# Patient Record
Sex: FEMALE | Race: OTHER | NOT HISPANIC OR LATINO
[De-identification: names, ages, dates, MRNs, and addresses within clinical notes are randomized per-mention and may not be internally consistent; named-entity substitution may affect disease eponyms.]

---

## 2017-04-17 PROBLEM — Z00.00 ENCOUNTER FOR PREVENTIVE HEALTH EXAMINATION: Status: ACTIVE | Noted: 2017-04-17

## 2017-04-20 ENCOUNTER — APPOINTMENT (OUTPATIENT)
Dept: ORTHOPEDIC SURGERY | Facility: CLINIC | Age: 77
End: 2017-04-20

## 2017-04-20 VITALS
HEART RATE: 83 BPM | HEIGHT: 61 IN | BODY MASS INDEX: 24.55 KG/M2 | SYSTOLIC BLOOD PRESSURE: 119 MMHG | DIASTOLIC BLOOD PRESSURE: 74 MMHG | WEIGHT: 130 LBS

## 2017-04-20 DIAGNOSIS — M25.562 PAIN IN LEFT KNEE: ICD-10-CM

## 2017-04-21 PROBLEM — M25.562 LEFT KNEE PAIN: Status: ACTIVE | Noted: 2017-04-21

## 2017-05-02 RX ORDER — CELECOXIB 100 MG/1
100 CAPSULE ORAL
Qty: 30 | Refills: 1 | Status: ACTIVE | COMMUNITY
Start: 2017-05-02 | End: 1900-01-01

## 2017-05-25 ENCOUNTER — APPOINTMENT (OUTPATIENT)
Dept: ORTHOPEDIC SURGERY | Facility: CLINIC | Age: 77
End: 2017-05-25

## 2017-09-12 ENCOUNTER — APPOINTMENT (OUTPATIENT)
Dept: ORTHOPEDIC SURGERY | Facility: CLINIC | Age: 77
End: 2017-09-12
Payer: MEDICAID

## 2017-09-12 PROCEDURE — 99214 OFFICE O/P EST MOD 30 MIN: CPT

## 2017-09-12 RX ORDER — DICLOFENAC SODIUM 10 MG/G
1 GEL TOPICAL
Qty: 2 | Refills: 0 | Status: ACTIVE | COMMUNITY
Start: 2017-09-12 | End: 1900-01-01

## 2017-09-16 ENCOUNTER — EMERGENCY (EMERGENCY)
Facility: HOSPITAL | Age: 77
LOS: 1 days | Discharge: HOME | End: 2017-09-16
Attending: EMERGENCY MEDICINE | Admitting: EMERGENCY MEDICINE
Payer: MEDICAID

## 2017-09-16 VITALS
SYSTOLIC BLOOD PRESSURE: 140 MMHG | OXYGEN SATURATION: 100 % | HEART RATE: 81 BPM | RESPIRATION RATE: 16 BRPM | DIASTOLIC BLOOD PRESSURE: 75 MMHG | TEMPERATURE: 98 F | WEIGHT: 125 LBS

## 2017-09-16 DIAGNOSIS — M25.562 PAIN IN LEFT KNEE: ICD-10-CM

## 2017-09-16 DIAGNOSIS — Z79.84 LONG TERM (CURRENT) USE OF ORAL HYPOGLYCEMIC DRUGS: ICD-10-CM

## 2017-09-16 DIAGNOSIS — Z96.652 PRESENCE OF LEFT ARTIFICIAL KNEE JOINT: ICD-10-CM

## 2017-09-16 DIAGNOSIS — E11.9 TYPE 2 DIABETES MELLITUS WITHOUT COMPLICATIONS: ICD-10-CM

## 2017-09-16 LAB
ALBUMIN SERPL ELPH-MCNC: 3.7 G/DL — SIGNIFICANT CHANGE UP (ref 3.3–5)
ALP SERPL-CCNC: 66 U/L — SIGNIFICANT CHANGE UP (ref 40–120)
ALT FLD-CCNC: 19 U/L — SIGNIFICANT CHANGE UP (ref 12–78)
ANION GAP SERPL CALC-SCNC: 11 MMOL/L — SIGNIFICANT CHANGE UP (ref 5–17)
APTT BLD: 33.6 SEC — SIGNIFICANT CHANGE UP (ref 27.5–37.4)
AST SERPL-CCNC: 26 U/L — SIGNIFICANT CHANGE UP (ref 15–37)
BASOPHILS # BLD AUTO: 0 K/UL — SIGNIFICANT CHANGE UP (ref 0–0.2)
BASOPHILS NFR BLD AUTO: 0.4 % — SIGNIFICANT CHANGE UP (ref 0–2)
BILIRUB SERPL-MCNC: 0.4 MG/DL — SIGNIFICANT CHANGE UP (ref 0.2–1.2)
BUN SERPL-MCNC: 21 MG/DL — SIGNIFICANT CHANGE UP (ref 7–23)
CALCIUM SERPL-MCNC: 8.7 MG/DL — SIGNIFICANT CHANGE UP (ref 8.5–10.1)
CHLORIDE SERPL-SCNC: 105 MMOL/L — SIGNIFICANT CHANGE UP (ref 96–108)
CO2 SERPL-SCNC: 23 MMOL/L — SIGNIFICANT CHANGE UP (ref 22–31)
CREAT SERPL-MCNC: 0.83 MG/DL — SIGNIFICANT CHANGE UP (ref 0.5–1.3)
EOSINOPHIL # BLD AUTO: 0 K/UL — SIGNIFICANT CHANGE UP (ref 0–0.5)
EOSINOPHIL NFR BLD AUTO: 0.3 % — SIGNIFICANT CHANGE UP (ref 0–6)
ERYTHROCYTE [SEDIMENTATION RATE] IN BLOOD: 21 MM/HR — HIGH (ref 0–20)
GLUCOSE SERPL-MCNC: 151 MG/DL — HIGH (ref 70–99)
HCT VFR BLD CALC: 29.5 % — LOW (ref 34.5–45)
HGB BLD-MCNC: 10 G/DL — LOW (ref 11.5–15.5)
INR BLD: 0.99 RATIO — SIGNIFICANT CHANGE UP (ref 0.88–1.16)
LACTATE SERPL-SCNC: 1.7 MMOL/L — SIGNIFICANT CHANGE UP (ref 0.7–2)
LYMPHOCYTES # BLD AUTO: 1.9 K/UL — SIGNIFICANT CHANGE UP (ref 1–3.3)
LYMPHOCYTES # BLD AUTO: 27.9 % — SIGNIFICANT CHANGE UP (ref 13–44)
MCHC RBC-ENTMCNC: 29.8 PG — SIGNIFICANT CHANGE UP (ref 27–34)
MCHC RBC-ENTMCNC: 33.8 GM/DL — SIGNIFICANT CHANGE UP (ref 32–36)
MCV RBC AUTO: 88.2 FL — SIGNIFICANT CHANGE UP (ref 80–100)
MONOCYTES # BLD AUTO: 0.5 K/UL — SIGNIFICANT CHANGE UP (ref 0–0.9)
MONOCYTES NFR BLD AUTO: 7.6 % — SIGNIFICANT CHANGE UP (ref 1–9)
NEUTROPHILS # BLD AUTO: 4.4 K/UL — SIGNIFICANT CHANGE UP (ref 1.8–7.4)
NEUTROPHILS NFR BLD AUTO: 63.8 % — SIGNIFICANT CHANGE UP (ref 43–77)
PLATELET # BLD AUTO: 210 K/UL — SIGNIFICANT CHANGE UP (ref 150–400)
POTASSIUM SERPL-MCNC: 5.1 MMOL/L — SIGNIFICANT CHANGE UP (ref 3.5–5.3)
POTASSIUM SERPL-SCNC: 5.1 MMOL/L — SIGNIFICANT CHANGE UP (ref 3.5–5.3)
PROT SERPL-MCNC: 7.2 G/DL — SIGNIFICANT CHANGE UP (ref 6–8.3)
PROTHROM AB SERPL-ACNC: 10.8 SEC — SIGNIFICANT CHANGE UP (ref 9.8–12.7)
RBC # BLD: 3.34 M/UL — LOW (ref 3.8–5.2)
RBC # FLD: 12.1 % — SIGNIFICANT CHANGE UP (ref 10.3–14.5)
SODIUM SERPL-SCNC: 139 MMOL/L — SIGNIFICANT CHANGE UP (ref 135–145)
WBC # BLD: 6.9 K/UL — SIGNIFICANT CHANGE UP (ref 3.8–10.5)
WBC # FLD AUTO: 6.9 K/UL — SIGNIFICANT CHANGE UP (ref 3.8–10.5)

## 2017-09-16 PROCEDURE — 73562 X-RAY EXAM OF KNEE 3: CPT

## 2017-09-16 PROCEDURE — 99284 EMERGENCY DEPT VISIT MOD MDM: CPT | Mod: 25

## 2017-09-16 PROCEDURE — 85730 THROMBOPLASTIN TIME PARTIAL: CPT

## 2017-09-16 PROCEDURE — 86140 C-REACTIVE PROTEIN: CPT

## 2017-09-16 PROCEDURE — 87040 BLOOD CULTURE FOR BACTERIA: CPT

## 2017-09-16 PROCEDURE — 80053 COMPREHEN METABOLIC PANEL: CPT

## 2017-09-16 PROCEDURE — 73562 X-RAY EXAM OF KNEE 3: CPT | Mod: 26,LT

## 2017-09-16 PROCEDURE — 83605 ASSAY OF LACTIC ACID: CPT

## 2017-09-16 PROCEDURE — 99284 EMERGENCY DEPT VISIT MOD MDM: CPT

## 2017-09-16 PROCEDURE — 85652 RBC SED RATE AUTOMATED: CPT

## 2017-09-16 PROCEDURE — 85027 COMPLETE CBC AUTOMATED: CPT

## 2017-09-16 PROCEDURE — 85610 PROTHROMBIN TIME: CPT

## 2017-09-16 RX ORDER — OXYCODONE AND ACETAMINOPHEN 5; 325 MG/1; MG/1
1 TABLET ORAL ONCE
Qty: 0 | Refills: 0 | Status: DISCONTINUED | OUTPATIENT
Start: 2017-09-16 | End: 2017-09-16

## 2017-09-16 RX ORDER — SODIUM CHLORIDE 9 MG/ML
1000 INJECTION INTRAMUSCULAR; INTRAVENOUS; SUBCUTANEOUS
Qty: 0 | Refills: 0 | Status: DISCONTINUED | OUTPATIENT
Start: 2017-09-16 | End: 2017-09-20

## 2017-09-16 RX ADMIN — OXYCODONE AND ACETAMINOPHEN 1 TABLET(S): 5; 325 TABLET ORAL at 16:13

## 2017-09-16 RX ADMIN — SODIUM CHLORIDE 200 MILLILITER(S): 9 INJECTION INTRAMUSCULAR; INTRAVENOUS; SUBCUTANEOUS at 20:52

## 2017-09-16 RX ADMIN — OXYCODONE AND ACETAMINOPHEN 1 TABLET(S): 5; 325 TABLET ORAL at 18:28

## 2017-09-16 NOTE — CONSULT NOTE ADULT - SUBJECTIVE AND OBJECTIVE BOX
77y Female community ambulatory presents c/o L knee pain for the last 4 days. History obtained through Pt's daughter as Pt does not speak English. Denies any recent trauma. Denies numbness/tingling. Admits to subjective fever/chills at home. Denies pain/injury elsewhere. Pt had a L TKA done at [facility] approx. 10 years ago, cannot remember the surgeon's name.     PAST MEDICAL & SURGICAL HISTORY:  DM (diabetes mellitus)      MEDICATIONS  (STANDING):    Allergies    No Known Allergies    Intolerances                Vital Signs Last 24 Hrs  T(C): 36.5 (09-16-17 @ 15:36), Max: 36.5 (09-16-17 @ 15:36)  T(F): 97.7 (09-16-17 @ 15:36), Max: 97.7 (09-16-17 @ 15:36)  HR: 81 (09-16-17 @ 15:36) (81 - 81)  BP: 140/75 (09-16-17 @ 15:36) (140/75 - 140/75)  BP(mean): --  RR: 16 (09-16-17 @ 15:36) (16 - 16)  SpO2: 100% (09-16-17 @ 15:36) (100% - 100%)    Imaging: XR demonstates R/L Knee OA    Physical Exam  Gen: NAD  LeftLE: Skin intact, Healed, well appearing midline incision over L knee, No erythema/warmth. AROM limited to: 0-75. Positive Effusion. Unable to SLR 2/2 pain, Neg log roll, Negative Heel strike, no ttp elsewhere, +EHL/FHL/TA/GS function, no calf TTP, DP/PT pulses intact, compartments soft. Calf soft, nontender. Ligamentous exam benign: Varus/Valgus/Lochman Negative.\    Secondary Survey: No TTP over bony prominences, SILT, palpable pulses, full/painless range of motion, compartments soft 77y Female community ambulatory presents c/o L knee pain for the last 4 days. History obtained through Pt's daughter as Pt does not speak English. Denies any recent trauma. Denies numbness/tingling. Admits to subjective fever/chills at home. Denies pain/injury elsewhere. Pt had a L TKA done at [facility] approx. 10 years ago, cannot remember the surgeon's name.     PAST MEDICAL & SURGICAL HISTORY:  DM (diabetes mellitus)      MEDICATIONS  (STANDING):    Allergies    No Known Allergies    Intolerances                Vital Signs Last 24 Hrs  T(C): 36.5 (09-16-17 @ 15:36), Max: 36.5 (09-16-17 @ 15:36)  T(F): 97.7 (09-16-17 @ 15:36), Max: 97.7 (09-16-17 @ 15:36)  HR: 81 (09-16-17 @ 15:36) (81 - 81)  BP: 140/75 (09-16-17 @ 15:36) (140/75 - 140/75)  BP(mean): --  RR: 16 (09-16-17 @ 15:36) (16 - 16)  SpO2: 100% (09-16-17 @ 15:36) (100% - 100%)      ESR: 21  CRP: Pending    Imaging: XR L knee demonstrates large knee effusion, no fracture, prior TKA prosthesis in situ    Physical Exam  Gen: NAD  Left LE: Skin intact, Healed, well appearing midline incision over L knee, No erythema/warmth. AROM limited to: 0-75 with pain at end flexion, Positive Effusion. Unable to SLR 2/2 pain, Neg log roll, Negative Heel strike, no ttp elsewhere, +EHL/FHL/TA/GS function, no calf TTP, DP/PT pulses intact, compartments soft. Calf soft, nontender. Ligamentous exam benign: Varus/Valgus/Lochman Negative.    Secondary Survey: No TTP over bony prominences, SILT, palpable pulses, full/painless range of motion, compartments soft 77y Female community ambulatory presents c/o L knee pain for the last 4 days. History obtained through Pt's daughter as Pt does not speak English. Denies any recent trauma. Denies numbness/tingling. Admits to subjective fever/chills at home. Denies pain/injury elsewhere. Pt had a L TKA done at [facility] approx. 10 years ago, cannot remember the surgeon's name.     PAST MEDICAL & SURGICAL HISTORY:  DM (diabetes mellitus)      MEDICATIONS  (STANDING):    Allergies    No Known Allergies    Intolerances                Vital Signs Last 24 Hrs  T(C): 36.5 (09-16-17 @ 15:36), Max: 36.5 (09-16-17 @ 15:36)  T(F): 97.7 (09-16-17 @ 15:36), Max: 97.7 (09-16-17 @ 15:36)  HR: 81 (09-16-17 @ 15:36) (81 - 81)  BP: 140/75 (09-16-17 @ 15:36) (140/75 - 140/75)  BP(mean): --  RR: 16 (09-16-17 @ 15:36) (16 - 16)  SpO2: 100% (09-16-17 @ 15:36) (100% - 100%)      ESR: 21  CRP: 0.2    Imaging: XR L knee demonstrates large knee effusion, no fracture, prior TKA prosthesis in situ    Physical Exam  Gen: NAD  Left LE: Skin intact, Healed, well appearing midline incision over L knee, No erythema/warmth. AROM limited to: 0-75 with pain at end flexion, Positive Effusion. Unable to SLR 2/2 pain, Neg log roll, Negative Heel strike, no ttp elsewhere, +EHL/FHL/TA/GS function, no calf TTP, DP/PT pulses intact, compartments soft. Calf soft, nontender. Ligamentous exam benign: Varus/Valgus/Lochman Negative.    Secondary Survey: No TTP over bony prominences, SILT, palpable pulses, full/painless range of motion, compartments soft

## 2017-09-16 NOTE — CONSULT NOTE ADULT - ASSESSMENT
A/P: 77y Female with Left Knee effusion  Analgesia  Antiinflammatories as tolerated  WBAT with assistive devices as needed  Based on clinical exam/history, unlikely to be septic joint, pain likely 2/2 joint effusion  Bedrest, ice  FU ESR/CRP  Will discuss with attending and advise on plan A/P: 77y Female with Left Knee effusion  Analgesia  Antiinflammatories as tolerated  WBAT with assistive devices as needed  Based on clinical exam/history, unlikely to be infection, pain likely 2/2 joint effusion  Bedrest, ice  ESR/CRP within normal limits  No acute orthopedic surgery intervention necessary at this time  Follow up with Dr. Hernandez as outpatient upon discharge from hospital, call office for appointment  Ortho stable for d/c

## 2017-09-16 NOTE — ED PROVIDER NOTE - OBJECTIVE STATEMENT
78 yo F p/w L knee pain with mild assoc swelling x past ~ 2 - 3 days. No fever/chills> no weakness / malaise. No numb/ting/focal weak.  no known fall / trauma. Pt sp TKR 10 yrs ago. No redness / discharge. No agg/allev factors. No other inj or co.

## 2017-09-16 NOTE — ED PROVIDER NOTE - PROGRESS NOTE DETAILS
Pt seen by Ortho, states in conj with Ortho attn, will await CRP (aware send out test) and will decide on admission/ tap vs dc. Pt doing well, no acute co or changes. cleared by Ortho resident and Dr Monteiro  D/C to the office

## 2017-09-17 VITALS
TEMPERATURE: 98 F | OXYGEN SATURATION: 99 % | SYSTOLIC BLOOD PRESSURE: 124 MMHG | RESPIRATION RATE: 16 BRPM | HEART RATE: 74 BPM | DIASTOLIC BLOOD PRESSURE: 70 MMHG

## 2017-09-17 LAB — CRP SERPL-MCNC: 0.2 MG/DL — SIGNIFICANT CHANGE UP (ref 0–0.4)

## 2017-09-17 NOTE — ED ADULT NURSE REASSESSMENT NOTE - NS ED NURSE REASSESS COMMENT FT1
pt reavaluated andvital signs stable  pt with blood result negative d/c home with daughter to follow up

## 2017-09-22 LAB
CULTURE RESULTS: SIGNIFICANT CHANGE UP
CULTURE RESULTS: SIGNIFICANT CHANGE UP
SPECIMEN SOURCE: SIGNIFICANT CHANGE UP
SPECIMEN SOURCE: SIGNIFICANT CHANGE UP

## 2017-09-28 ENCOUNTER — APPOINTMENT (OUTPATIENT)
Dept: ORTHOPEDIC SURGERY | Facility: CLINIC | Age: 77
End: 2017-09-28

## 2017-10-10 RX ORDER — CELECOXIB 100 MG/1
100 CAPSULE ORAL
Qty: 40 | Refills: 2 | Status: ACTIVE | COMMUNITY
Start: 2017-10-10 | End: 1900-01-01

## 2017-12-28 ENCOUNTER — OUTPATIENT (OUTPATIENT)
Dept: OUTPATIENT SERVICES | Facility: HOSPITAL | Age: 77
LOS: 1 days | End: 2017-12-28
Payer: MEDICAID

## 2017-12-28 VITALS
DIASTOLIC BLOOD PRESSURE: 62 MMHG | TEMPERATURE: 98 F | WEIGHT: 130.07 LBS | HEART RATE: 84 BPM | SYSTOLIC BLOOD PRESSURE: 100 MMHG | RESPIRATION RATE: 16 BRPM

## 2017-12-28 DIAGNOSIS — Z90.3 ACQUIRED ABSENCE OF STOMACH [PART OF]: Chronic | ICD-10-CM

## 2017-12-28 DIAGNOSIS — K86.1 OTHER CHRONIC PANCREATITIS: ICD-10-CM

## 2017-12-28 DIAGNOSIS — E11.9 TYPE 2 DIABETES MELLITUS WITHOUT COMPLICATIONS: ICD-10-CM

## 2017-12-28 DIAGNOSIS — Z98.890 OTHER SPECIFIED POSTPROCEDURAL STATES: Chronic | ICD-10-CM

## 2017-12-28 DIAGNOSIS — Z01.818 ENCOUNTER FOR OTHER PREPROCEDURAL EXAMINATION: ICD-10-CM

## 2017-12-28 DIAGNOSIS — Z90.49 ACQUIRED ABSENCE OF OTHER SPECIFIED PARTS OF DIGESTIVE TRACT: Chronic | ICD-10-CM

## 2017-12-28 LAB
ALBUMIN SERPL ELPH-MCNC: 3.7 G/DL — SIGNIFICANT CHANGE UP (ref 3.3–5)
ALP SERPL-CCNC: 67 U/L — SIGNIFICANT CHANGE UP (ref 40–120)
ALT FLD-CCNC: 25 U/L — SIGNIFICANT CHANGE UP (ref 12–78)
ANION GAP SERPL CALC-SCNC: 8 MMOL/L — SIGNIFICANT CHANGE UP (ref 5–17)
AST SERPL-CCNC: 32 U/L — SIGNIFICANT CHANGE UP (ref 15–37)
BILIRUB SERPL-MCNC: 0.3 MG/DL — SIGNIFICANT CHANGE UP (ref 0.2–1.2)
BUN SERPL-MCNC: 20 MG/DL — SIGNIFICANT CHANGE UP (ref 7–23)
CALCIUM SERPL-MCNC: 8.8 MG/DL — SIGNIFICANT CHANGE UP (ref 8.5–10.1)
CHLORIDE SERPL-SCNC: 105 MMOL/L — SIGNIFICANT CHANGE UP (ref 96–108)
CO2 SERPL-SCNC: 26 MMOL/L — SIGNIFICANT CHANGE UP (ref 22–31)
CREAT SERPL-MCNC: 0.82 MG/DL — SIGNIFICANT CHANGE UP (ref 0.5–1.3)
GLUCOSE SERPL-MCNC: 107 MG/DL — HIGH (ref 70–99)
HBA1C BLD-MCNC: 6.9 % — HIGH (ref 4–5.6)
HCT VFR BLD CALC: 33.6 % — LOW (ref 34.5–45)
HGB BLD-MCNC: 11.1 G/DL — LOW (ref 11.5–15.5)
MCHC RBC-ENTMCNC: 29.1 PG — SIGNIFICANT CHANGE UP (ref 27–34)
MCHC RBC-ENTMCNC: 33 GM/DL — SIGNIFICANT CHANGE UP (ref 32–36)
MCV RBC AUTO: 88 FL — SIGNIFICANT CHANGE UP (ref 80–100)
PLATELET # BLD AUTO: 210 K/UL — SIGNIFICANT CHANGE UP (ref 150–400)
POTASSIUM SERPL-MCNC: 5 MMOL/L — SIGNIFICANT CHANGE UP (ref 3.5–5.3)
POTASSIUM SERPL-SCNC: 5 MMOL/L — SIGNIFICANT CHANGE UP (ref 3.5–5.3)
PROT SERPL-MCNC: 7.2 G/DL — SIGNIFICANT CHANGE UP (ref 6–8.3)
RBC # BLD: 3.82 M/UL — SIGNIFICANT CHANGE UP (ref 3.8–5.2)
RBC # FLD: 14.1 % — SIGNIFICANT CHANGE UP (ref 10.3–14.5)
SODIUM SERPL-SCNC: 139 MMOL/L — SIGNIFICANT CHANGE UP (ref 135–145)
WBC # BLD: 5.8 K/UL — SIGNIFICANT CHANGE UP (ref 3.8–10.5)
WBC # FLD AUTO: 5.8 K/UL — SIGNIFICANT CHANGE UP (ref 3.8–10.5)

## 2017-12-28 PROCEDURE — G0463: CPT

## 2017-12-28 PROCEDURE — 93005 ELECTROCARDIOGRAM TRACING: CPT

## 2017-12-28 PROCEDURE — 80053 COMPREHEN METABOLIC PANEL: CPT

## 2017-12-28 PROCEDURE — 83036 HEMOGLOBIN GLYCOSYLATED A1C: CPT

## 2017-12-28 PROCEDURE — 85027 COMPLETE CBC AUTOMATED: CPT

## 2017-12-28 PROCEDURE — 93010 ELECTROCARDIOGRAM REPORT: CPT | Mod: NC

## 2017-12-28 RX ORDER — DEXTROSE 50 % IN WATER 50 %
25 SYRINGE (ML) INTRAVENOUS ONCE
Qty: 0 | Refills: 0 | Status: DISCONTINUED | OUTPATIENT
Start: 2018-01-09 | End: 2018-01-24

## 2017-12-28 RX ORDER — DEXTROSE 50 % IN WATER 50 %
1 SYRINGE (ML) INTRAVENOUS ONCE
Qty: 0 | Refills: 0 | Status: DISCONTINUED | OUTPATIENT
Start: 2018-01-09 | End: 2018-01-24

## 2017-12-28 RX ORDER — METFORMIN HYDROCHLORIDE 850 MG/1
0 TABLET ORAL
Qty: 0 | Refills: 0 | COMMUNITY

## 2017-12-28 RX ORDER — SODIUM CHLORIDE 9 MG/ML
1000 INJECTION, SOLUTION INTRAVENOUS
Qty: 0 | Refills: 0 | Status: DISCONTINUED | OUTPATIENT
Start: 2018-01-09 | End: 2018-01-24

## 2017-12-28 RX ORDER — DEXTROSE 50 % IN WATER 50 %
12.5 SYRINGE (ML) INTRAVENOUS ONCE
Qty: 0 | Refills: 0 | Status: DISCONTINUED | OUTPATIENT
Start: 2018-01-09 | End: 2018-01-24

## 2017-12-28 RX ORDER — GLUCAGON INJECTION, SOLUTION 0.5 MG/.1ML
1 INJECTION, SOLUTION SUBCUTANEOUS ONCE
Qty: 0 | Refills: 0 | Status: DISCONTINUED | OUTPATIENT
Start: 2018-01-09 | End: 2018-01-24

## 2017-12-28 NOTE — H&P PST ADULT - PSH
History of back surgery  (2007)  S/P arthroscopic knee surgery  (Right knee, 2007)  S/P cholecystectomy  (2002)  S/P subtotal gastrectomy  (with gastrojeujunal anastomosis, 30 years ago) History of back surgery  (2007)  S/P arthroscopic knee surgery  (Right knee, 2007)  S/P cholecystectomy  (2002)  S/P subtotal gastrectomy  (with gastrojejunal anastomosis, 30 years ago)

## 2017-12-28 NOTE — H&P PST ADULT - PROBLEM SELECTOR PLAN 3
Instructed pt of need for endocrine clearance if HgbA1c is 9 or greater. Last dose of Metformin is morning dose day before surgery. Pt verbalized understanding.

## 2017-12-28 NOTE — H&P PST ADULT - PROBLEM SELECTOR PLAN 2
Medical clearance needed and received in chart. CBC, Comprehensive panel and EKG ordered. Pre-op instructions given and pt and pt's son verbalized understanding.

## 2017-12-28 NOTE — H&P PST ADULT - PMH
Type 2 diabetes mellitus Anemia    Anxiety    OA (osteoarthritis) of knee  (Right knee)  Other chronic pancreatitis    Type 2 diabetes mellitus

## 2017-12-28 NOTE — H&P PST ADULT - RS GEN PE MLT RESP DETAILS PC
good air movement/breath sounds equal/airway patent/respirations non-labored/normal/clear to auscultation bilaterally

## 2017-12-28 NOTE — H&P PST ADULT - NEGATIVE CARDIOVASCULAR SYMPTOMS
no chest pain/no claudication/no orthopnea/no peripheral edema/no palpitations/no dyspnea on exertion/no paroxysmal nocturnal dyspnea

## 2017-12-28 NOTE — H&P PST ADULT - NSANTHOSAYNRD_GEN_A_CORE
No. ANN screening performed.  STOP BANG Legend: 0-2 = LOW Risk; 3-4 = INTERMEDIATE Risk; 5-8 = HIGH Risk

## 2017-12-28 NOTE — H&P PST ADULT - SOURCE OF INFORMATION, PROFILE
patient family/chart(s)/patient/Pt speaks [place] and refusing to use [employer] and requesting son Fernie Arthur to translate for her.

## 2018-01-08 RX ORDER — SODIUM CHLORIDE 9 MG/ML
1000 INJECTION, SOLUTION INTRAVENOUS
Qty: 0 | Refills: 0 | Status: DISCONTINUED | OUTPATIENT
Start: 2018-01-09 | End: 2018-01-09

## 2018-01-09 ENCOUNTER — TRANSCRIPTION ENCOUNTER (OUTPATIENT)
Age: 78
End: 2018-01-09

## 2018-01-09 ENCOUNTER — OUTPATIENT (OUTPATIENT)
Dept: OUTPATIENT SERVICES | Facility: HOSPITAL | Age: 78
LOS: 1 days | End: 2018-01-09
Payer: MEDICAID

## 2018-01-09 VITALS
HEART RATE: 86 BPM | TEMPERATURE: 98 F | OXYGEN SATURATION: 98 % | RESPIRATION RATE: 14 BRPM | SYSTOLIC BLOOD PRESSURE: 156 MMHG | DIASTOLIC BLOOD PRESSURE: 73 MMHG

## 2018-01-09 VITALS
SYSTOLIC BLOOD PRESSURE: 152 MMHG | HEIGHT: 61 IN | DIASTOLIC BLOOD PRESSURE: 67 MMHG | OXYGEN SATURATION: 99 % | TEMPERATURE: 98 F | RESPIRATION RATE: 18 BRPM | HEART RATE: 76 BPM | WEIGHT: 130.07 LBS

## 2018-01-09 DIAGNOSIS — Z98.890 OTHER SPECIFIED POSTPROCEDURAL STATES: Chronic | ICD-10-CM

## 2018-01-09 DIAGNOSIS — Z01.818 ENCOUNTER FOR OTHER PREPROCEDURAL EXAMINATION: ICD-10-CM

## 2018-01-09 DIAGNOSIS — K86.1 OTHER CHRONIC PANCREATITIS: ICD-10-CM

## 2018-01-09 DIAGNOSIS — Z90.3 ACQUIRED ABSENCE OF STOMACH [PART OF]: Chronic | ICD-10-CM

## 2018-01-09 DIAGNOSIS — Z90.49 ACQUIRED ABSENCE OF OTHER SPECIFIED PARTS OF DIGESTIVE TRACT: Chronic | ICD-10-CM

## 2018-01-09 PROCEDURE — C1769: CPT

## 2018-01-09 PROCEDURE — 43260 ERCP W/SPECIMEN COLLECTION: CPT | Mod: 74

## 2018-01-09 PROCEDURE — 76000 FLUOROSCOPY <1 HR PHYS/QHP: CPT

## 2018-01-09 PROCEDURE — 82962 GLUCOSE BLOOD TEST: CPT

## 2018-01-09 RX ORDER — PANTOPRAZOLE SODIUM 20 MG/1
40 TABLET, DELAYED RELEASE ORAL ONCE
Qty: 0 | Refills: 0 | Status: COMPLETED | OUTPATIENT
Start: 2018-01-09 | End: 2018-01-09

## 2018-01-09 RX ORDER — HYDROMORPHONE HYDROCHLORIDE 2 MG/ML
0.4 INJECTION INTRAMUSCULAR; INTRAVENOUS; SUBCUTANEOUS
Qty: 0 | Refills: 0 | Status: DISCONTINUED | OUTPATIENT
Start: 2018-01-09 | End: 2018-01-09

## 2018-01-09 RX ORDER — ACETAMINOPHEN 500 MG
1000 TABLET ORAL ONCE
Qty: 0 | Refills: 0 | Status: COMPLETED | OUTPATIENT
Start: 2018-01-09 | End: 2018-01-09

## 2018-01-09 RX ORDER — SODIUM CHLORIDE 9 MG/ML
1000 INJECTION, SOLUTION INTRAVENOUS
Qty: 0 | Refills: 0 | Status: DISCONTINUED | OUTPATIENT
Start: 2018-01-09 | End: 2018-01-09

## 2018-01-09 RX ORDER — ONDANSETRON 8 MG/1
4 TABLET, FILM COATED ORAL ONCE
Qty: 0 | Refills: 0 | Status: DISCONTINUED | OUTPATIENT
Start: 2018-01-09 | End: 2018-01-09

## 2018-01-09 RX ADMIN — SODIUM CHLORIDE 75 MILLILITER(S): 9 INJECTION, SOLUTION INTRAVENOUS at 07:13

## 2018-01-09 RX ADMIN — Medication 1000 MILLIGRAM(S): at 10:15

## 2018-01-09 RX ADMIN — PANTOPRAZOLE SODIUM 40 MILLIGRAM(S): 20 TABLET, DELAYED RELEASE ORAL at 10:45

## 2018-01-09 RX ADMIN — Medication 400 MILLIGRAM(S): at 10:35

## 2018-01-09 NOTE — ASU PATIENT PROFILE, ADULT - PMH
Anemia    Anxiety    OA (osteoarthritis) of knee  (Right knee)  Other chronic pancreatitis    Type 2 diabetes mellitus

## 2018-01-09 NOTE — ASU PATIENT PROFILE, ADULT - PSH
History of back surgery  (2007)  S/P arthroscopic knee surgery  (Right knee, 2007)  S/P cholecystectomy  (2002)  S/P subtotal gastrectomy  (with gastrojejunal anastomosis, 30 years ago)

## 2018-01-25 ENCOUNTER — APPOINTMENT (OUTPATIENT)
Dept: GASTROENTEROLOGY | Facility: CLINIC | Age: 78
End: 2018-01-25
Payer: MEDICAID

## 2018-01-25 VITALS
SYSTOLIC BLOOD PRESSURE: 120 MMHG | HEART RATE: 87 BPM | DIASTOLIC BLOOD PRESSURE: 64 MMHG | TEMPERATURE: 97.7 F | WEIGHT: 130 LBS | OXYGEN SATURATION: 98 % | BODY MASS INDEX: 24.55 KG/M2 | RESPIRATION RATE: 15 BRPM | HEIGHT: 61 IN

## 2018-01-25 DIAGNOSIS — M25.562 PAIN IN RIGHT KNEE: ICD-10-CM

## 2018-01-25 DIAGNOSIS — M25.561 PAIN IN RIGHT KNEE: ICD-10-CM

## 2018-01-25 DIAGNOSIS — Z78.9 OTHER SPECIFIED HEALTH STATUS: ICD-10-CM

## 2018-01-25 DIAGNOSIS — Z87.891 PERSONAL HISTORY OF NICOTINE DEPENDENCE: ICD-10-CM

## 2018-01-25 PROCEDURE — 99203 OFFICE O/P NEW LOW 30 MIN: CPT

## 2018-02-02 ENCOUNTER — RESULT REVIEW (OUTPATIENT)
Age: 78
End: 2018-02-02

## 2018-02-12 ENCOUNTER — RESULT REVIEW (OUTPATIENT)
Age: 78
End: 2018-02-12

## 2018-02-12 ENCOUNTER — OUTPATIENT (OUTPATIENT)
Dept: OUTPATIENT SERVICES | Facility: HOSPITAL | Age: 78
LOS: 1 days | Discharge: HOME | End: 2018-02-12
Payer: MEDICAID

## 2018-02-12 ENCOUNTER — APPOINTMENT (OUTPATIENT)
Dept: GASTROENTEROLOGY | Facility: HOSPITAL | Age: 78
End: 2018-02-12

## 2018-02-12 DIAGNOSIS — Z98.890 OTHER SPECIFIED POSTPROCEDURAL STATES: Chronic | ICD-10-CM

## 2018-02-12 DIAGNOSIS — K83.1 OBSTRUCTION OF BILE DUCT: ICD-10-CM

## 2018-02-12 DIAGNOSIS — Z90.49 ACQUIRED ABSENCE OF OTHER SPECIFIED PARTS OF DIGESTIVE TRACT: Chronic | ICD-10-CM

## 2018-02-12 DIAGNOSIS — Z90.3 ACQUIRED ABSENCE OF STOMACH [PART OF]: Chronic | ICD-10-CM

## 2018-02-12 PROBLEM — Z87.891 FORMER SMOKER: Status: ACTIVE | Noted: 2017-04-20

## 2018-02-12 PROBLEM — M25.561 BILATERAL KNEE PAIN: Status: RESOLVED | Noted: 2017-04-20 | Resolved: 2018-02-12

## 2018-02-12 PROCEDURE — 43261 ENDO CHOLANGIOPANCREATOGRAPH: CPT | Mod: 59,GC

## 2018-02-12 PROCEDURE — 88305 TISSUE EXAM BY PATHOLOGIST: CPT | Mod: 26

## 2018-02-12 PROCEDURE — 43274 ERCP DUCT STENT PLACEMENT: CPT | Mod: GC

## 2018-02-12 PROCEDURE — 74328 X-RAY BILE DUCT ENDOSCOPY: CPT | Mod: 26,GC

## 2018-03-07 LAB
ALBUMIN SERPL ELPH-MCNC: 4.1 G/DL
ALP BLD-CCNC: 69 U/L
ALT SERPL-CCNC: 13 U/L
ANION GAP SERPL CALC-SCNC: 14 MMOL/L
AST SERPL-CCNC: 28 U/L
BILIRUB SERPL-MCNC: 0.2 MG/DL
BUN SERPL-MCNC: 24 MG/DL
CALCIUM SERPL-MCNC: 10.1 MG/DL
CANCER AG19-9 SERPL-ACNC: 71.7 U/ML
CHLORIDE SERPL-SCNC: 103 MMOL/L
CO2 SERPL-SCNC: 26 MMOL/L
CREAT SERPL-MCNC: 0.93 MG/DL
GLUCOSE SERPL-MCNC: 101 MG/DL
POTASSIUM SERPL-SCNC: 5.4 MMOL/L
PROT SERPL-MCNC: 7.5 G/DL
SODIUM SERPL-SCNC: 143 MMOL/L

## 2018-03-08 ENCOUNTER — APPOINTMENT (OUTPATIENT)
Dept: GASTROENTEROLOGY | Facility: CLINIC | Age: 78
End: 2018-03-08
Payer: MEDICAID

## 2018-03-08 VITALS
HEART RATE: 74 BPM | HEIGHT: 61 IN | WEIGHT: 135 LBS | DIASTOLIC BLOOD PRESSURE: 80 MMHG | OXYGEN SATURATION: 98 % | BODY MASS INDEX: 25.49 KG/M2 | SYSTOLIC BLOOD PRESSURE: 140 MMHG

## 2018-03-08 PROCEDURE — 99214 OFFICE O/P EST MOD 30 MIN: CPT

## 2018-03-16 RX ORDER — ACETAMINOPHEN 325 MG/1
TABLET, FILM COATED ORAL
Refills: 0 | Status: ACTIVE | COMMUNITY

## 2018-04-09 ENCOUNTER — RESULT REVIEW (OUTPATIENT)
Age: 78
End: 2018-04-09

## 2018-04-09 ENCOUNTER — OUTPATIENT (OUTPATIENT)
Dept: OUTPATIENT SERVICES | Facility: HOSPITAL | Age: 78
LOS: 1 days | Discharge: HOME | End: 2018-04-09
Payer: MEDICAID

## 2018-04-09 ENCOUNTER — APPOINTMENT (OUTPATIENT)
Dept: GASTROENTEROLOGY | Facility: HOSPITAL | Age: 78
End: 2018-04-09

## 2018-04-09 DIAGNOSIS — Z90.49 ACQUIRED ABSENCE OF OTHER SPECIFIED PARTS OF DIGESTIVE TRACT: Chronic | ICD-10-CM

## 2018-04-09 DIAGNOSIS — Z98.890 OTHER SPECIFIED POSTPROCEDURAL STATES: Chronic | ICD-10-CM

## 2018-04-09 DIAGNOSIS — Z90.3 ACQUIRED ABSENCE OF STOMACH [PART OF]: Chronic | ICD-10-CM

## 2018-04-09 DIAGNOSIS — K83.1 OBSTRUCTION OF BILE DUCT: ICD-10-CM

## 2018-04-09 PROCEDURE — 43264 ERCP REMOVE DUCT CALCULI: CPT | Mod: 59,GC

## 2018-04-09 PROCEDURE — 88104 CYTOPATH FL NONGYN SMEARS: CPT | Mod: 26

## 2018-04-09 PROCEDURE — 43275 ERCP REMOVE FORGN BODY DUCT: CPT | Mod: GC

## 2018-04-09 PROCEDURE — 88305 TISSUE EXAM BY PATHOLOGIST: CPT | Mod: 26

## 2018-04-09 PROCEDURE — 43261 ENDO CHOLANGIOPANCREATOGRAPH: CPT | Mod: 59,GC

## 2018-04-09 PROCEDURE — 74328 X-RAY BILE DUCT ENDOSCOPY: CPT | Mod: 26,GC

## 2018-04-09 PROCEDURE — 71045 X-RAY EXAM CHEST 1 VIEW: CPT | Mod: 26

## 2018-05-10 ENCOUNTER — APPOINTMENT (OUTPATIENT)
Dept: GASTROENTEROLOGY | Facility: CLINIC | Age: 78
End: 2018-05-10

## 2018-05-19 LAB — CANCER AG19-9 SERPL-ACNC: 65.7 U/ML

## 2018-07-16 PROBLEM — E11.9 TYPE 2 DIABETES MELLITUS WITHOUT COMPLICATIONS: Chronic | Status: INACTIVE | Noted: 2017-09-16 | Resolved: 2017-12-28

## 2018-07-26 LAB
ALBUMIN SERPL ELPH-MCNC: 4.2 G/DL
ALP BLD-CCNC: 73 U/L
ALT SERPL-CCNC: 20 U/L
ANION GAP SERPL CALC-SCNC: 16 MMOL/L
AST SERPL-CCNC: 32 U/L
BILIRUB SERPL-MCNC: 0.2 MG/DL
BUN SERPL-MCNC: 24 MG/DL
CALCIUM SERPL-MCNC: 9.7 MG/DL
CHLORIDE SERPL-SCNC: 100 MMOL/L
CO2 SERPL-SCNC: 26 MMOL/L
CREAT SERPL-MCNC: 0.89 MG/DL
GLUCOSE SERPL-MCNC: 134 MG/DL
POTASSIUM SERPL-SCNC: 5.6 MMOL/L
PROT SERPL-MCNC: 6.8 G/DL
SODIUM SERPL-SCNC: 142 MMOL/L

## 2018-07-28 PROBLEM — Z78.9 ALCOHOL USE: Status: INACTIVE | Noted: 2017-04-20

## 2019-09-23 PROBLEM — F41.9 ANXIETY DISORDER, UNSPECIFIED: Chronic | Status: ACTIVE | Noted: 2017-12-28

## 2019-09-23 PROBLEM — K86.1 OTHER CHRONIC PANCREATITIS: Chronic | Status: ACTIVE | Noted: 2017-12-28

## 2019-09-23 PROBLEM — E11.9 TYPE 2 DIABETES MELLITUS WITHOUT COMPLICATIONS: Chronic | Status: ACTIVE | Noted: 2017-12-28

## 2019-09-23 PROBLEM — M17.10 UNILATERAL PRIMARY OSTEOARTHRITIS, UNSPECIFIED KNEE: Chronic | Status: ACTIVE | Noted: 2017-12-28

## 2019-09-23 PROBLEM — D64.9 ANEMIA, UNSPECIFIED: Chronic | Status: ACTIVE | Noted: 2017-12-28

## 2019-09-26 ENCOUNTER — APPOINTMENT (OUTPATIENT)
Dept: ORTHOPEDIC SURGERY | Facility: CLINIC | Age: 79
End: 2019-09-26
Payer: MEDICARE

## 2019-09-26 DIAGNOSIS — M17.12 UNILATERAL PRIMARY OSTEOARTHRITIS, LEFT KNEE: ICD-10-CM

## 2019-09-26 DIAGNOSIS — M17.11 UNILATERAL PRIMARY OSTEOARTHRITIS, RIGHT KNEE: ICD-10-CM

## 2019-09-26 PROCEDURE — 99214 OFFICE O/P EST MOD 30 MIN: CPT | Mod: 25

## 2019-09-26 PROCEDURE — 73562 X-RAY EXAM OF KNEE 3: CPT | Mod: 50

## 2019-09-26 PROCEDURE — 20610 DRAIN/INJ JOINT/BURSA W/O US: CPT | Mod: RT

## 2019-09-26 RX ORDER — DICLOFENAC SODIUM 10 MG/G
1 GEL TOPICAL DAILY
Qty: 1 | Refills: 2 | Status: ACTIVE | COMMUNITY
Start: 2019-09-26 | End: 1900-01-01

## 2019-12-06 NOTE — ED ADULT NURSE NOTE - CHIEF COMPLAINT
#[phone]  Pt has a finger that is red, swollen and puffy-like around the cuticle area. Pt would like to be seen today.   Please call
Spoke with patient. Two pt identifiers confirmed. Patient offered an appointment with Dr. Raya Beasley on 12/09/19. Appointment accepted. Patient advised if anything changes or if unable to keep this appointment to call the office  Pt verbalized understanding of information discussed w/ no further questions at this time.
The patient is a 77y Female complaining of pain, knee.

## 2020-07-30 ENCOUNTER — APPOINTMENT (OUTPATIENT)
Dept: GASTROENTEROLOGY | Facility: CLINIC | Age: 80
End: 2020-07-30
Payer: MEDICARE

## 2020-07-30 VITALS
BODY MASS INDEX: 21.34 KG/M2 | WEIGHT: 113 LBS | DIASTOLIC BLOOD PRESSURE: 80 MMHG | SYSTOLIC BLOOD PRESSURE: 120 MMHG | OXYGEN SATURATION: 98 % | HEIGHT: 61 IN | HEART RATE: 74 BPM

## 2020-07-30 DIAGNOSIS — K83.1 OBSTRUCTION OF BILE DUCT: ICD-10-CM

## 2020-07-30 DIAGNOSIS — R10.812 LEFT UPPER QUADRANT ABDOMINAL TENDERNESS: ICD-10-CM

## 2020-07-30 PROCEDURE — 99213 OFFICE O/P EST LOW 20 MIN: CPT

## 2020-08-11 PROBLEM — K83.1 BILIARY STRICTURE: Status: ACTIVE | Noted: 2018-01-25

## 2020-08-11 NOTE — HISTORY OF PRESENT ILLNESS
[FreeTextEntry1] : 80 known to me for biliary stricture\par HAS two months of severe left sided abdominal pain under ribs worse with breathing\par It is not related to ribs however\par Has 5-10 lbs weight loss\par

## 2020-08-11 NOTE — PHYSICAL EXAM
[General Appearance - In No Acute Distress] : in no acute distress [General Appearance - Well Nourished] : well nourished [General Appearance - Well Developed] : well developed [Examination Of The Oral Cavity] : the lips and gums were normal [Sclera] : the sclera and conjunctiva were normal [Oropharynx] : the oropharynx was normal [Neck Appearance] : the appearance of the neck was normal [Neck Cervical Mass (___cm)] : no neck mass was observed [Jugular Venous Distention Increased] : there was no jugular-venous distention [Heart Rate And Rhythm] : heart rate was normal and rhythm regular [Heart Sounds] : normal S1 and S2 [Edema] : there was no peripheral edema [Bowel Sounds] : normal bowel sounds [Abdomen Soft] : soft [Abdomen Mass (___ Cm)] : no abdominal mass palpated [FreeTextEntry1] : left sided tenderness, no rib tenderness, no splenomegaly [Cervical Lymph Nodes Enlarged Posterior Bilaterally] : posterior cervical [Cervical Lymph Nodes Enlarged Anterior Bilaterally] : anterior cervical [No CVA Tenderness] : no ~M costovertebral angle tenderness [Abnormal Walk] : normal gait [Nail Clubbing] : no clubbing  or cyanosis of the fingernails [Musculoskeletal - Swelling] : no joint swelling seen [Skin Color & Pigmentation] : normal skin color and pigmentation [] : no rash [Skin Lesions] : no skin lesions [Motor Exam] : the motor exam was normal [No Focal Deficits] : no focal deficits [Oriented To Time, Place, And Person] : oriented to person, place, and time [Impaired Insight] : insight and judgment were intact [Mood] : the mood was normal

## 2020-08-11 NOTE — ASSESSMENT
[FreeTextEntry1] : Plan for CT scan to better assess given altered anatomy, weight loss and abdominal pain\par Given history of stomach surgery and prior normal endoscopies it is unlikely that repeating this will be beneficial.

## 2020-08-26 ENCOUNTER — OUTPATIENT (OUTPATIENT)
Dept: OUTPATIENT SERVICES | Facility: HOSPITAL | Age: 80
LOS: 1 days | End: 2020-08-26
Payer: MEDICARE

## 2020-08-26 ENCOUNTER — APPOINTMENT (OUTPATIENT)
Dept: CT IMAGING | Facility: CLINIC | Age: 80
End: 2020-08-26
Payer: MEDICARE

## 2020-08-26 ENCOUNTER — RESULT REVIEW (OUTPATIENT)
Age: 80
End: 2020-08-26

## 2020-08-26 DIAGNOSIS — Z98.890 OTHER SPECIFIED POSTPROCEDURAL STATES: Chronic | ICD-10-CM

## 2020-08-26 DIAGNOSIS — Z90.49 ACQUIRED ABSENCE OF OTHER SPECIFIED PARTS OF DIGESTIVE TRACT: Chronic | ICD-10-CM

## 2020-08-26 DIAGNOSIS — R10.812 LEFT UPPER QUADRANT ABDOMINAL TENDERNESS: ICD-10-CM

## 2020-08-26 DIAGNOSIS — Z90.3 ACQUIRED ABSENCE OF STOMACH [PART OF]: Chronic | ICD-10-CM

## 2020-08-26 PROCEDURE — 82565 ASSAY OF CREATININE: CPT

## 2020-08-26 PROCEDURE — 74177 CT ABD & PELVIS W/CONTRAST: CPT | Mod: 26

## 2020-08-26 PROCEDURE — 74177 CT ABD & PELVIS W/CONTRAST: CPT

## 2020-10-16 ENCOUNTER — INPATIENT (INPATIENT)
Facility: HOSPITAL | Age: 80
LOS: 0 days | Discharge: HOME | DRG: 311 | End: 2020-10-17
Attending: STUDENT IN AN ORGANIZED HEALTH CARE EDUCATION/TRAINING PROGRAM | Admitting: STUDENT IN AN ORGANIZED HEALTH CARE EDUCATION/TRAINING PROGRAM
Payer: COMMERCIAL

## 2020-10-16 VITALS
DIASTOLIC BLOOD PRESSURE: 90 MMHG | SYSTOLIC BLOOD PRESSURE: 180 MMHG | HEART RATE: 85 BPM | OXYGEN SATURATION: 97 % | TEMPERATURE: 98 F | WEIGHT: 166.01 LBS | RESPIRATION RATE: 16 BRPM

## 2020-10-16 DIAGNOSIS — Z98.890 OTHER SPECIFIED POSTPROCEDURAL STATES: Chronic | ICD-10-CM

## 2020-10-16 DIAGNOSIS — Z90.3 ACQUIRED ABSENCE OF STOMACH [PART OF]: Chronic | ICD-10-CM

## 2020-10-16 DIAGNOSIS — K21.9 GASTRO-ESOPHAGEAL REFLUX DISEASE WITHOUT ESOPHAGITIS: ICD-10-CM

## 2020-10-16 DIAGNOSIS — R53.1 WEAKNESS: ICD-10-CM

## 2020-10-16 DIAGNOSIS — M25.50 PAIN IN UNSPECIFIED JOINT: ICD-10-CM

## 2020-10-16 DIAGNOSIS — Z29.9 ENCOUNTER FOR PROPHYLACTIC MEASURES, UNSPECIFIED: ICD-10-CM

## 2020-10-16 DIAGNOSIS — R77.8 OTHER SPECIFIED ABNORMALITIES OF PLASMA PROTEINS: ICD-10-CM

## 2020-10-16 DIAGNOSIS — E11.9 TYPE 2 DIABETES MELLITUS WITHOUT COMPLICATIONS: ICD-10-CM

## 2020-10-16 DIAGNOSIS — Z90.49 ACQUIRED ABSENCE OF OTHER SPECIFIED PARTS OF DIGESTIVE TRACT: Chronic | ICD-10-CM

## 2020-10-16 DIAGNOSIS — F41.9 ANXIETY DISORDER, UNSPECIFIED: ICD-10-CM

## 2020-10-16 DIAGNOSIS — Z96.659 PRESENCE OF UNSPECIFIED ARTIFICIAL KNEE JOINT: Chronic | ICD-10-CM

## 2020-10-16 LAB
ALBUMIN SERPL ELPH-MCNC: 3.7 G/DL — SIGNIFICANT CHANGE UP (ref 3.3–5)
ALP SERPL-CCNC: 71 U/L — SIGNIFICANT CHANGE UP (ref 40–120)
ALT FLD-CCNC: 17 U/L — SIGNIFICANT CHANGE UP (ref 12–78)
ANION GAP SERPL CALC-SCNC: 11 MMOL/L — SIGNIFICANT CHANGE UP (ref 5–17)
APPEARANCE UR: CLEAR — SIGNIFICANT CHANGE UP
AST SERPL-CCNC: 34 U/L — SIGNIFICANT CHANGE UP (ref 15–37)
BACTERIA # UR AUTO: ABNORMAL
BASOPHILS # BLD AUTO: 0.01 K/UL — SIGNIFICANT CHANGE UP (ref 0–0.2)
BASOPHILS NFR BLD AUTO: 0.1 % — SIGNIFICANT CHANGE UP (ref 0–2)
BILIRUB SERPL-MCNC: 0.5 MG/DL — SIGNIFICANT CHANGE UP (ref 0.2–1.2)
BILIRUB UR-MCNC: NEGATIVE — SIGNIFICANT CHANGE UP
BUN SERPL-MCNC: 17 MG/DL — SIGNIFICANT CHANGE UP (ref 7–23)
CALCIUM SERPL-MCNC: 8.7 MG/DL — SIGNIFICANT CHANGE UP (ref 8.5–10.1)
CHLORIDE SERPL-SCNC: 103 MMOL/L — SIGNIFICANT CHANGE UP (ref 96–108)
CO2 SERPL-SCNC: 25 MMOL/L — SIGNIFICANT CHANGE UP (ref 22–31)
COLOR SPEC: SIGNIFICANT CHANGE UP
CREAT SERPL-MCNC: 0.68 MG/DL — SIGNIFICANT CHANGE UP (ref 0.5–1.3)
DIFF PNL FLD: ABNORMAL
EOSINOPHIL # BLD AUTO: 0.04 K/UL — SIGNIFICANT CHANGE UP (ref 0–0.5)
EOSINOPHIL NFR BLD AUTO: 0.6 % — SIGNIFICANT CHANGE UP (ref 0–6)
EPI CELLS # UR: SIGNIFICANT CHANGE UP
GLUCOSE SERPL-MCNC: 157 MG/DL — HIGH (ref 70–99)
GLUCOSE UR QL: 100 MG/DL
HCT VFR BLD CALC: 31.1 % — LOW (ref 34.5–45)
HGB BLD-MCNC: 11.3 G/DL — LOW (ref 11.5–15.5)
IMM GRANULOCYTES NFR BLD AUTO: 0.3 % — SIGNIFICANT CHANGE UP (ref 0–1.5)
KETONES UR-MCNC: ABNORMAL
LEUKOCYTE ESTERASE UR-ACNC: ABNORMAL
LYMPHOCYTES # BLD AUTO: 1.26 K/UL — SIGNIFICANT CHANGE UP (ref 1–3.3)
LYMPHOCYTES # BLD AUTO: 18.2 % — SIGNIFICANT CHANGE UP (ref 13–44)
MAGNESIUM SERPL-MCNC: 1.8 MG/DL — SIGNIFICANT CHANGE UP (ref 1.6–2.6)
MCHC RBC-ENTMCNC: 31.4 PG — SIGNIFICANT CHANGE UP (ref 27–34)
MCHC RBC-ENTMCNC: 36.3 GM/DL — HIGH (ref 32–36)
MCV RBC AUTO: 86.4 FL — SIGNIFICANT CHANGE UP (ref 80–100)
MONOCYTES # BLD AUTO: 0.57 K/UL — SIGNIFICANT CHANGE UP (ref 0–0.9)
MONOCYTES NFR BLD AUTO: 8.2 % — SIGNIFICANT CHANGE UP (ref 2–14)
NEUTROPHILS # BLD AUTO: 5.04 K/UL — SIGNIFICANT CHANGE UP (ref 1.8–7.4)
NEUTROPHILS NFR BLD AUTO: 72.6 % — SIGNIFICANT CHANGE UP (ref 43–77)
NITRITE UR-MCNC: NEGATIVE — SIGNIFICANT CHANGE UP
NRBC # BLD: 0 /100 WBCS — SIGNIFICANT CHANGE UP (ref 0–0)
NT-PROBNP SERPL-SCNC: 530 PG/ML — HIGH (ref 0–450)
PH UR: 7 — SIGNIFICANT CHANGE UP (ref 5–8)
PLATELET # BLD AUTO: 193 K/UL — SIGNIFICANT CHANGE UP (ref 150–400)
POTASSIUM SERPL-MCNC: 3.6 MMOL/L — SIGNIFICANT CHANGE UP (ref 3.5–5.3)
POTASSIUM SERPL-SCNC: 3.6 MMOL/L — SIGNIFICANT CHANGE UP (ref 3.5–5.3)
PROT SERPL-MCNC: 7 G/DL — SIGNIFICANT CHANGE UP (ref 6–8.3)
PROT UR-MCNC: 30 MG/DL
RBC # BLD: 3.6 M/UL — LOW (ref 3.8–5.2)
RBC # FLD: 12.3 % — SIGNIFICANT CHANGE UP (ref 10.3–14.5)
RBC CASTS # UR COMP ASSIST: SIGNIFICANT CHANGE UP /HPF (ref 0–4)
SODIUM SERPL-SCNC: 139 MMOL/L — SIGNIFICANT CHANGE UP (ref 135–145)
SP GR SPEC: 1 — LOW (ref 1.01–1.02)
TROPONIN I SERPL-MCNC: 0.08 NG/ML — HIGH (ref 0.01–0.04)
TROPONIN I SERPL-MCNC: 0.08 NG/ML — HIGH (ref 0.01–0.04)
UROBILINOGEN FLD QL: NEGATIVE — SIGNIFICANT CHANGE UP
WBC # BLD: 6.94 K/UL — SIGNIFICANT CHANGE UP (ref 3.8–10.5)
WBC # FLD AUTO: 6.94 K/UL — SIGNIFICANT CHANGE UP (ref 3.8–10.5)
WBC UR QL: SIGNIFICANT CHANGE UP

## 2020-10-16 PROCEDURE — 99284 EMERGENCY DEPT VISIT MOD MDM: CPT

## 2020-10-16 PROCEDURE — 99223 1ST HOSP IP/OBS HIGH 75: CPT | Mod: GC,AI

## 2020-10-16 PROCEDURE — 70450 CT HEAD/BRAIN W/O DYE: CPT | Mod: 26

## 2020-10-16 PROCEDURE — 93010 ELECTROCARDIOGRAM REPORT: CPT

## 2020-10-16 PROCEDURE — 71045 X-RAY EXAM CHEST 1 VIEW: CPT | Mod: 26

## 2020-10-16 RX ORDER — ACETAMINOPHEN 500 MG
650 TABLET ORAL ONCE
Refills: 0 | Status: DISCONTINUED | OUTPATIENT
Start: 2020-10-16 | End: 2020-10-16

## 2020-10-16 RX ORDER — ENOXAPARIN SODIUM 100 MG/ML
40 INJECTION SUBCUTANEOUS DAILY
Refills: 0 | Status: DISCONTINUED | OUTPATIENT
Start: 2020-10-16 | End: 2020-10-17

## 2020-10-16 RX ORDER — DEXTROSE 50 % IN WATER 50 %
25 SYRINGE (ML) INTRAVENOUS ONCE
Refills: 0 | Status: DISCONTINUED | OUTPATIENT
Start: 2020-10-16 | End: 2020-10-17

## 2020-10-16 RX ORDER — DEXTROSE 50 % IN WATER 50 %
15 SYRINGE (ML) INTRAVENOUS ONCE
Refills: 0 | Status: DISCONTINUED | OUTPATIENT
Start: 2020-10-16 | End: 2020-10-17

## 2020-10-16 RX ORDER — LORATADINE 10 MG/1
10 TABLET ORAL DAILY
Refills: 0 | Status: DISCONTINUED | OUTPATIENT
Start: 2020-10-16 | End: 2020-10-17

## 2020-10-16 RX ORDER — ACETAMINOPHEN 500 MG
650 TABLET ORAL EVERY 6 HOURS
Refills: 0 | Status: DISCONTINUED | OUTPATIENT
Start: 2020-10-16 | End: 2020-10-17

## 2020-10-16 RX ORDER — INSULIN LISPRO 100/ML
VIAL (ML) SUBCUTANEOUS AT BEDTIME
Refills: 0 | Status: DISCONTINUED | OUTPATIENT
Start: 2020-10-16 | End: 2020-10-17

## 2020-10-16 RX ORDER — ATORVASTATIN CALCIUM 80 MG/1
20 TABLET, FILM COATED ORAL AT BEDTIME
Refills: 0 | Status: DISCONTINUED | OUTPATIENT
Start: 2020-10-16 | End: 2020-10-17

## 2020-10-16 RX ORDER — PANTOPRAZOLE SODIUM 20 MG/1
40 TABLET, DELAYED RELEASE ORAL
Refills: 0 | Status: DISCONTINUED | OUTPATIENT
Start: 2020-10-16 | End: 2020-10-17

## 2020-10-16 RX ORDER — SODIUM CHLORIDE 9 MG/ML
1000 INJECTION, SOLUTION INTRAVENOUS
Refills: 0 | Status: DISCONTINUED | OUTPATIENT
Start: 2020-10-16 | End: 2020-10-17

## 2020-10-16 RX ORDER — LANOLIN ALCOHOL/MO/W.PET/CERES
3 CREAM (GRAM) TOPICAL AT BEDTIME
Refills: 0 | Status: DISCONTINUED | OUTPATIENT
Start: 2020-10-16 | End: 2020-10-17

## 2020-10-16 RX ORDER — INSULIN LISPRO 100/ML
VIAL (ML) SUBCUTANEOUS
Refills: 0 | Status: DISCONTINUED | OUTPATIENT
Start: 2020-10-16 | End: 2020-10-17

## 2020-10-16 RX ORDER — SODIUM CHLORIDE 9 MG/ML
1000 INJECTION INTRAMUSCULAR; INTRAVENOUS; SUBCUTANEOUS ONCE
Refills: 0 | Status: COMPLETED | OUTPATIENT
Start: 2020-10-16 | End: 2020-10-16

## 2020-10-16 RX ORDER — DEXTROSE 50 % IN WATER 50 %
12.5 SYRINGE (ML) INTRAVENOUS ONCE
Refills: 0 | Status: DISCONTINUED | OUTPATIENT
Start: 2020-10-16 | End: 2020-10-17

## 2020-10-16 RX ORDER — GLUCAGON INJECTION, SOLUTION 0.5 MG/.1ML
1 INJECTION, SOLUTION SUBCUTANEOUS ONCE
Refills: 0 | Status: DISCONTINUED | OUTPATIENT
Start: 2020-10-16 | End: 2020-10-17

## 2020-10-16 RX ORDER — ESCITALOPRAM OXALATE 10 MG/1
5 TABLET, FILM COATED ORAL DAILY
Refills: 0 | Status: DISCONTINUED | OUTPATIENT
Start: 2020-10-16 | End: 2020-10-17

## 2020-10-16 RX ADMIN — Medication 3 MILLIGRAM(S): at 23:11

## 2020-10-16 RX ADMIN — SODIUM CHLORIDE 1000 MILLILITER(S): 9 INJECTION INTRAMUSCULAR; INTRAVENOUS; SUBCUTANEOUS at 18:25

## 2020-10-16 RX ADMIN — Medication 650 MILLIGRAM(S): at 23:11

## 2020-10-16 RX ADMIN — SODIUM CHLORIDE 1000 MILLILITER(S): 9 INJECTION INTRAMUSCULAR; INTRAVENOUS; SUBCUTANEOUS at 19:42

## 2020-10-16 NOTE — H&P ADULT - PROBLEM SELECTOR PLAN 2
Chronic - on metformin at home  -started low dose ISS w/ hypoglycemia protocol   -F/u A1c -denies syncope - was found on the floor unable to get up  -she states she sat herself down on the floor - denies fall  -monitor on telemetry, check echo, trend trops  -check orthostatics

## 2020-10-16 NOTE — H&P ADULT - ASSESSMENT
79yo F PMHx DM type 2, Anxiety/Depression, GERD, and chronic back/leg pain presents with weakness admitted for elevated troponin to rule out ACS.

## 2020-10-16 NOTE — H&P ADULT - PROBLEM SELECTOR PLAN 6
Lovenox 40mg    BB IMPROVE VTE Individual Risk Assessment        RISK                                                          Points  [  ] Previous VTE                                                3  [  ] Thrombophilia                                             2  [  ] Lower limb paralysis                                   2        (unable to hold up >15 seconds)    [  ] Current Cancer                                            2         (within 6 months)  [  ] Immobilization > 24 hrs                              1  [  ] ICU/CCU stay > 24 hours                            1  [  ] Age > 60                                                    1  IMPROVE VTE Score ____1_____ Chronic  -Hold home Ibuprofen 400mg qd  -Continue with Tylenol 650 q6 prn

## 2020-10-16 NOTE — ED ADULT NURSE NOTE - OBJECTIVE STATEMENT
pt states she has weakness since last night, she fainted last night and was crying a lot and not eating.  her family called the ambulance today because she was weak.  patient c/o back and leg pain.    patient's son at bedside, states she woke up this morning at 06:00am confused, asking where her home is, family tried to re-orient her saying she is already at home, patient took a nap and woke up confused again at 10:00am.

## 2020-10-16 NOTE — H&P ADULT - PROBLEM SELECTOR PLAN 5
Chronic  -Hold home Ibuprofen 400mg qd  -Continue with Tylenol 650 q6 prn Chronic   -Continue Lexapro

## 2020-10-16 NOTE — ED PROVIDER NOTE - CLINICAL SUMMARY MEDICAL DECISION MAKING FREE TEXT BOX
here for generalized weakness, will search for cause: infectious, cardiac, neuro, electrolyte, metabolic.

## 2020-10-16 NOTE — H&P ADULT - PROBLEM SELECTOR PLAN 1
Pt presents with weakness, but denies chest pain, sob, sweating. On admission Top 0.076. Rule out ACS  -Admit to telemetry  -Trop 0.076x2   -EKG NSR with no acute ischemia  -CXR unremarkable  -CT head no acute findings  -UA borderline, but pt denies urinary symptoms thus no indication to treat  -Continue to trend trop  -Monitor hemodynamic and acute symptoms   -Cardio consulted ([facility]) Pt presents with weakness, but denies chest pain, sob, sweating. On admission Top 0.076. Rule out ACS  -Admit to telemetry - monitor for arrhythmia  -Trop 0.076x2, trend til peak  -EKG NSR with no acute ischemia  -CXR unremarkable  -CT head no acute findings  -UA borderline, but pt denies urinary symptoms thus no indication to treat  -Check echocardiogram  -Monitor hemodynamic and acute symptoms   -Cardio consulted ([facility])

## 2020-10-16 NOTE — H&P ADULT - NSICDXPASTMEDICALHX_GEN_ALL_CORE_FT
PAST MEDICAL HISTORY:  Anxiety and depression     Chronic joint pain     Diabetes mellitus, type 2     GERD (gastroesophageal reflux disease)     Seasonal allergies

## 2020-10-16 NOTE — H&P ADULT - NSHPSOCIALHISTORY_GEN_ALL_CORE
Past tobacco use  Denies alcohol and drug use   Lives with family. Ambulates independently   Regular diet

## 2020-10-16 NOTE — ED ADULT NURSE NOTE - NSIMPLEMENTINTERV_GEN_ALL_ED
Implemented All Fall Risk Interventions:  [place] to call system. Call bell, personal items and telephone within reach. Instruct patient to call for assistance. Room bathroom lighting operational. Non-slip footwear when patient is off stretcher. Physically safe environment: no spills, clutter or unnecessary equipment. Stretcher in lowest position, wheels locked, appropriate side rails in place. Provide visual cue, wrist band, yellow gown, etc. Monitor gait and stability. Monitor for mental status changes and reorient to person, place, and time. Review medications for side effects contributing to fall risk. Reinforce activity limits and safety measures with patient and family.

## 2020-10-16 NOTE — H&P ADULT - NSHPPHYSICALEXAM_GEN_ALL_CORE
T(C): 36.7 (10-16-20 @ 19:43), Max: 36.7 (10-16-20 @ 19:43)  HR: 76 (10-16-20 @ 19:43) (76 - 85)  BP: 166/73 (10-16-20 @ 19:43) (166/73 - 180/90)  RR: 16 (10-16-20 @ 19:43) (16 - 16)  SpO2: 98% (10-16-20 @ 19:43) (97% - 98%)    General: Well developed, well nourished, NAD  HEENT: NCAT, EOMI bl, moist mucous membranes   Neck: Supple  Neurology: A&Ox3, nonfocal, responds appropriately    Respiratory: CTA B/L, No W/R/R  CV: RRR, +S1/S2, no murmurs, rubs or gallops  Abdominal: Small reducible periumbilical hernia. Soft, NT, ND +BSx4  Extremities: No C/C/E, + peripheral pulses  MSK: Normal ROM  Skin: warm, dry T(C): 36.7 (10-16-20 @ 19:43), Max: 36.7 (10-16-20 @ 19:43)  HR: 76 (10-16-20 @ 19:43) (76 - 85)  BP: 166/73 (10-16-20 @ 19:43) (166/73 - 180/90)  RR: 16 (10-16-20 @ 19:43) (16 - 16)  SpO2: 98% (10-16-20 @ 19:43) (97% - 98%)    General: Well developed, well nourished, NAD  HEENT: NCAT, EOMI bl, moist mucous membranes   Neck: Supple  Neurology: A&Ox3, nonfocal, responds appropriately    Respiratory: CTA B/L, No W/R/R  CV: RRR, +S1/S2, no murmurs, rubs or gallops  Abdominal: Small reducible periumbilical hernia. Soft, NT, ND +BSx4  Extremities: No C/C/E, +peripheral pulses  MSK: Normal ROM  Skin: warm, dry

## 2020-10-16 NOTE — H&P ADULT - PROBLEM SELECTOR PLAN 7
VTE ppx: Lovenox 40mg    IMPROVE VTE Individual Risk Assessment        RISK                                                          Points  [  ] Previous VTE                                                3  [  ] Thrombophilia                                             2  [  ] Lower limb paralysis                                   2        (unable to hold up >15 seconds)    [  ] Current Cancer                                            2         (within 6 months)  [  ] Immobilization > 24 hrs                              1  [  ] ICU/CCU stay > 24 hours                            1  [  ] Age > 60                                                    1  IMPROVE VTE Score ____1_____

## 2020-10-16 NOTE — H&P ADULT - NSHPREVIEWOFSYSTEMS_GEN_ALL_CORE
Constitutional: Positive generalized weakness. denies fever, chills, diaphoresis   HEENT: denies blurry vision  Respiratory: denies SOB, cough, wheezing  Cardiovascular: denies CP, palpitations, edema  Gastrointestinal: denies nausea, vomiting, diarrhea, constipation, abdominal pain, melena, hematochezia   Genitourinary: denies dysuria, frequency, urgency  Musculoskeletal: Positive joint pain.   Neurologic: denies headache, dizziness, paresthesias, numbness/tingling  ROS negative except as noted above Constitutional: Positive generalized weakness. denies fever, chills, diaphoresis   HEENT: denies blurry vision  Respiratory: denies SOB, cough, wheezing  Cardiovascular: denies CP, palpitations, edema  Gastrointestinal: denies nausea, vomiting, diarrhea, constipation, abdominal pain, melena, hematochezia   Genitourinary: denies dysuria, frequency, urgency  Musculoskeletal: Positive joint pain.   Neurologic: denies paresthesias, numbness/tingling; admits headaches and dizziness with standing up  Psych: no changes in mood

## 2020-10-16 NOTE — ED ADULT NURSE REASSESSMENT NOTE - NS ED NURSE REASSESS COMMENT FT1
Patient on bed awake/ not in any distress. Son at the bedside. Patient awaiting transport for Cat scan.

## 2020-10-16 NOTE — ED PROVIDER NOTE - PHYSICAL EXAMINATION
Vital signs as available reviewed.  General:  Comfortable, no acute distress.  Head:  Normocephalic, atraumatic.  Eyes:  Conjunctiva pink, no icterus.  Cardiovascular:  Regular rate, no obvious murmur.  Respiratory:  Clear to auscultation, good air entry bilaterally.  Abdomen:  Soft, non-tender.  Musculoskeletal:  No deformity or calf tenderness.  Neurologic: Alert, not oriented, moving all extremities with 5/5 strength.  Skin:  Warm and dry.

## 2020-10-16 NOTE — ED PROVIDER NOTE - OBJECTIVE STATEMENT
per son, weakness, diarrhea confusion. 80 F history of DM, chronic leg/ back pain, ?dementia here with son complaining of weakness. Son report she was complaining of bilateral leg weakness and she was confused about what was happening. He also thinks she had one episode of diarrha that she cleaned up her self. Per patient via  prior to son's arrival patient had argument with him last night and then passed out. She also report she was crying all night.

## 2020-10-16 NOTE — ED PROVIDER NOTE - CARE PLAN
Principal Discharge DX:	Weakness  Secondary Diagnosis:	NSTEMI (non-ST elevated myocardial infarction)

## 2020-10-16 NOTE — H&P ADULT - PROBLEM SELECTOR PLAN 3
Chronic   -Continue ppi Chronic w/o long term use of insulin - on metformin at home  -started low dose ISS w/ hypoglycemia protocol   -F/u A1c

## 2020-10-16 NOTE — ED PROVIDER NOTE - NS ED ROS FT
Constitutional: No fever.  Neurological: Mild headache. + weakness  Eyes: No vision changes.   Ears, Nose, Mouth, Throat: No congestion.  Cardiovascular: No chest pain.  Respiratory: No difficulty breathing.  Gastrointestinal: No nausea or vomiting.  Genitourinary: No dysuria.  Musculoskeletal: No joint pain.  Integumentary (skin and/or breast): No rash.

## 2020-10-17 ENCOUNTER — TRANSCRIPTION ENCOUNTER (OUTPATIENT)
Age: 80
End: 2020-10-17

## 2020-10-17 VITALS
TEMPERATURE: 98 F | RESPIRATION RATE: 16 BRPM | OXYGEN SATURATION: 97 % | DIASTOLIC BLOOD PRESSURE: 73 MMHG | SYSTOLIC BLOOD PRESSURE: 148 MMHG | HEART RATE: 68 BPM

## 2020-10-17 DIAGNOSIS — R42 DIZZINESS AND GIDDINESS: ICD-10-CM

## 2020-10-17 LAB
A1C WITH ESTIMATED AVERAGE GLUCOSE RESULT: 8.2 % — HIGH (ref 4–5.6)
ANION GAP SERPL CALC-SCNC: 7 MMOL/L — SIGNIFICANT CHANGE UP (ref 5–17)
BUN SERPL-MCNC: 14 MG/DL — SIGNIFICANT CHANGE UP (ref 7–23)
CALCIUM SERPL-MCNC: 8.3 MG/DL — LOW (ref 8.5–10.1)
CHLORIDE SERPL-SCNC: 105 MMOL/L — SIGNIFICANT CHANGE UP (ref 96–108)
CK MB BLD-MCNC: 1.6 % — SIGNIFICANT CHANGE UP (ref 0–3.5)
CK MB CFR SERPL CALC: 3.6 NG/ML — SIGNIFICANT CHANGE UP (ref 0–3.6)
CK SERPL-CCNC: 228 U/L — HIGH (ref 26–192)
CO2 SERPL-SCNC: 27 MMOL/L — SIGNIFICANT CHANGE UP (ref 22–31)
CREAT SERPL-MCNC: 0.76 MG/DL — SIGNIFICANT CHANGE UP (ref 0.5–1.3)
CULTURE RESULTS: SIGNIFICANT CHANGE UP
ESTIMATED AVERAGE GLUCOSE: 189 MG/DL — HIGH (ref 68–114)
GLUCOSE SERPL-MCNC: 159 MG/DL — HIGH (ref 70–99)
HCT VFR BLD CALC: 28.9 % — LOW (ref 34.5–45)
HGB BLD-MCNC: 10.2 G/DL — LOW (ref 11.5–15.5)
MCHC RBC-ENTMCNC: 31.1 PG — SIGNIFICANT CHANGE UP (ref 27–34)
MCHC RBC-ENTMCNC: 35.3 GM/DL — SIGNIFICANT CHANGE UP (ref 32–36)
MCV RBC AUTO: 88.1 FL — SIGNIFICANT CHANGE UP (ref 80–100)
NRBC # BLD: 0 /100 WBCS — SIGNIFICANT CHANGE UP (ref 0–0)
PLATELET # BLD AUTO: 160 K/UL — SIGNIFICANT CHANGE UP (ref 150–400)
POTASSIUM SERPL-MCNC: 3.6 MMOL/L — SIGNIFICANT CHANGE UP (ref 3.5–5.3)
POTASSIUM SERPL-SCNC: 3.6 MMOL/L — SIGNIFICANT CHANGE UP (ref 3.5–5.3)
RBC # BLD: 3.28 M/UL — LOW (ref 3.8–5.2)
RBC # FLD: 12.2 % — SIGNIFICANT CHANGE UP (ref 10.3–14.5)
SARS-COV-2 IGG SERPL QL IA: POSITIVE
SARS-COV-2 IGM SERPL IA-ACNC: 105 AU/ML — HIGH
SARS-COV-2 RNA SPEC QL NAA+PROBE: SIGNIFICANT CHANGE UP
SODIUM SERPL-SCNC: 139 MMOL/L — SIGNIFICANT CHANGE UP (ref 135–145)
SPECIMEN SOURCE: SIGNIFICANT CHANGE UP
TROPONIN I SERPL-MCNC: 0.14 NG/ML — HIGH (ref 0.01–0.04)
TROPONIN I SERPL-MCNC: 0.18 NG/ML — HIGH (ref 0.01–0.04)
WBC # BLD: 5.1 K/UL — SIGNIFICANT CHANGE UP (ref 3.8–10.5)
WBC # FLD AUTO: 5.1 K/UL — SIGNIFICANT CHANGE UP (ref 3.8–10.5)

## 2020-10-17 PROCEDURE — 99239 HOSP IP/OBS DSCHRG MGMT >30: CPT

## 2020-10-17 PROCEDURE — 99222 1ST HOSP IP/OBS MODERATE 55: CPT

## 2020-10-17 PROCEDURE — 93306 TTE W/DOPPLER COMPLETE: CPT | Mod: 26

## 2020-10-17 RX ORDER — AMLODIPINE BESYLATE 2.5 MG/1
5 TABLET ORAL DAILY
Refills: 0 | Status: DISCONTINUED | OUTPATIENT
Start: 2020-10-17 | End: 2020-10-17

## 2020-10-17 RX ORDER — ACETAMINOPHEN 500 MG
2 TABLET ORAL
Qty: 0 | Refills: 0 | DISCHARGE
Start: 2020-10-17

## 2020-10-17 RX ORDER — AMLODIPINE BESYLATE 2.5 MG/1
1 TABLET ORAL
Qty: 7 | Refills: 0
Start: 2020-10-17 | End: 2020-10-23

## 2020-10-17 RX ADMIN — ENOXAPARIN SODIUM 40 MILLIGRAM(S): 100 INJECTION SUBCUTANEOUS at 11:46

## 2020-10-17 RX ADMIN — ESCITALOPRAM OXALATE 5 MILLIGRAM(S): 10 TABLET, FILM COATED ORAL at 11:46

## 2020-10-17 RX ADMIN — LORATADINE 10 MILLIGRAM(S): 10 TABLET ORAL at 11:46

## 2020-10-17 RX ADMIN — PANTOPRAZOLE SODIUM 40 MILLIGRAM(S): 20 TABLET, DELAYED RELEASE ORAL at 06:36

## 2020-10-17 RX ADMIN — AMLODIPINE BESYLATE 5 MILLIGRAM(S): 2.5 TABLET ORAL at 11:46

## 2020-10-17 NOTE — CONSULT NOTE ADULT - SUBJECTIVE AND OBJECTIVE BOX
[facility] - Rosales Galarza, Michelle, Marquez, Zbigniew Chen Savella  Office Number: [phone]    Initial Consult Note    CHIEF COMPLAINT: Patient is a 80y old  Female who presents with a chief complaint of elevated troponin      HPI:  79yo F PMHx DM type 2, Anxiety/Depression, GERD, and chronic back/leg pain presents with weakness. Pt is Kinyarwanda speaking with son at bedside who she lives with to help with translation. Son reports that pt woke up this morning confused and confabulated. It continued into the day. Later on she was sitting on the floor and unable to get up even with help. Pt did not fall. Family tried to help her. They were initially concerned that she was hypoglycemic, but she refused candy and juice. Family could not get her off floor thus they called EMS. Pt has no history of stroke, MI, or CHF. Pt has returned to baseline since then, no longer confused or confabulating. Pt denies chest pain, sob, sweating, abdominal pain, nausea, diarrhea, urinary symptoms, leg edema. She complains of headache as well as dizziness with standing up. Denies syncope.    VS: T 98.1 HR 76 /73 RR 16 SpO2 98% on RA  Labs: Hgb 11.3 Trop 0.076  UA borderline   CXR: unremarkable  CT head: no acute findings  EKG: NSR    In ED received 1L NS    Troponin has been mildly positive and flat.  The patient has never had any chest pain. Preliminary echocardiogram with normal LV systolic function.  Her EKG is normal.      PAST MEDICAL & SURGICAL HISTORY:  Chronic joint pain    GERD (gastroesophageal reflux disease)    Diabetes mellitus, type 2    Seasonal allergies    Anxiety and depression    S/P knee replacement        SOCIAL HISTORY:  No tobacco, ethanol, or drug abuse.    FAMILY HISTORY:  No pertinent family history in first degree relatives      No family history of acute MI or sudden cardiac death.    MEDICATIONS  (STANDING):  atorvastatin 20 milliGRAM(s) Oral at bedtime  dextrose 5%. 1000 milliLiter(s) (50 mL/Hr) IV Continuous <Continuous>  dextrose 50% Injectable 12.5 Gram(s) IV Push once  dextrose 50% Injectable 25 Gram(s) IV Push once  dextrose 50% Injectable 25 Gram(s) IV Push once  enoxaparin Injectable 40 milliGRAM(s) SubCutaneous daily  escitalopram 5 milliGRAM(s) Oral daily  insulin lispro (HumaLOG) corrective regimen sliding scale   SubCutaneous three times a day before meals  insulin lispro (HumaLOG) corrective regimen sliding scale   SubCutaneous at bedtime  loratadine 10 milliGRAM(s) Oral daily  melatonin 3 milliGRAM(s) Oral at bedtime  pantoprazole    Tablet 40 milliGRAM(s) Oral before breakfast    MEDICATIONS  (PRN):  acetaminophen   Tablet .. 650 milliGRAM(s) Oral every 6 hours PRN Mild Pain (1 - 3)  dextrose 40% Gel 15 Gram(s) Oral once PRN Blood Glucose LESS THAN 70 milliGRAM(s)/deciliter  glucagon  Injectable 1 milliGRAM(s) IntraMuscular once PRN Glucose LESS THAN 70 milligrams/deciliter      Allergies    No Known Allergies        REVIEW OF SYSTEMS:    All other review of systems is negative unless indicated above    VITAL SIGNS:   Vital Signs Last 24 Hrs  T(C): 36.7 (17 Oct 2020 07:15), Max: 36.9 (17 Oct 2020 04:50)  T(F): 98.1 (17 Oct 2020 07:15), Max: 98.4 (17 Oct 2020 04:50)  HR: 79 (17 Oct 2020 07:15) (70 - 85)  BP: 154/72 (17 Oct 2020 07:15) (125/70 - 180/90)  BP(mean): --  RR: 16 (17 Oct 2020 07:15) (16 - 16)  SpO2: 98% (17 Oct 2020 07:15) (92% - 98%)    I&O's Summary      On Exam:    Constitutional: NAD, alert and oriented x 3  Lungs:  Non-labored, breath sounds are clear bilaterally, No wheezing, rales or rhonchi  Cardiovascular: RRR.  S1 and S2 positive.  No murmurs, rubs, gallops or clicks  Gastrointestinal: Bowel Sounds present, soft, nontender.   Lymph: No peripheral edema. No cervical lymphadenopathy.  Neurological: Alert, no focal deficits  Skin: No rashes or ulcers   Psych:  Mood & affect appropriate.    LABS: All Labs Reviewed:                        10.2   5.10  )-----------( 160      ( 17 Oct 2020 05:39 )             28.9                         11.3   6.94  )-----------( 193      ( 16 Oct 2020 18:36 )             31.1     17 Oct 2020 05:39    139    |  105    |  14     ----------------------------<  159    3.6     |  27     |  0.76   16 Oct 2020 18:36    139    |  103    |  17     ----------------------------<  157    3.6     |  25     |  0.68     Ca    8.3        17 Oct 2020 05:39  Ca    8.7        16 Oct 2020 18:36  Mg     1.8       16 Oct 2020 18:36    TPro  7.0    /  Alb  3.7    /  TBili  0.5    /  DBili  x      /  AST  34     /  ALT  17     /  AlkPhos  71     16 Oct 2020 18:36      CARDIAC MARKERS ( 17 Oct 2020 05:39 )  .137 ng/mL / x     / 228 U/L / x     / 3.6 ng/mL  CARDIAC MARKERS ( 17 Oct 2020 00:25 )  .176 ng/mL / x     / x     / x     / x      CARDIAC MARKERS ( 16 Oct 2020 21:16 )  .076 ng/mL / x     / x     / x     / x      CARDIAC MARKERS ( 16 Oct 2020 18:36 )  .076 ng/mL / x     / x     / x     / x          Blood Culture:   10-16 @ 18:36  Pro Bnp 530        RADIOLOGY:  CXR negative.     EKG:  NSR with no acute ST or T wave changes

## 2020-10-17 NOTE — DISCHARGE NOTE NURSING/CASE MANAGEMENT/SOCIAL WORK - PATIENT PORTAL LINK FT
You can access the FollowMyHealth Patient Portal offered by [facility] by registering at the following website: [email or web address]. By joining [facility]’s FollowMyHealth portal, you will also be able to view your health information using other applications (apps) compatible with our system.

## 2020-10-17 NOTE — DISCHARGE NOTE PROVIDER - HOSPITAL COURSE
FROM ADMISSION H+P:   HPI:  81yo F PMHx DM type 2, Anxiety/Depression, GERD, and chronic back/leg pain presents with weakness. Pt is Malay speaking with son at bedside who she lives with to help with translation. Son reports that pt woke up this morning confused and confabulated. It continued into the day. Later on she was sitting on the floor and unable to get up even with help. Pt did not fall. Family tried to help her. They were initially concerned that she was hypoglycemic, but she refused candy and juice. Family could not get her off floor thus they called EMS. Pt has no history of stroke, MI, or CHF. Pt has returned to baseline since then, no longer confused or confabulating. Pt denies chest pain, sob, sweating, abdominal pain, nausea, diarrhea, urinary symptoms, leg edema. She complains of headache as well as dizziness with standing up. Denies syncope.    VS: T 98.1 HR 76 /73 RR 16 SpO2 98% on RA  Labs: Hgb 11.3 Trop 0.076  UA borderline   CXR: unremarkable  CT head: no acute findings  EKG: NSR    In ED received 1L NS (16 Oct 2020 22:32)      ---  HOSPITAL COURSE:   Patient admitted to telemetry for elevated troponin, r/o acute coronary syndrome. Patient asymptomatic on exam. Cardiac enzymes flat during hospital stay. [facility], consulted, and saw and examined patient, recommending outpatient nuclear stress test and workup. Preliminary TTE with normal LV systolic function. Patient to be discharged home on aspirin and statin in addition to home medications. Patient showed improvement throughout hospitalization. Patient was seen and examined on day of discharge. Patient was medically optimized for discharge home, with close outpatient follow up.        ---  CONSULTANTS:   [facility]    ---  TIME SPENT:  I, the attending physician, was physically present for the key portions of the evaluation and management (E/M) service provided. The total amount of time spent reviewing the hospital notes, laboratory values, imaging findings, assessing/counseling the patient, discussing with consultant physicians, social work, nursing staff was -- minutes    ---  Primary care provider was made aware of plan for discharge:      [  ] NO     [  ] YES   FROM ADMISSION H+P:   HPI:  81yo F PMHx DM type 2, Anxiety/Depression, GERD, and chronic back/leg pain presents with weakness. Pt is Croatian speaking with son at bedside who she lives with to help with translation. Son reports that pt woke up this morning confused and confabulated. It continued into the day. Later on she was sitting on the floor and unable to get up even with help. Pt did not fall. Family tried to help her. They were initially concerned that she was hypoglycemic, but she refused candy and juice. Family could not get her off floor thus they called EMS. Pt has no history of stroke, MI, or CHF. Pt has returned to baseline since then, no longer confused or confabulating. Pt denies chest pain, sob, sweating, abdominal pain, nausea, diarrhea, urinary symptoms, leg edema. She complains of headache as well as dizziness with standing up. Denies syncope.    VS: T 98.1 HR 76 /73 RR 16 SpO2 98% on RA  Labs: Hgb 11.3 Trop 0.076  UA borderline   CXR: unremarkable  CT head: no acute findings  EKG: NSR    In ED received 1L NS (16 Oct 2020 22:32)      ---  HOSPITAL COURSE:   Patient admitted to telemetry for elevated troponin, r/o acute coronary syndrome. Patient asymptomatic on exam. Cardiac enzymes flat during hospital stay. [facility], consulted, and saw and examined patient, recommending outpatient nuclear stress test and workup. Preliminary TTE with normal LV systolic function. Patient to be discharged home on aspirin and statin in addition to home medications. Patient showed improvement throughout hospitalization. Patient wanted to go home as soon as possible, d/w son , will f/u with cardiologist in 2 to 3 days. Cleared by Cardio Dr. Zuniga for discharge. Patient was seen and examined on day of discharge. Patient was medically optimized for discharge home, with close outpatient follow up.        ---  CONSULTANTS:   Cardiology- [facility]    ---  TIME SPENT:  I, the attending physician, was physically present for the key portions of the evaluation and management (E/M) service provided. The total amount of time spent reviewing the hospital notes, laboratory values, imaging findings, assessing/counseling the patient, discussing with consultant physicians, social work, nursing staff was 50  minutes    ---  Primary care provider was made aware of plan for discharge:      [  ] NO     [  ] YES

## 2020-10-17 NOTE — DISCHARGE NOTE PROVIDER - NSDCFUADDAPPT_GEN_ALL_CORE_FT
F/u with your cardiologist in 2 to 3 days.  f/u with PCP F/u with your cardiologist or Dr. Chen in 2 to 3 days.  f/u with PCP

## 2020-10-17 NOTE — DISCHARGE NOTE PROVIDER - NSDCMRMEDTOKEN_GEN_ALL_CORE_FT
acetaminophen 325 mg oral tablet: 2 tab(s) orally every 6 hours, As needed, Mild Pain (1 - 3)  amLODIPine 5 mg oral tablet: 1 tab(s) orally once a day  atorvastatin 20 mg oral tablet: 1 tab(s) orally once a day  Lexapro 5 mg oral tablet: 1 tab(s) orally once a day  loratadine 10 mg oral tablet: 1 tab(s) orally once a day  metFORMIN 750 mg oral tablet, extended release: 2 tab(s) orally once a day  omeprazole 20 mg oral delayed release tablet: 1 tab(s) orally once a day   acetaminophen 325 mg oral tablet: 2 tab(s) orally every 6 hours, As needed, Mild Pain (1 - 3)  amLODIPine 5 mg oral tablet: 1 tab(s) orally once a day  aspirin 81 mg oral tablet, chewable: 1 tab(s) orally once a day  atorvastatin 20 mg oral tablet: 1 tab(s) orally once a day  Lexapro 5 mg oral tablet: 1 tab(s) orally once a day  loratadine 10 mg oral tablet: 1 tab(s) orally once a day  metFORMIN 750 mg oral tablet, extended release: 2 tab(s) orally once a day  omeprazole 20 mg oral delayed release tablet: 1 tab(s) orally once a day

## 2020-10-17 NOTE — CONSULT NOTE ADULT - ASSESSMENT
81yo F PMHx DM type 2, Anxiety/Depression, GERD, and chronic back/leg pain presents with weakness. Pt is Bulgarian speaking with son at bedside who she lives with to help with translation. Son reports that pt woke up this morning confused and confabulated, positive troponin:    - NO evidence of acute ischemia.  Troponin is flat.  EKG normal.  Likely demand ischemia.  Needs outpatient nuclear stress test  - Preliminary echocardiogram with normal LV systolic function.  Follow up official read  - NO objection to d/c home with outpatient follow up.  - d/c on aspirin and statin drug

## 2020-10-17 NOTE — PATIENT PROFILE ADULT - CAREGIVER ADDRESS
05/28/20 1037   Discharge Reassessment   Assessment Type Discharge Planning Reassessment   Provided patient/caregiver education on the expected discharge date and the discharge plan Yes   Do you have any problems affording any of your prescribed medications? No   Discharge Plan A Skilled Nursing Facility   Discharge Plan B Skilled Nursing Facility   DME Needed Upon Discharge  none   Anticipated Discharge Disposition SNF   Post-Acute Status   Post-Acute Authorization Placement   Post-Acute Placement Status Awaiting Internal Medical Clearance      [address] [facility] [address]

## 2020-10-17 NOTE — PATIENT PROFILE ADULT - VISION (WITH CORRECTIVE LENSES IF THE PATIENT USUALLY WEARS THEM):
Partially impaired: cannot see medication labels or newsprint, but can see obstacles in path, and the surrounding layout; can count fingers at arm's length motivation to learn/acuteness of illness/interest in learning

## 2020-10-17 NOTE — DISCHARGE NOTE PROVIDER - NSDCCPCAREPLAN_GEN_ALL_CORE_FT
PRINCIPAL DISCHARGE DIAGNOSIS  Diagnosis: Elevated troponin level  Assessment and Plan of Treatment: Upon admission, your cardiax enzyme level was mildly elevated.   -You were seen by cardiologist, Dr. Zuniga, who recommended outpatient workup including a nuclear stress test, and to go over your echocardiogram results.   - Please continue with aspirin and statin medication.   - Please follow up with cardiologist, Dr. Zuniga, one week after discharge.   -Please follow up with your primary care physician, one week after discharge.       PRINCIPAL DISCHARGE DIAGNOSIS  Diagnosis: Elevated troponin level  Assessment and Plan of Treatment: -Upon admission, your cardiax enzyme level was mildly elevated. Unclear etiology, further work up as out patient, per cardio doubt ACS, more like demand ischemia.   -You were seen by cardiologist, Dr. Zuniga, who recommended outpatient workup including a nuclear stress test, and to go over your echocardiogram results.   - Please continue with aspirin and statin medication.   - Please follow up with cardiologist, Dr. Zuniga, one week after discharge.   -Please follow up with your primary care physician, one week after discharge.

## 2020-10-17 NOTE — DISCHARGE NOTE PROVIDER - CARE PROVIDER_API CALL
Johan Chen)  Internal Medicine  [address] [place] [address]  Phone: [phone]  Fax: [phone]  Follow Up Time:

## 2020-10-20 RX ORDER — OMEPRAZOLE 10 MG/1
0 CAPSULE, DELAYED RELEASE ORAL
Qty: 0 | Refills: 0 | DISCHARGE

## 2020-10-20 RX ORDER — IBUPROFEN 200 MG
1 TABLET ORAL
Qty: 0 | Refills: 0 | DISCHARGE

## 2020-10-20 RX ORDER — LORATADINE 10 MG/1
0 TABLET ORAL
Qty: 0 | Refills: 0 | DISCHARGE

## 2020-10-20 RX ORDER — METFORMIN HYDROCHLORIDE 850 MG/1
0 TABLET ORAL
Qty: 0 | Refills: 0 | DISCHARGE

## 2020-10-20 RX ORDER — ESCITALOPRAM OXALATE 10 MG/1
0 TABLET, FILM COATED ORAL
Qty: 0 | Refills: 0 | DISCHARGE

## 2020-10-20 RX ORDER — ATORVASTATIN CALCIUM 80 MG/1
0 TABLET, FILM COATED ORAL
Qty: 0 | Refills: 0 | DISCHARGE

## 2020-10-21 PROCEDURE — 83036 HEMOGLOBIN GLYCOSYLATED A1C: CPT

## 2020-10-21 PROCEDURE — 86769 SARS-COV-2 COVID-19 ANTIBODY: CPT

## 2020-10-21 PROCEDURE — 99285 EMERGENCY DEPT VISIT HI MDM: CPT | Mod: 25

## 2020-10-21 PROCEDURE — 82553 CREATINE MB FRACTION: CPT

## 2020-10-21 PROCEDURE — 81001 URINALYSIS AUTO W/SCOPE: CPT

## 2020-10-21 PROCEDURE — 80048 BASIC METABOLIC PNL TOTAL CA: CPT

## 2020-10-21 PROCEDURE — 71045 X-RAY EXAM CHEST 1 VIEW: CPT

## 2020-10-21 PROCEDURE — 36415 COLL VENOUS BLD VENIPUNCTURE: CPT

## 2020-10-21 PROCEDURE — 83735 ASSAY OF MAGNESIUM: CPT

## 2020-10-21 PROCEDURE — 83880 ASSAY OF NATRIURETIC PEPTIDE: CPT

## 2020-10-21 PROCEDURE — 84484 ASSAY OF TROPONIN QUANT: CPT

## 2020-10-21 PROCEDURE — 85025 COMPLETE CBC W/AUTO DIFF WBC: CPT

## 2020-10-21 PROCEDURE — 93005 ELECTROCARDIOGRAM TRACING: CPT

## 2020-10-21 PROCEDURE — 70450 CT HEAD/BRAIN W/O DYE: CPT

## 2020-10-21 PROCEDURE — 80053 COMPREHEN METABOLIC PANEL: CPT

## 2020-10-21 PROCEDURE — 96360 HYDRATION IV INFUSION INIT: CPT

## 2020-10-21 PROCEDURE — U0003: CPT

## 2020-10-21 PROCEDURE — 85027 COMPLETE CBC AUTOMATED: CPT

## 2020-10-21 PROCEDURE — 82550 ASSAY OF CK (CPK): CPT

## 2020-10-21 PROCEDURE — 93306 TTE W/DOPPLER COMPLETE: CPT

## 2020-10-21 PROCEDURE — 87086 URINE CULTURE/COLONY COUNT: CPT

## 2020-10-21 PROCEDURE — 82962 GLUCOSE BLOOD TEST: CPT

## 2021-01-05 LAB
ALBUMIN SERPL ELPH-MCNC: 4.6 G/DL
ALP BLD-CCNC: 73 U/L
ALT SERPL-CCNC: 17 U/L
ANION GAP SERPL CALC-SCNC: 13 MMOL/L
AST SERPL-CCNC: 24 U/L
BILIRUB SERPL-MCNC: 0.3 MG/DL
BUN SERPL-MCNC: 33 MG/DL
CALCIUM SERPL-MCNC: 9.9 MG/DL
CHLORIDE SERPL-SCNC: 102 MMOL/L
CO2 SERPL-SCNC: 24 MMOL/L
CREAT SERPL-MCNC: 0.93 MG/DL
GLUCOSE SERPL-MCNC: 186 MG/DL
POTASSIUM SERPL-SCNC: 5 MMOL/L
PROT SERPL-MCNC: 7.2 G/DL
SODIUM SERPL-SCNC: 140 MMOL/L

## 2021-05-03 ENCOUNTER — EMERGENCY (EMERGENCY)
Facility: HOSPITAL | Age: 81
LOS: 1 days | Discharge: HOME | End: 2021-05-03
Attending: EMERGENCY MEDICINE | Admitting: EMERGENCY MEDICINE
Payer: MEDICARE

## 2021-05-03 VITALS
DIASTOLIC BLOOD PRESSURE: 79 MMHG | HEIGHT: 61 IN | HEART RATE: 75 BPM | OXYGEN SATURATION: 96 % | SYSTOLIC BLOOD PRESSURE: 145 MMHG | RESPIRATION RATE: 16 BRPM | TEMPERATURE: 98 F

## 2021-05-03 DIAGNOSIS — Z96.659 PRESENCE OF UNSPECIFIED ARTIFICIAL KNEE JOINT: Chronic | ICD-10-CM

## 2021-05-03 DIAGNOSIS — Z90.3 ACQUIRED ABSENCE OF STOMACH [PART OF]: Chronic | ICD-10-CM

## 2021-05-03 DIAGNOSIS — Z98.890 OTHER SPECIFIED POSTPROCEDURAL STATES: Chronic | ICD-10-CM

## 2021-05-03 DIAGNOSIS — Z90.49 ACQUIRED ABSENCE OF OTHER SPECIFIED PARTS OF DIGESTIVE TRACT: Chronic | ICD-10-CM

## 2021-05-03 PROBLEM — J30.2 OTHER SEASONAL ALLERGIC RHINITIS: Chronic | Status: ACTIVE | Noted: 2020-10-16

## 2021-05-03 PROBLEM — F41.9 ANXIETY DISORDER, UNSPECIFIED: Chronic | Status: ACTIVE | Noted: 2020-10-16

## 2021-05-03 PROBLEM — K21.9 GASTRO-ESOPHAGEAL REFLUX DISEASE WITHOUT ESOPHAGITIS: Chronic | Status: ACTIVE | Noted: 2020-10-16

## 2021-05-03 PROBLEM — E11.9 TYPE 2 DIABETES MELLITUS WITHOUT COMPLICATIONS: Chronic | Status: ACTIVE | Noted: 2020-10-16

## 2021-05-03 PROBLEM — M25.50 PAIN IN UNSPECIFIED JOINT: Chronic | Status: ACTIVE | Noted: 2020-10-16

## 2021-05-03 LAB
ALBUMIN SERPL ELPH-MCNC: 3.4 G/DL — SIGNIFICANT CHANGE UP (ref 3.3–5)
ALP SERPL-CCNC: 68 U/L — SIGNIFICANT CHANGE UP (ref 40–120)
ALT FLD-CCNC: 13 U/L — SIGNIFICANT CHANGE UP (ref 12–78)
ANION GAP SERPL CALC-SCNC: 9 MMOL/L — SIGNIFICANT CHANGE UP (ref 5–17)
APTT BLD: 33.5 SEC — SIGNIFICANT CHANGE UP (ref 27.5–35.5)
AST SERPL-CCNC: 19 U/L — SIGNIFICANT CHANGE UP (ref 15–37)
BASOPHILS # BLD AUTO: 0.01 K/UL — SIGNIFICANT CHANGE UP (ref 0–0.2)
BASOPHILS NFR BLD AUTO: 0.2 % — SIGNIFICANT CHANGE UP (ref 0–2)
BILIRUB SERPL-MCNC: 0.3 MG/DL — SIGNIFICANT CHANGE UP (ref 0.2–1.2)
BLD GP AB SCN SERPL QL: SIGNIFICANT CHANGE UP
BUN SERPL-MCNC: 26 MG/DL — HIGH (ref 7–23)
CALCIUM SERPL-MCNC: 8.7 MG/DL — SIGNIFICANT CHANGE UP (ref 8.5–10.1)
CHLORIDE SERPL-SCNC: 100 MMOL/L — SIGNIFICANT CHANGE UP (ref 96–108)
CO2 SERPL-SCNC: 25 MMOL/L — SIGNIFICANT CHANGE UP (ref 22–31)
CREAT SERPL-MCNC: 0.86 MG/DL — SIGNIFICANT CHANGE UP (ref 0.5–1.3)
D DIMER BLD IA.RAPID-MCNC: 307 NG/ML DDU — HIGH
EOSINOPHIL # BLD AUTO: 0.06 K/UL — SIGNIFICANT CHANGE UP (ref 0–0.5)
EOSINOPHIL NFR BLD AUTO: 1.4 % — SIGNIFICANT CHANGE UP (ref 0–6)
GLUCOSE SERPL-MCNC: 168 MG/DL — HIGH (ref 70–99)
HCT VFR BLD CALC: 29.2 % — LOW (ref 34.5–45)
HGB BLD-MCNC: 10.4 G/DL — LOW (ref 11.5–15.5)
IMM GRANULOCYTES NFR BLD AUTO: 0.7 % — SIGNIFICANT CHANGE UP (ref 0–1.5)
INR BLD: 1.03 RATIO — SIGNIFICANT CHANGE UP (ref 0.88–1.16)
LIDOCAIN IGE QN: 136 U/L — SIGNIFICANT CHANGE UP (ref 73–393)
LYMPHOCYTES # BLD AUTO: 1.82 K/UL — SIGNIFICANT CHANGE UP (ref 1–3.3)
LYMPHOCYTES # BLD AUTO: 42.9 % — SIGNIFICANT CHANGE UP (ref 13–44)
MCHC RBC-ENTMCNC: 30.8 PG — SIGNIFICANT CHANGE UP (ref 27–34)
MCHC RBC-ENTMCNC: 35.6 GM/DL — SIGNIFICANT CHANGE UP (ref 32–36)
MCV RBC AUTO: 86.4 FL — SIGNIFICANT CHANGE UP (ref 80–100)
MONOCYTES # BLD AUTO: 0.39 K/UL — SIGNIFICANT CHANGE UP (ref 0–0.9)
MONOCYTES NFR BLD AUTO: 9.2 % — SIGNIFICANT CHANGE UP (ref 2–14)
NEUTROPHILS # BLD AUTO: 1.93 K/UL — SIGNIFICANT CHANGE UP (ref 1.8–7.4)
NEUTROPHILS NFR BLD AUTO: 45.6 % — SIGNIFICANT CHANGE UP (ref 43–77)
NRBC # BLD: 0 /100 WBCS — SIGNIFICANT CHANGE UP (ref 0–0)
PLATELET # BLD AUTO: 196 K/UL — SIGNIFICANT CHANGE UP (ref 150–400)
POTASSIUM SERPL-MCNC: 4.4 MMOL/L — SIGNIFICANT CHANGE UP (ref 3.5–5.3)
POTASSIUM SERPL-SCNC: 4.4 MMOL/L — SIGNIFICANT CHANGE UP (ref 3.5–5.3)
PROT SERPL-MCNC: 7.1 G/DL — SIGNIFICANT CHANGE UP (ref 6–8.3)
PROTHROM AB SERPL-ACNC: 12 SEC — SIGNIFICANT CHANGE UP (ref 10.6–13.6)
RBC # BLD: 3.38 M/UL — LOW (ref 3.8–5.2)
RBC # FLD: 12.2 % — SIGNIFICANT CHANGE UP (ref 10.3–14.5)
SODIUM SERPL-SCNC: 134 MMOL/L — LOW (ref 135–145)
TROPONIN I SERPL-MCNC: <.015 NG/ML — SIGNIFICANT CHANGE UP (ref 0.01–0.04)
WBC # BLD: 4.24 K/UL — SIGNIFICANT CHANGE UP (ref 3.8–10.5)
WBC # FLD AUTO: 4.24 K/UL — SIGNIFICANT CHANGE UP (ref 3.8–10.5)

## 2021-05-03 PROCEDURE — 84484 ASSAY OF TROPONIN QUANT: CPT

## 2021-05-03 PROCEDURE — 85730 THROMBOPLASTIN TIME PARTIAL: CPT

## 2021-05-03 PROCEDURE — 80053 COMPREHEN METABOLIC PANEL: CPT

## 2021-05-03 PROCEDURE — 93010 ELECTROCARDIOGRAM REPORT: CPT

## 2021-05-03 PROCEDURE — 86900 BLOOD TYPING SEROLOGIC ABO: CPT

## 2021-05-03 PROCEDURE — 36415 COLL VENOUS BLD VENIPUNCTURE: CPT

## 2021-05-03 PROCEDURE — 71045 X-RAY EXAM CHEST 1 VIEW: CPT | Mod: 26

## 2021-05-03 PROCEDURE — 71045 X-RAY EXAM CHEST 1 VIEW: CPT

## 2021-05-03 PROCEDURE — 85610 PROTHROMBIN TIME: CPT

## 2021-05-03 PROCEDURE — 96374 THER/PROPH/DIAG INJ IV PUSH: CPT | Mod: XU

## 2021-05-03 PROCEDURE — 74177 CT ABD & PELVIS W/CONTRAST: CPT | Mod: 26,MA

## 2021-05-03 PROCEDURE — 86850 RBC ANTIBODY SCREEN: CPT

## 2021-05-03 PROCEDURE — 96361 HYDRATE IV INFUSION ADD-ON: CPT

## 2021-05-03 PROCEDURE — 99284 EMERGENCY DEPT VISIT MOD MDM: CPT | Mod: 25

## 2021-05-03 PROCEDURE — 85379 FIBRIN DEGRADATION QUANT: CPT

## 2021-05-03 PROCEDURE — 71275 CT ANGIOGRAPHY CHEST: CPT

## 2021-05-03 PROCEDURE — 83690 ASSAY OF LIPASE: CPT

## 2021-05-03 PROCEDURE — 86901 BLOOD TYPING SEROLOGIC RH(D): CPT

## 2021-05-03 PROCEDURE — 74177 CT ABD & PELVIS W/CONTRAST: CPT

## 2021-05-03 PROCEDURE — 93005 ELECTROCARDIOGRAM TRACING: CPT

## 2021-05-03 PROCEDURE — 99285 EMERGENCY DEPT VISIT HI MDM: CPT

## 2021-05-03 PROCEDURE — 85025 COMPLETE CBC W/AUTO DIFF WBC: CPT

## 2021-05-03 PROCEDURE — 71275 CT ANGIOGRAPHY CHEST: CPT | Mod: 26,MA

## 2021-05-03 RX ORDER — MORPHINE SULFATE 50 MG/1
4 CAPSULE, EXTENDED RELEASE ORAL ONCE
Refills: 0 | Status: DISCONTINUED | OUTPATIENT
Start: 2021-05-03 | End: 2021-05-03

## 2021-05-03 RX ORDER — SODIUM CHLORIDE 9 MG/ML
1000 INJECTION INTRAMUSCULAR; INTRAVENOUS; SUBCUTANEOUS ONCE
Refills: 0 | Status: COMPLETED | OUTPATIENT
Start: 2021-05-03 | End: 2021-05-03

## 2021-05-03 RX ORDER — TRAMADOL HYDROCHLORIDE 50 MG/1
1 TABLET ORAL
Qty: 20 | Refills: 0
Start: 2021-05-03

## 2021-05-03 RX ORDER — ONDANSETRON 8 MG/1
4 TABLET, FILM COATED ORAL ONCE
Refills: 0 | Status: COMPLETED | OUTPATIENT
Start: 2021-05-03 | End: 2021-05-03

## 2021-05-03 RX ORDER — OXYCODONE AND ACETAMINOPHEN 5; 325 MG/1; MG/1
1 TABLET ORAL ONCE
Refills: 0 | Status: DISCONTINUED | OUTPATIENT
Start: 2021-05-03 | End: 2021-05-03

## 2021-05-03 RX ADMIN — OXYCODONE AND ACETAMINOPHEN 1 TABLET(S): 5; 325 TABLET ORAL at 21:33

## 2021-05-03 RX ADMIN — MORPHINE SULFATE 4 MILLIGRAM(S): 50 CAPSULE, EXTENDED RELEASE ORAL at 17:21

## 2021-05-03 RX ADMIN — SODIUM CHLORIDE 1000 MILLILITER(S): 9 INJECTION INTRAMUSCULAR; INTRAVENOUS; SUBCUTANEOUS at 18:06

## 2021-05-03 RX ADMIN — MORPHINE SULFATE 4 MILLIGRAM(S): 50 CAPSULE, EXTENDED RELEASE ORAL at 17:06

## 2021-05-03 RX ADMIN — OXYCODONE AND ACETAMINOPHEN 1 TABLET(S): 5; 325 TABLET ORAL at 22:25

## 2021-05-03 RX ADMIN — SODIUM CHLORIDE 1000 MILLILITER(S): 9 INJECTION INTRAMUSCULAR; INTRAVENOUS; SUBCUTANEOUS at 17:06

## 2021-05-03 RX ADMIN — SODIUM CHLORIDE 1000 MILLILITER(S): 9 INJECTION INTRAMUSCULAR; INTRAVENOUS; SUBCUTANEOUS at 22:30

## 2021-05-03 RX ADMIN — SODIUM CHLORIDE 1000 MILLILITER(S): 9 INJECTION INTRAMUSCULAR; INTRAVENOUS; SUBCUTANEOUS at 21:08

## 2021-05-03 NOTE — ED PROVIDER NOTE - NSFOLLOWUPINSTRUCTIONS_ED_ALL_ED_FT
Ultram for pain with caution   tylenol for mild pain  follow up with your primary care doctor   follow up with on call orhtopedist dr Downey  follow up with pulmonary dr Ni  Use incentive spirometry 10 times each hour while awake  Rib Fracture(s)    A rib fracture is a break or crack in one of the bones of the ribs. The ribs are a group of long, curved bones that wrap around your chest and attach to your spine. A broken or cracked rib is often painful, but most do not cause other problems and heal on their own over time. Symptoms include pain when you breath or cough or pain when pressing over the area. Breathing exercises will help keep your lungs inflated and prevent lung collapse or infection.    SEEK IMMEDIATE MEDICAL CARE IF YOU HAVE ANY OF THE FOLLOWING SYMPTOMS: fever, shortness of breath, coughing up blood, abdominal pain, nausea or vomiting, pain not controlled with medications.

## 2021-05-03 NOTE — ED PROVIDER NOTE - CARE PROVIDER_API CALL
Mathieu Ni)  Critical Care Medicine; Internal Medicine; Pulmonary Disease  [address] [place] [address]  Phone: [phone]  Fax: [phone]  Follow Up Time:     Tiffany Downey)  Orthopaedic Surgery Surgery  [address] [place] [address]  Phone: [phone]  Fax: [phone]  Follow Up Time:

## 2021-05-03 NOTE — ED PROVIDER NOTE - ATTENDING CONTRIBUTION TO CARE
pt is a 81 yo f who has pmhx sig for pancreatitsi depression dm tkr ccy and partial gastrectomy has been haivng abdpain for a week was seen last week at [facility] then latter in week by pmd started on twice dailiy ppi but pt still has the epigastric pain. now sefvefe so she came to er with son for eval  pt speaks primarily Pasthu and son is to translate at pt request.   on eval  wd wn female pt who appears uncomfortablke holdign left upper abd pain worsens on sitting up  heent pallor no g f r no teeth no cyanosis  neck supple  cor rrr chest cta  abd tender luq with guarding no cvat n orebound no pain in r side or lower abd  ext normal  skin normal  plan ro perforatoin ro gastritis ro other causes of sevver pain in adult female with hx of gastric surgeyr

## 2021-05-03 NOTE — ED PROVIDER NOTE - CARE PLAN
Principal Discharge DX:	Abdominal pain   Principal Discharge DX:	Abdominal pain  Secondary Diagnosis:	Closed fracture of multiple ribs of left side, initial encounter

## 2021-05-03 NOTE — ED ADULT NURSE NOTE - PSH
History of back surgery  (2007)  S/P arthroscopic knee surgery  (Right knee, 2007)  S/P cholecystectomy  (2002)  S/P knee replacement    S/P subtotal gastrectomy  (with gastrojejunal anastomosis, 30 years ago)

## 2021-05-03 NOTE — ED PROVIDER NOTE - CLINICAL SUMMARY MEDICAL DECISION MAKING FREE TEXT BOX
Pt is a 81 yo female with LUQ pain will get ekg labs ct scan cxr Pt is a 81 yo female with LUQ pain will get ekg labs ct scan cxr ro pn ro perforation ro pe  ct cta labs re eval pain control  cta reveals rib fx incentive spirometry

## 2021-05-03 NOTE — ED ADULT NURSE NOTE - PMH
Anemia    Anxiety    Anxiety and depression    Chronic joint pain    Diabetes mellitus, type 2    GERD (gastroesophageal reflux disease)    OA (osteoarthritis) of knee  (Right knee)  Other chronic pancreatitis    Seasonal allergies    Type 2 diabetes mellitus

## 2021-05-03 NOTE — ED PROVIDER NOTE - PATIENT PORTAL LINK FT
You can access the FollowMyHealth Patient Portal offered by [facility] by registering at the following website: [email or web address]. By joining [facility]’s FollowMyHealth portal, you will also be able to view your health information using other applications (apps) compatible with our system.

## 2021-05-03 NOTE — ED ADULT NURSE NOTE - NSIMPLEMENTINTERV_GEN_ALL_ED
Implemented All Universal Safety Interventions:  [place] to call system. Call bell, personal items and telephone within reach. Instruct patient to call for assistance. Room bathroom lighting operational. Non-slip footwear when patient is off stretcher. Physically safe environment: no spills, clutter or unnecessary equipment. Stretcher in lowest position, wheels locked, appropriate side rails in place.

## 2021-05-03 NOTE — ED PROVIDER NOTE - PROGRESS NOTE DETAILS
pt re evaluated eating but pain returned, pos d dimer, will get cta results given to son at bedside will give percocet cta reveals no pe but multiple rib fracturs and nodules this report was printed and provided to son at bedside who is aware pt will need further evaluation and care of the fractures nodules

## 2021-05-03 NOTE — ED PROVIDER NOTE - OBJECTIVE STATEMENT
Pt is a 79 yo female with pmhx of Anemia Anxiety and depression Chronic joint pain Diabetes mellitus, type 2 GERD OA Other chronic pancreatitis Seasonal allergies S/P cholecystectomy  (2002) S/P subtotal gastrectomy  (with gastrojejunal anastomosis, 30 years ago) here for constant LUQ pain for one week no associated nvd fever chills chest pain sob cough dizziness. Pain radiating to epigastric area.     pcp Yajaira Nieves

## 2021-05-04 VITALS
OXYGEN SATURATION: 98 % | TEMPERATURE: 98 F | HEART RATE: 67 BPM | SYSTOLIC BLOOD PRESSURE: 137 MMHG | RESPIRATION RATE: 17 BRPM | DIASTOLIC BLOOD PRESSURE: 73 MMHG

## 2021-05-07 ENCOUNTER — APPOINTMENT (OUTPATIENT)
Dept: ORTHOPEDIC SURGERY | Facility: CLINIC | Age: 81
End: 2021-05-07
Payer: MEDICARE

## 2021-05-07 VITALS
HEART RATE: 82 BPM | BODY MASS INDEX: 23.6 KG/M2 | HEIGHT: 61 IN | DIASTOLIC BLOOD PRESSURE: 84 MMHG | WEIGHT: 125 LBS | SYSTOLIC BLOOD PRESSURE: 144 MMHG

## 2021-05-07 PROCEDURE — 99212 OFFICE O/P EST SF 10 MIN: CPT

## 2021-05-25 ENCOUNTER — APPOINTMENT (OUTPATIENT)
Dept: PULMONOLOGY | Facility: CLINIC | Age: 81
End: 2021-05-25
Payer: MEDICARE

## 2021-05-25 VITALS
OXYGEN SATURATION: 98 % | SYSTOLIC BLOOD PRESSURE: 136 MMHG | DIASTOLIC BLOOD PRESSURE: 80 MMHG | BODY MASS INDEX: 21.35 KG/M2 | TEMPERATURE: 98.1 F | WEIGHT: 113 LBS | RESPIRATION RATE: 15 BRPM | HEART RATE: 76 BPM

## 2021-05-25 DIAGNOSIS — S22.42XA MULTIPLE FRACTURES OF RIBS, LEFT SIDE, INITIAL ENCOUNTER FOR CLOSED FRACTURE: ICD-10-CM

## 2021-05-25 DIAGNOSIS — R91.1 SOLITARY PULMONARY NODULE: ICD-10-CM

## 2021-05-25 PROCEDURE — 99205 OFFICE O/P NEW HI 60 MIN: CPT

## 2021-05-26 PROBLEM — S22.42XA CLOSED FRACTURE OF MULTIPLE RIBS OF LEFT SIDE, INITIAL ENCOUNTER: Status: ACTIVE | Noted: 2021-05-07

## 2021-05-26 NOTE — HISTORY OF PRESENT ILLNESS
[< 30 pack-years] : < 30 pack-years [TextBox_4] : Ms. FRANKI VALVERDE is a 80 year old woman with DM who is here for abnormal CT chest. Accompanied by son Carolyn who is translating for her. \par \par Was seen in ER for LUQ pain. CT a/p did not show acute pathology. CT chest showed minimally displaced left lat 3,4th,5th rib fractures, trace left pleural effusion, 6mm RUL nodule and mosaic attenuation. There was no e/o PE and PA size was normal. \par \par She denies any pulmonary issues, denies HERNANDEZ, wheezing, cough. She is a never smoker but did have extensive biomass fumes exposure. \par \par PMH: DM\par Meds: Omeprazole\par All: NKDA\par SH: smoked 40 years ago, from [place], cooked on wood burning stove\par FH: parents were healthy\par PMD: Dr Lizbeth Irving\par Immunizations: fully covid vaccinated. \par  [TextBox_11] : 0.25 [TextBox_13] : 5 [YearQuit] : 1980

## 2021-05-26 NOTE — ASSESSMENT
[FreeTextEntry1] : Ms. FRANKI VALVERDE is a 80 year old woman with DM who is here for abnormal CT chest. Accompanied by son Carolyn who is translating for her. \par CT chest showed minimally displaced left lat 3,4th,5th rib fractures, trace left pleural effusion, 6mm RUL nodule and mosaic attenuation. There was no e/o PE and PA size was normal. \par \par #L pleff - minimal, likely related to rib fractures\par \par #Rib fractures - patient and son deny fall\par -- pain control with Tylenol\par -- Incentive spirometry/breathing exercises\par \par #6mm nodule - f/u in one year\par \par #Mosaic attenuation- could be related to airway disease vs volume overload vs less likely pulm HTN. \par Will start workup by obtaining PFTs\par \par All questions answered. Patient in agreement with plan. \par I will call son with PFT results. \par \par

## 2021-05-26 NOTE — CONSULT LETTER
[Dear  ___] : Dear  [unfilled], [Consult Letter:] : I had the pleasure of evaluating your patient, [unfilled]. [Please see my note below.] : Please see my note below. [Consult Closing:] : Thank you very much for allowing me to participate in the care of this patient.  If you have any questions, please do not hesitate to contact me. [FreeTextEntry3] : Sincerely,\par \par Marta Colunga MD\par [facility] Physician [facility]\par Pulmonary Medicine\par tel: [phone]\par fax: [phone]\par

## 2021-07-16 ENCOUNTER — APPOINTMENT (OUTPATIENT)
Dept: DISASTER EMERGENCY | Facility: CLINIC | Age: 81
End: 2021-07-16

## 2021-07-19 ENCOUNTER — APPOINTMENT (OUTPATIENT)
Dept: PULMONOLOGY | Facility: CLINIC | Age: 81
End: 2021-07-19

## 2022-06-24 NOTE — ED PROVIDER NOTE - DATE/TIME 1
Problem: Patient Education: Go to Patient Education Activity  Goal: Patient/Family Education  Outcome: Progressing Towards Goal     Problem: Pain  Goal: *Control of Pain  Outcome: Progressing Towards Goal  Goal: *PALLIATIVE CARE:  Alleviation of Pain  Outcome: Progressing Towards Goal     Problem: Patient Education: Go to Patient Education Activity  Goal: Patient/Family Education  Outcome: Progressing Towards Goal     Problem: Falls - Risk of  Goal: *Absence of Falls  Description: Document Yinka Fall Risk and appropriate interventions in the flowsheet.   Outcome: Progressing Towards Goal  Note: Fall Risk Interventions:  Mobility Interventions: Bed/chair exit alarm    Mentation Interventions: Bed/chair exit alarm    Medication Interventions: Bed/chair exit alarm    Elimination Interventions: Bed/chair exit alarm    History of Falls Interventions: Bed/chair exit alarm 16-Sep-2017 18:00

## 2023-01-01 NOTE — ED PROVIDER NOTE - CONDUCTED A DETAILED DISCUSSION WITH PATIENT AND/OR GUARDIAN REGARDING, MDM
3 lab results/need for outpatient follow-up/return to ED if symptoms worsen, persist or questions arise/radiology results

## 2024-01-02 NOTE — H&P PST ADULT - HISTORY OF PRESENT ILLNESS
Mom called back states pharmacy doesn't know what they are talking about when asking if lot # is underlined.   Mom says they want to stay with the G6 until next appointment with Dr. Galan. Please send a new script for the G6 sensor and transmitters to the [facility] [place] in chart.    Pt complaining of decreased appetite 76yo female with PMH of Type 2 DM here for PST. Pt complaining of abdominal pain and decreased appetite for the last 2-3 months and was referred to GI. S/p MRI - mild intrahepatic biliary and prominent 1cm CBD with abrupt cut off in the periampullary region. Pt denies n/v/d and current abdominal pain today. Pt electing for ERCP on 1/9/18.

## 2024-01-08 ENCOUNTER — INPATIENT (INPATIENT)
Facility: HOSPITAL | Age: 84
LOS: 6 days | Discharge: HOME | DRG: 871 | End: 2024-01-15
Attending: INTERNAL MEDICINE | Admitting: INTERNAL MEDICINE
Payer: MEDICAID

## 2024-01-08 VITALS
RESPIRATION RATE: 16 BRPM | HEART RATE: 98 BPM | DIASTOLIC BLOOD PRESSURE: 58 MMHG | OXYGEN SATURATION: 94 % | SYSTOLIC BLOOD PRESSURE: 105 MMHG | WEIGHT: 119.93 LBS | HEIGHT: 60 IN | TEMPERATURE: 98 F

## 2024-01-08 DIAGNOSIS — Z98.890 OTHER SPECIFIED POSTPROCEDURAL STATES: Chronic | ICD-10-CM

## 2024-01-08 DIAGNOSIS — Z96.659 PRESENCE OF UNSPECIFIED ARTIFICIAL KNEE JOINT: Chronic | ICD-10-CM

## 2024-01-08 DIAGNOSIS — Z90.49 ACQUIRED ABSENCE OF OTHER SPECIFIED PARTS OF DIGESTIVE TRACT: Chronic | ICD-10-CM

## 2024-01-08 DIAGNOSIS — Z90.3 ACQUIRED ABSENCE OF STOMACH [PART OF]: Chronic | ICD-10-CM

## 2024-01-08 LAB
ALBUMIN SERPL ELPH-MCNC: 2.3 G/DL — LOW (ref 3.3–5)
ALBUMIN SERPL ELPH-MCNC: 2.3 G/DL — LOW (ref 3.3–5)
ALP SERPL-CCNC: 112 U/L — SIGNIFICANT CHANGE UP (ref 40–120)
ALP SERPL-CCNC: 112 U/L — SIGNIFICANT CHANGE UP (ref 40–120)
ALT FLD-CCNC: 16 U/L — SIGNIFICANT CHANGE UP (ref 12–78)
ALT FLD-CCNC: 16 U/L — SIGNIFICANT CHANGE UP (ref 12–78)
ANION GAP SERPL CALC-SCNC: 7 MMOL/L — SIGNIFICANT CHANGE UP (ref 5–17)
ANION GAP SERPL CALC-SCNC: 7 MMOL/L — SIGNIFICANT CHANGE UP (ref 5–17)
APTT BLD: 30.2 SEC — SIGNIFICANT CHANGE UP (ref 24.5–35.6)
APTT BLD: 30.2 SEC — SIGNIFICANT CHANGE UP (ref 24.5–35.6)
AST SERPL-CCNC: 15 U/L — SIGNIFICANT CHANGE UP (ref 15–37)
AST SERPL-CCNC: 15 U/L — SIGNIFICANT CHANGE UP (ref 15–37)
BASOPHILS # BLD AUTO: 0 K/UL — SIGNIFICANT CHANGE UP (ref 0–0.2)
BASOPHILS # BLD AUTO: 0 K/UL — SIGNIFICANT CHANGE UP (ref 0–0.2)
BASOPHILS NFR BLD AUTO: 0 % — SIGNIFICANT CHANGE UP (ref 0–2)
BASOPHILS NFR BLD AUTO: 0 % — SIGNIFICANT CHANGE UP (ref 0–2)
BILIRUB SERPL-MCNC: 0.3 MG/DL — SIGNIFICANT CHANGE UP (ref 0.2–1.2)
BILIRUB SERPL-MCNC: 0.3 MG/DL — SIGNIFICANT CHANGE UP (ref 0.2–1.2)
BUN SERPL-MCNC: 44 MG/DL — HIGH (ref 7–23)
BUN SERPL-MCNC: 44 MG/DL — HIGH (ref 7–23)
CALCIUM SERPL-MCNC: 8.5 MG/DL — SIGNIFICANT CHANGE UP (ref 8.5–10.1)
CALCIUM SERPL-MCNC: 8.5 MG/DL — SIGNIFICANT CHANGE UP (ref 8.5–10.1)
CHLORIDE SERPL-SCNC: 100 MMOL/L — SIGNIFICANT CHANGE UP (ref 96–108)
CHLORIDE SERPL-SCNC: 100 MMOL/L — SIGNIFICANT CHANGE UP (ref 96–108)
CO2 SERPL-SCNC: 25 MMOL/L — SIGNIFICANT CHANGE UP (ref 22–31)
CO2 SERPL-SCNC: 25 MMOL/L — SIGNIFICANT CHANGE UP (ref 22–31)
CREAT SERPL-MCNC: 1.6 MG/DL — HIGH (ref 0.5–1.3)
CREAT SERPL-MCNC: 1.6 MG/DL — HIGH (ref 0.5–1.3)
EGFR: 32 ML/MIN/1.73M2 — LOW
EGFR: 32 ML/MIN/1.73M2 — LOW
EOSINOPHIL # BLD AUTO: 0.16 K/UL — SIGNIFICANT CHANGE UP (ref 0–0.5)
EOSINOPHIL # BLD AUTO: 0.16 K/UL — SIGNIFICANT CHANGE UP (ref 0–0.5)
EOSINOPHIL NFR BLD AUTO: 1 % — SIGNIFICANT CHANGE UP (ref 0–6)
EOSINOPHIL NFR BLD AUTO: 1 % — SIGNIFICANT CHANGE UP (ref 0–6)
FLUAV AG NPH QL: SIGNIFICANT CHANGE UP
FLUAV AG NPH QL: SIGNIFICANT CHANGE UP
FLUBV AG NPH QL: SIGNIFICANT CHANGE UP
FLUBV AG NPH QL: SIGNIFICANT CHANGE UP
HCT VFR BLD CALC: 19.9 % — CRITICAL LOW (ref 34.5–45)
HCT VFR BLD CALC: 19.9 % — CRITICAL LOW (ref 34.5–45)
HGB BLD-MCNC: 6.8 G/DL — CRITICAL LOW (ref 11.5–15.5)
HGB BLD-MCNC: 6.8 G/DL — CRITICAL LOW (ref 11.5–15.5)
INR BLD: 1.06 RATIO — SIGNIFICANT CHANGE UP (ref 0.85–1.18)
INR BLD: 1.06 RATIO — SIGNIFICANT CHANGE UP (ref 0.85–1.18)
LACTATE SERPL-SCNC: 1.5 MMOL/L — SIGNIFICANT CHANGE UP (ref 0.7–2)
LACTATE SERPL-SCNC: 1.5 MMOL/L — SIGNIFICANT CHANGE UP (ref 0.7–2)
LYMPHOCYTES # BLD AUTO: 1.61 K/UL — SIGNIFICANT CHANGE UP (ref 1–3.3)
LYMPHOCYTES # BLD AUTO: 1.61 K/UL — SIGNIFICANT CHANGE UP (ref 1–3.3)
LYMPHOCYTES # BLD AUTO: 10 % — LOW (ref 13–44)
LYMPHOCYTES # BLD AUTO: 10 % — LOW (ref 13–44)
MANUAL SMEAR VERIFICATION: SIGNIFICANT CHANGE UP
MANUAL SMEAR VERIFICATION: SIGNIFICANT CHANGE UP
MCHC RBC-ENTMCNC: 29.6 PG — SIGNIFICANT CHANGE UP (ref 27–34)
MCHC RBC-ENTMCNC: 29.6 PG — SIGNIFICANT CHANGE UP (ref 27–34)
MCHC RBC-ENTMCNC: 34.2 GM/DL — SIGNIFICANT CHANGE UP (ref 32–36)
MCHC RBC-ENTMCNC: 34.2 GM/DL — SIGNIFICANT CHANGE UP (ref 32–36)
MCV RBC AUTO: 86.5 FL — SIGNIFICANT CHANGE UP (ref 80–100)
MCV RBC AUTO: 86.5 FL — SIGNIFICANT CHANGE UP (ref 80–100)
METAMYELOCYTES # FLD: 2 % — HIGH (ref 0–0)
METAMYELOCYTES # FLD: 2 % — HIGH (ref 0–0)
MONOCYTES # BLD AUTO: 1.61 K/UL — HIGH (ref 0–0.9)
MONOCYTES # BLD AUTO: 1.61 K/UL — HIGH (ref 0–0.9)
MONOCYTES NFR BLD AUTO: 10 % — SIGNIFICANT CHANGE UP (ref 2–14)
MONOCYTES NFR BLD AUTO: 10 % — SIGNIFICANT CHANGE UP (ref 2–14)
MYELOCYTES NFR BLD: 5 % — HIGH (ref 0–0)
MYELOCYTES NFR BLD: 5 % — HIGH (ref 0–0)
NEUTROPHILS # BLD AUTO: 11.59 K/UL — HIGH (ref 1.8–7.4)
NEUTROPHILS # BLD AUTO: 11.59 K/UL — HIGH (ref 1.8–7.4)
NEUTROPHILS NFR BLD AUTO: 68 % — SIGNIFICANT CHANGE UP (ref 43–77)
NEUTROPHILS NFR BLD AUTO: 68 % — SIGNIFICANT CHANGE UP (ref 43–77)
NEUTS BAND # BLD: 4 % — SIGNIFICANT CHANGE UP (ref 0–8)
NEUTS BAND # BLD: 4 % — SIGNIFICANT CHANGE UP (ref 0–8)
NRBC # BLD: 0 /100 WBCS — SIGNIFICANT CHANGE UP (ref 0–0)
NRBC # BLD: 0 /100 WBCS — SIGNIFICANT CHANGE UP (ref 0–0)
NRBC # BLD: SIGNIFICANT CHANGE UP /100 WBCS (ref 0–0)
NRBC # BLD: SIGNIFICANT CHANGE UP /100 WBCS (ref 0–0)
OB PNL STL: NEGATIVE — SIGNIFICANT CHANGE UP
OB PNL STL: NEGATIVE — SIGNIFICANT CHANGE UP
OVALOCYTES BLD QL SMEAR: SLIGHT — SIGNIFICANT CHANGE UP
OVALOCYTES BLD QL SMEAR: SLIGHT — SIGNIFICANT CHANGE UP
PLAT MORPH BLD: NORMAL — SIGNIFICANT CHANGE UP
PLAT MORPH BLD: NORMAL — SIGNIFICANT CHANGE UP
PLATELET # BLD AUTO: 246 K/UL — SIGNIFICANT CHANGE UP (ref 150–400)
PLATELET # BLD AUTO: 246 K/UL — SIGNIFICANT CHANGE UP (ref 150–400)
PLATELET COUNT - ESTIMATE: NORMAL — SIGNIFICANT CHANGE UP
PLATELET COUNT - ESTIMATE: NORMAL — SIGNIFICANT CHANGE UP
POTASSIUM SERPL-MCNC: 4.9 MMOL/L — SIGNIFICANT CHANGE UP (ref 3.5–5.3)
POTASSIUM SERPL-MCNC: 4.9 MMOL/L — SIGNIFICANT CHANGE UP (ref 3.5–5.3)
POTASSIUM SERPL-SCNC: 4.9 MMOL/L — SIGNIFICANT CHANGE UP (ref 3.5–5.3)
POTASSIUM SERPL-SCNC: 4.9 MMOL/L — SIGNIFICANT CHANGE UP (ref 3.5–5.3)
PROT SERPL-MCNC: 6.9 G/DL — SIGNIFICANT CHANGE UP (ref 6–8.3)
PROT SERPL-MCNC: 6.9 G/DL — SIGNIFICANT CHANGE UP (ref 6–8.3)
PROTHROM AB SERPL-ACNC: 12.4 SEC — SIGNIFICANT CHANGE UP (ref 9.5–13)
PROTHROM AB SERPL-ACNC: 12.4 SEC — SIGNIFICANT CHANGE UP (ref 9.5–13)
RBC # BLD: 2.3 M/UL — LOW (ref 3.8–5.2)
RBC # BLD: 2.3 M/UL — LOW (ref 3.8–5.2)
RBC # FLD: 14.2 % — SIGNIFICANT CHANGE UP (ref 10.3–14.5)
RBC # FLD: 14.2 % — SIGNIFICANT CHANGE UP (ref 10.3–14.5)
RBC BLD AUTO: SIGNIFICANT CHANGE UP
RBC BLD AUTO: SIGNIFICANT CHANGE UP
RSV RNA NPH QL NAA+NON-PROBE: SIGNIFICANT CHANGE UP
RSV RNA NPH QL NAA+NON-PROBE: SIGNIFICANT CHANGE UP
SARS-COV-2 RNA SPEC QL NAA+PROBE: SIGNIFICANT CHANGE UP
SARS-COV-2 RNA SPEC QL NAA+PROBE: SIGNIFICANT CHANGE UP
SMUDGE CELLS # BLD: PRESENT — SIGNIFICANT CHANGE UP
SMUDGE CELLS # BLD: PRESENT — SIGNIFICANT CHANGE UP
SODIUM SERPL-SCNC: 132 MMOL/L — LOW (ref 135–145)
SODIUM SERPL-SCNC: 132 MMOL/L — LOW (ref 135–145)
WBC # BLD: 16.1 K/UL — HIGH (ref 3.8–10.5)
WBC # BLD: 16.1 K/UL — HIGH (ref 3.8–10.5)
WBC # FLD AUTO: 16.1 K/UL — HIGH (ref 3.8–10.5)
WBC # FLD AUTO: 16.1 K/UL — HIGH (ref 3.8–10.5)

## 2024-01-08 PROCEDURE — 71045 X-RAY EXAM CHEST 1 VIEW: CPT | Mod: 26

## 2024-01-08 PROCEDURE — 99285 EMERGENCY DEPT VISIT HI MDM: CPT

## 2024-01-08 PROCEDURE — 74177 CT ABD & PELVIS W/CONTRAST: CPT | Mod: 26,MA

## 2024-01-08 PROCEDURE — 71260 CT THORAX DX C+: CPT | Mod: 26,MA

## 2024-01-08 RX ORDER — SODIUM CHLORIDE 9 MG/ML
1000 INJECTION INTRAMUSCULAR; INTRAVENOUS; SUBCUTANEOUS ONCE
Refills: 0 | Status: COMPLETED | OUTPATIENT
Start: 2024-01-08 | End: 2024-01-08

## 2024-01-08 RX ORDER — CEFTRIAXONE 500 MG/1
1000 INJECTION, POWDER, FOR SOLUTION INTRAMUSCULAR; INTRAVENOUS ONCE
Refills: 0 | Status: COMPLETED | OUTPATIENT
Start: 2024-01-08 | End: 2024-01-08

## 2024-01-08 RX ORDER — AZITHROMYCIN 500 MG/1
500 TABLET, FILM COATED ORAL ONCE
Refills: 0 | Status: COMPLETED | OUTPATIENT
Start: 2024-01-08 | End: 2024-01-09

## 2024-01-08 RX ADMIN — CEFTRIAXONE 100 MILLIGRAM(S): 500 INJECTION, POWDER, FOR SOLUTION INTRAMUSCULAR; INTRAVENOUS at 23:44

## 2024-01-08 RX ADMIN — SODIUM CHLORIDE 1000 MILLILITER(S): 9 INJECTION INTRAMUSCULAR; INTRAVENOUS; SUBCUTANEOUS at 23:44

## 2024-01-08 NOTE — ED ADULT NURSE NOTE - NSFALLRISKINTERV_ED_ALL_ED
Assistance OOB with selected safe patient handling equipment if applicable/Assistance with ambulation/Communicate fall risk and risk factors to all staff, patient, and family/Provide visual cue: yellow wristband, yellow gown, etc/Reinforce activity limits and safety measures with patient and family/Call bell, personal items and telephone in reach/Instruct patient to call for assistance before getting out of bed/chair/stretcher/Non-slip footwear applied when patient is off stretcher/[place] to call system/Physically safe environment - no spills, clutter or unnecessary equipment/Purposeful Proactive Rounding/Room/bathroom lighting operational, light cord in reach Assistance OOB with selected safe patient handling equipment if applicable/Assistance with ambulation/Communicate fall risk and risk factors to all staff, patient, and family/Provide visual cue: yellow wristband, yellow gown, etc/Reinforce activity limits and safety measures with patient and family/Call bell, personal items and telephone in reach/Instruct patient to call for assistance before getting out of bed/chair/stretcher/Non-slip footwear applied when patient is off stretcher/[place] to call system/Physically safe environment - no spills, clutter or unnecessary equipment/Purposeful Proactive Rounding/Room/bathroom lighting operational, light cord in reach

## 2024-01-08 NOTE — ED PROVIDER NOTE - NSICDXPASTMEDICALHX_GEN_ALL_CORE_FT
PAST MEDICAL HISTORY:  Anemia     Anxiety     Anxiety and depression     Chronic joint pain     Diabetes mellitus, type 2     GERD (gastroesophageal reflux disease)     OA (osteoarthritis) of knee (Right knee)    Other chronic pancreatitis     Seasonal allergies     Type 2 diabetes mellitus

## 2024-01-08 NOTE — ED PROVIDER NOTE - NSICDXPASTSURGICALHX_GEN_ALL_CORE_FT
PAST SURGICAL HISTORY:  History of back surgery (2007)    S/P arthroscopic knee surgery (Right knee, 2007)    S/P cholecystectomy (2002)    S/P knee replacement     S/P subtotal gastrectomy (with gastrojejunal anastomosis, 30 years ago)

## 2024-01-08 NOTE — ED PROVIDER NOTE - OBJECTIVE STATEMENT
83-year-old female PMH of DM2, anxiety/depression, chronic joint and back pain, osteoarthritis, GERD, anemia, exsmoker presents to the emergency department with son sent by urgent care by ambulance with report of pneumonia.  Patient states for the past week patient has had dry cough, generalized weakness, no appetite, at urgent care patient flu/COVID was negative, concern for patient having 101 fever, low blood pressure, and right sided lung infiltrate. 83-year-old female PMH of DM2, anxiety/depression, chronic joint and back pain, osteoarthritis, GERD, anemia, exsmoker dementia, presents to the emergency department with son sent by urgent care by ambulance with report of pneumonia.  Patient states for the past week patient has had dry cough, generalized weakness, no appetite, at urgent care patient flu/COVID was negative, concern for patient having 101 fever, low blood pressure, and right sided lung infiltrate.

## 2024-01-08 NOTE — ED ADULT TRIAGE NOTE - CHIEF COMPLAINT QUOTE
Patient brought by ambulance from home as reported complaining of cough, fever x 6 days diagnosed to have PNA, non compliant with medications

## 2024-01-08 NOTE — ED PROVIDER NOTE - CLINICAL SUMMARY MEDICAL DECISION MAKING FREE TEXT BOX
83-year-old female with weakness, cough, follow-up chest x-ray, rectal temperature, send CBC, CMP, blood cultures, urine analysis, urine culture, start IV fluids, antibiotics and admit.

## 2024-01-09 DIAGNOSIS — E11.9 TYPE 2 DIABETES MELLITUS WITHOUT COMPLICATIONS: ICD-10-CM

## 2024-01-09 DIAGNOSIS — J18.9 PNEUMONIA, UNSPECIFIED ORGANISM: ICD-10-CM

## 2024-01-09 LAB
ALBUMIN SERPL ELPH-MCNC: 2.3 G/DL — LOW (ref 3.3–5)
ALBUMIN SERPL ELPH-MCNC: 2.3 G/DL — LOW (ref 3.3–5)
ALLERGY+IMMUNOLOGY DIAG STUDY NOTE: SIGNIFICANT CHANGE UP
ALLERGY+IMMUNOLOGY DIAG STUDY NOTE: SIGNIFICANT CHANGE UP
ALP SERPL-CCNC: 114 U/L — SIGNIFICANT CHANGE UP (ref 40–120)
ALP SERPL-CCNC: 114 U/L — SIGNIFICANT CHANGE UP (ref 40–120)
ALT FLD-CCNC: 16 U/L — SIGNIFICANT CHANGE UP (ref 12–78)
ALT FLD-CCNC: 16 U/L — SIGNIFICANT CHANGE UP (ref 12–78)
ANION GAP SERPL CALC-SCNC: 6 MMOL/L — SIGNIFICANT CHANGE UP (ref 5–17)
ANION GAP SERPL CALC-SCNC: 6 MMOL/L — SIGNIFICANT CHANGE UP (ref 5–17)
AST SERPL-CCNC: 28 U/L — SIGNIFICANT CHANGE UP (ref 15–37)
AST SERPL-CCNC: 28 U/L — SIGNIFICANT CHANGE UP (ref 15–37)
B-OH-BUTYR SERPL-SCNC: 0.5 MMOL/L — HIGH
B-OH-BUTYR SERPL-SCNC: 0.5 MMOL/L — HIGH
BASE EXCESS BLDV CALC-SCNC: -1 MMOL/L — SIGNIFICANT CHANGE UP (ref -2–3)
BASE EXCESS BLDV CALC-SCNC: -1 MMOL/L — SIGNIFICANT CHANGE UP (ref -2–3)
BILIRUB SERPL-MCNC: 0.5 MG/DL — SIGNIFICANT CHANGE UP (ref 0.2–1.2)
BILIRUB SERPL-MCNC: 0.5 MG/DL — SIGNIFICANT CHANGE UP (ref 0.2–1.2)
BLD GP AB SCN SERPL QL: SIGNIFICANT CHANGE UP
BLD GP AB SCN SERPL QL: SIGNIFICANT CHANGE UP
BLOOD GAS COMMENTS, VENOUS: SIGNIFICANT CHANGE UP
BLOOD GAS COMMENTS, VENOUS: SIGNIFICANT CHANGE UP
BUN SERPL-MCNC: 28 MG/DL — HIGH (ref 7–23)
BUN SERPL-MCNC: 28 MG/DL — HIGH (ref 7–23)
CALCIUM SERPL-MCNC: 8.3 MG/DL — LOW (ref 8.5–10.1)
CALCIUM SERPL-MCNC: 8.3 MG/DL — LOW (ref 8.5–10.1)
CHLORIDE SERPL-SCNC: 106 MMOL/L — SIGNIFICANT CHANGE UP (ref 96–108)
CHLORIDE SERPL-SCNC: 106 MMOL/L — SIGNIFICANT CHANGE UP (ref 96–108)
CO2 SERPL-SCNC: 25 MMOL/L — SIGNIFICANT CHANGE UP (ref 22–31)
CO2 SERPL-SCNC: 25 MMOL/L — SIGNIFICANT CHANGE UP (ref 22–31)
CREAT SERPL-MCNC: 1 MG/DL — SIGNIFICANT CHANGE UP (ref 0.5–1.3)
CREAT SERPL-MCNC: 1 MG/DL — SIGNIFICANT CHANGE UP (ref 0.5–1.3)
CRP SERPL-MCNC: 192 MG/L — HIGH
CRP SERPL-MCNC: 192 MG/L — HIGH
DIR ANTIGLOB POLYSPECIFIC INTERPRETATION: SIGNIFICANT CHANGE UP
DIR ANTIGLOB POLYSPECIFIC INTERPRETATION: SIGNIFICANT CHANGE UP
EGFR: 56 ML/MIN/1.73M2 — LOW
EGFR: 56 ML/MIN/1.73M2 — LOW
GAS PNL BLDV: SIGNIFICANT CHANGE UP
GAS PNL BLDV: SIGNIFICANT CHANGE UP
GLUCOSE SERPL-MCNC: 203 MG/DL — HIGH (ref 70–99)
GLUCOSE SERPL-MCNC: 203 MG/DL — HIGH (ref 70–99)
GLUCOSE SERPL-MCNC: 624 MG/DL — CRITICAL HIGH (ref 70–99)
GLUCOSE SERPL-MCNC: 624 MG/DL — CRITICAL HIGH (ref 70–99)
HCO3 BLDV-SCNC: 24 MMOL/L — SIGNIFICANT CHANGE UP (ref 22–29)
HCO3 BLDV-SCNC: 24 MMOL/L — SIGNIFICANT CHANGE UP (ref 22–29)
HCT VFR BLD CALC: 24.7 % — LOW (ref 34.5–45)
HCT VFR BLD CALC: 24.7 % — LOW (ref 34.5–45)
HGB BLD-MCNC: 8.3 G/DL — LOW (ref 11.5–15.5)
HGB BLD-MCNC: 8.3 G/DL — LOW (ref 11.5–15.5)
LACTATE SERPL-SCNC: 1.2 MMOL/L — SIGNIFICANT CHANGE UP (ref 0.7–2)
LACTATE SERPL-SCNC: 1.2 MMOL/L — SIGNIFICANT CHANGE UP (ref 0.7–2)
MCHC RBC-ENTMCNC: 29.2 PG — SIGNIFICANT CHANGE UP (ref 27–34)
MCHC RBC-ENTMCNC: 29.2 PG — SIGNIFICANT CHANGE UP (ref 27–34)
MCHC RBC-ENTMCNC: 33.6 GM/DL — SIGNIFICANT CHANGE UP (ref 32–36)
MCHC RBC-ENTMCNC: 33.6 GM/DL — SIGNIFICANT CHANGE UP (ref 32–36)
MCV RBC AUTO: 87 FL — SIGNIFICANT CHANGE UP (ref 80–100)
MCV RBC AUTO: 87 FL — SIGNIFICANT CHANGE UP (ref 80–100)
NRBC # BLD: 0 /100 WBCS — SIGNIFICANT CHANGE UP (ref 0–0)
NRBC # BLD: 0 /100 WBCS — SIGNIFICANT CHANGE UP (ref 0–0)
PCO2 BLDV: 42 MMHG — SIGNIFICANT CHANGE UP (ref 39–42)
PCO2 BLDV: 42 MMHG — SIGNIFICANT CHANGE UP (ref 39–42)
PH BLDV: 7.37 — SIGNIFICANT CHANGE UP (ref 7.32–7.43)
PH BLDV: 7.37 — SIGNIFICANT CHANGE UP (ref 7.32–7.43)
PLATELET # BLD AUTO: 224 K/UL — SIGNIFICANT CHANGE UP (ref 150–400)
PLATELET # BLD AUTO: 224 K/UL — SIGNIFICANT CHANGE UP (ref 150–400)
PO2 BLDV: 182 MMHG — HIGH (ref 25–45)
PO2 BLDV: 182 MMHG — HIGH (ref 25–45)
POTASSIUM SERPL-MCNC: 4.5 MMOL/L — SIGNIFICANT CHANGE UP (ref 3.5–5.3)
POTASSIUM SERPL-MCNC: 4.5 MMOL/L — SIGNIFICANT CHANGE UP (ref 3.5–5.3)
POTASSIUM SERPL-SCNC: 4.5 MMOL/L — SIGNIFICANT CHANGE UP (ref 3.5–5.3)
POTASSIUM SERPL-SCNC: 4.5 MMOL/L — SIGNIFICANT CHANGE UP (ref 3.5–5.3)
PROT SERPL-MCNC: 6.9 G/DL — SIGNIFICANT CHANGE UP (ref 6–8.3)
PROT SERPL-MCNC: 6.9 G/DL — SIGNIFICANT CHANGE UP (ref 6–8.3)
RAPID RVP RESULT: SIGNIFICANT CHANGE UP
RAPID RVP RESULT: SIGNIFICANT CHANGE UP
RBC # BLD: 2.84 M/UL — LOW (ref 3.8–5.2)
RBC # BLD: 2.84 M/UL — LOW (ref 3.8–5.2)
RBC # FLD: 13.8 % — SIGNIFICANT CHANGE UP (ref 10.3–14.5)
RBC # FLD: 13.8 % — SIGNIFICANT CHANGE UP (ref 10.3–14.5)
SAO2 % BLDV: 99.3 % — HIGH (ref 67–88)
SAO2 % BLDV: 99.3 % — HIGH (ref 67–88)
SARS-COV-2 RNA SPEC QL NAA+PROBE: SIGNIFICANT CHANGE UP
SARS-COV-2 RNA SPEC QL NAA+PROBE: SIGNIFICANT CHANGE UP
SODIUM SERPL-SCNC: 137 MMOL/L — SIGNIFICANT CHANGE UP (ref 135–145)
SODIUM SERPL-SCNC: 137 MMOL/L — SIGNIFICANT CHANGE UP (ref 135–145)
TSH SERPL-MCNC: 2.4 UIU/ML — SIGNIFICANT CHANGE UP (ref 0.36–3.74)
TSH SERPL-MCNC: 2.4 UIU/ML — SIGNIFICANT CHANGE UP (ref 0.36–3.74)
WBC # BLD: 16.34 K/UL — HIGH (ref 3.8–10.5)
WBC # BLD: 16.34 K/UL — HIGH (ref 3.8–10.5)
WBC # FLD AUTO: 16.34 K/UL — HIGH (ref 3.8–10.5)
WBC # FLD AUTO: 16.34 K/UL — HIGH (ref 3.8–10.5)

## 2024-01-09 PROCEDURE — 99254 IP/OBS CNSLTJ NEW/EST MOD 60: CPT

## 2024-01-09 PROCEDURE — 86077 PHYS BLOOD BANK SERV XMATCH: CPT

## 2024-01-09 PROCEDURE — 93010 ELECTROCARDIOGRAM REPORT: CPT

## 2024-01-09 RX ORDER — PIPERACILLIN AND TAZOBACTAM 4; .5 G/20ML; G/20ML
3.38 INJECTION, POWDER, LYOPHILIZED, FOR SOLUTION INTRAVENOUS ONCE
Refills: 0 | Status: DISCONTINUED | OUTPATIENT
Start: 2024-01-09 | End: 2024-01-09

## 2024-01-09 RX ORDER — IPRATROPIUM/ALBUTEROL SULFATE 18-103MCG
3 AEROSOL WITH ADAPTER (GRAM) INHALATION EVERY 6 HOURS
Refills: 0 | Status: DISCONTINUED | OUTPATIENT
Start: 2024-01-09 | End: 2024-01-15

## 2024-01-09 RX ORDER — MIRTAZAPINE 45 MG/1
1 TABLET, ORALLY DISINTEGRATING ORAL
Refills: 0 | DISCHARGE

## 2024-01-09 RX ORDER — AZITHROMYCIN 500 MG/1
500 TABLET, FILM COATED ORAL EVERY 24 HOURS
Refills: 0 | Status: DISCONTINUED | OUTPATIENT
Start: 2024-01-09 | End: 2024-01-09

## 2024-01-09 RX ORDER — CYPROHEPTADINE HYDROCHLORIDE 4 MG/1
0 TABLET ORAL
Qty: 0 | Refills: 0 | DISCHARGE

## 2024-01-09 RX ORDER — OLANZAPINE 15 MG/1
2.5 TABLET, FILM COATED ORAL ONCE
Refills: 0 | Status: COMPLETED | OUTPATIENT
Start: 2024-01-09 | End: 2024-01-09

## 2024-01-09 RX ORDER — SERTRALINE 25 MG/1
0 TABLET, FILM COATED ORAL
Qty: 0 | Refills: 0 | DISCHARGE

## 2024-01-09 RX ORDER — INSULIN GLARGINE 100 [IU]/ML
12 INJECTION, SOLUTION SUBCUTANEOUS EVERY MORNING
Refills: 0 | Status: DISCONTINUED | OUTPATIENT
Start: 2024-01-09 | End: 2024-01-15

## 2024-01-09 RX ORDER — INSULIN LISPRO 100/ML
VIAL (ML) SUBCUTANEOUS EVERY 6 HOURS
Refills: 0 | Status: DISCONTINUED | OUTPATIENT
Start: 2024-01-09 | End: 2024-01-09

## 2024-01-09 RX ORDER — IBUPROFEN 200 MG
1 TABLET ORAL
Qty: 0 | Refills: 0 | DISCHARGE

## 2024-01-09 RX ORDER — HEPARIN SODIUM 5000 [USP'U]/ML
5000 INJECTION INTRAVENOUS; SUBCUTANEOUS EVERY 8 HOURS
Refills: 0 | Status: DISCONTINUED | OUTPATIENT
Start: 2024-01-09 | End: 2024-01-15

## 2024-01-09 RX ORDER — ACETAMINOPHEN 500 MG
650 TABLET ORAL EVERY 6 HOURS
Refills: 0 | Status: DISCONTINUED | OUTPATIENT
Start: 2024-01-09 | End: 2024-01-15

## 2024-01-09 RX ORDER — LORATADINE 10 MG/1
1 TABLET ORAL
Qty: 0 | Refills: 0 | DISCHARGE

## 2024-01-09 RX ORDER — INSULIN LISPRO 100/ML
VIAL (ML) SUBCUTANEOUS
Refills: 0 | Status: DISCONTINUED | OUTPATIENT
Start: 2024-01-09 | End: 2024-01-14

## 2024-01-09 RX ORDER — INSULIN LISPRO 100/ML
VIAL (ML) SUBCUTANEOUS AT BEDTIME
Refills: 0 | Status: DISCONTINUED | OUTPATIENT
Start: 2024-01-09 | End: 2024-01-14

## 2024-01-09 RX ORDER — GLUCAGON INJECTION, SOLUTION 0.5 MG/.1ML
1 INJECTION, SOLUTION SUBCUTANEOUS ONCE
Refills: 0 | Status: DISCONTINUED | OUTPATIENT
Start: 2024-01-09 | End: 2024-01-15

## 2024-01-09 RX ORDER — QUETIAPINE FUMARATE 200 MG/1
1 TABLET, FILM COATED ORAL
Refills: 0 | DISCHARGE

## 2024-01-09 RX ORDER — INSULIN LISPRO 100/ML
4 VIAL (ML) SUBCUTANEOUS
Refills: 0 | Status: DISCONTINUED | OUTPATIENT
Start: 2024-01-09 | End: 2024-01-15

## 2024-01-09 RX ORDER — DEXTROSE 50 % IN WATER 50 %
25 SYRINGE (ML) INTRAVENOUS ONCE
Refills: 0 | Status: DISCONTINUED | OUTPATIENT
Start: 2024-01-09 | End: 2024-01-15

## 2024-01-09 RX ORDER — DEXTROSE 50 % IN WATER 50 %
12.5 SYRINGE (ML) INTRAVENOUS ONCE
Refills: 0 | Status: DISCONTINUED | OUTPATIENT
Start: 2024-01-09 | End: 2024-01-15

## 2024-01-09 RX ORDER — MIRTAZAPINE 45 MG/1
15 TABLET, ORALLY DISINTEGRATING ORAL AT BEDTIME
Refills: 0 | Status: DISCONTINUED | OUTPATIENT
Start: 2024-01-09 | End: 2024-01-13

## 2024-01-09 RX ORDER — CLONAZEPAM 1 MG
0 TABLET ORAL
Qty: 0 | Refills: 0 | DISCHARGE

## 2024-01-09 RX ORDER — SODIUM CHLORIDE 9 MG/ML
1000 INJECTION, SOLUTION INTRAVENOUS
Refills: 0 | Status: DISCONTINUED | OUTPATIENT
Start: 2024-01-09 | End: 2024-01-15

## 2024-01-09 RX ORDER — QUETIAPINE FUMARATE 200 MG/1
25 TABLET, FILM COATED ORAL AT BEDTIME
Refills: 0 | Status: DISCONTINUED | OUTPATIENT
Start: 2024-01-09 | End: 2024-01-15

## 2024-01-09 RX ORDER — PIPERACILLIN AND TAZOBACTAM 4; .5 G/20ML; G/20ML
3.38 INJECTION, POWDER, LYOPHILIZED, FOR SOLUTION INTRAVENOUS ONCE
Refills: 0 | Status: COMPLETED | OUTPATIENT
Start: 2024-01-09 | End: 2024-01-09

## 2024-01-09 RX ORDER — OMEPRAZOLE 10 MG/1
1 CAPSULE, DELAYED RELEASE ORAL
Qty: 0 | Refills: 0 | DISCHARGE

## 2024-01-09 RX ORDER — PIPERACILLIN AND TAZOBACTAM 4; .5 G/20ML; G/20ML
3.38 INJECTION, POWDER, LYOPHILIZED, FOR SOLUTION INTRAVENOUS EVERY 8 HOURS
Refills: 0 | Status: DISCONTINUED | OUTPATIENT
Start: 2024-01-09 | End: 2024-01-10

## 2024-01-09 RX ORDER — INFLUENZA VIRUS VACCINE 15; 15; 15; 15 UG/.5ML; UG/.5ML; UG/.5ML; UG/.5ML
0.7 SUSPENSION INTRAMUSCULAR ONCE
Refills: 0 | Status: DISCONTINUED | OUTPATIENT
Start: 2024-01-09 | End: 2024-01-15

## 2024-01-09 RX ORDER — ASPIRIN/CALCIUM CARB/MAGNESIUM 324 MG
1 TABLET ORAL
Qty: 0 | Refills: 0 | DISCHARGE

## 2024-01-09 RX ORDER — INSULIN LISPRO 100/ML
10 VIAL (ML) SUBCUTANEOUS ONCE
Refills: 0 | Status: COMPLETED | OUTPATIENT
Start: 2024-01-09 | End: 2024-01-09

## 2024-01-09 RX ORDER — FERROUS GLUCONATE 100 %
0 POWDER (GRAM) MISCELLANEOUS
Qty: 0 | Refills: 0 | DISCHARGE

## 2024-01-09 RX ORDER — ESCITALOPRAM OXALATE 10 MG/1
1 TABLET, FILM COATED ORAL
Qty: 0 | Refills: 0 | DISCHARGE

## 2024-01-09 RX ORDER — ATORVASTATIN CALCIUM 80 MG/1
1 TABLET, FILM COATED ORAL
Qty: 0 | Refills: 0 | DISCHARGE

## 2024-01-09 RX ORDER — OLANZAPINE 15 MG/1
5 TABLET, FILM COATED ORAL ONCE
Refills: 0 | Status: COMPLETED | OUTPATIENT
Start: 2024-01-09 | End: 2024-01-09

## 2024-01-09 RX ORDER — ACETAMINOPHEN 500 MG
2 TABLET ORAL
Qty: 0 | Refills: 0 | DISCHARGE

## 2024-01-09 RX ORDER — CEFTRIAXONE 500 MG/1
1000 INJECTION, POWDER, FOR SOLUTION INTRAMUSCULAR; INTRAVENOUS EVERY 24 HOURS
Refills: 0 | Status: DISCONTINUED | OUTPATIENT
Start: 2024-01-09 | End: 2024-01-09

## 2024-01-09 RX ORDER — METFORMIN HYDROCHLORIDE 850 MG/1
0 TABLET ORAL
Qty: 0 | Refills: 0 | DISCHARGE

## 2024-01-09 RX ORDER — SODIUM CHLORIDE 9 MG/ML
1000 INJECTION INTRAMUSCULAR; INTRAVENOUS; SUBCUTANEOUS
Refills: 0 | Status: DISCONTINUED | OUTPATIENT
Start: 2024-01-09 | End: 2024-01-14

## 2024-01-09 RX ORDER — DEXTROSE 50 % IN WATER 50 %
15 SYRINGE (ML) INTRAVENOUS ONCE
Refills: 0 | Status: DISCONTINUED | OUTPATIENT
Start: 2024-01-09 | End: 2024-01-15

## 2024-01-09 RX ORDER — METFORMIN HYDROCHLORIDE 850 MG/1
2 TABLET ORAL
Qty: 0 | Refills: 0 | DISCHARGE

## 2024-01-09 RX ADMIN — HEPARIN SODIUM 5000 UNIT(S): 5000 INJECTION INTRAVENOUS; SUBCUTANEOUS at 12:10

## 2024-01-09 RX ADMIN — Medication 10 UNIT(S): at 02:02

## 2024-01-09 RX ADMIN — AZITHROMYCIN 255 MILLIGRAM(S): 500 TABLET, FILM COATED ORAL at 01:27

## 2024-01-09 RX ADMIN — INSULIN GLARGINE 12 UNIT(S): 100 INJECTION, SOLUTION SUBCUTANEOUS at 09:39

## 2024-01-09 RX ADMIN — Medication 2: at 10:51

## 2024-01-09 RX ADMIN — HEPARIN SODIUM 5000 UNIT(S): 5000 INJECTION INTRAVENOUS; SUBCUTANEOUS at 21:44

## 2024-01-09 RX ADMIN — Medication 4 UNIT(S): at 10:51

## 2024-01-09 RX ADMIN — OLANZAPINE 5 MILLIGRAM(S): 15 TABLET, FILM COATED ORAL at 01:27

## 2024-01-09 RX ADMIN — Medication 3 MILLILITER(S): at 20:03

## 2024-01-09 RX ADMIN — Medication 4 UNIT(S): at 16:26

## 2024-01-09 RX ADMIN — PIPERACILLIN AND TAZOBACTAM 200 GRAM(S): 4; .5 INJECTION, POWDER, LYOPHILIZED, FOR SOLUTION INTRAVENOUS at 12:11

## 2024-01-09 RX ADMIN — Medication 2: at 16:26

## 2024-01-09 RX ADMIN — Medication 2: at 06:32

## 2024-01-09 RX ADMIN — MIRTAZAPINE 15 MILLIGRAM(S): 45 TABLET, ORALLY DISINTEGRATING ORAL at 22:30

## 2024-01-09 RX ADMIN — QUETIAPINE FUMARATE 25 MILLIGRAM(S): 200 TABLET, FILM COATED ORAL at 21:47

## 2024-01-09 RX ADMIN — OLANZAPINE 2.5 MILLIGRAM(S): 15 TABLET, FILM COATED ORAL at 22:44

## 2024-01-09 NOTE — CONSULT NOTE ADULT - ASSESSMENT
anemia  pnuemonia    reg diet  no overt GI bleed   occult negative  cbc daily  transfuse as needed   will follow

## 2024-01-09 NOTE — PATIENT PROFILE ADULT - FALL HARM RISK - HARM RISK INTERVENTIONS
Assistance with ambulation/Assistance OOB with selected safe patient handling equipment/Communicate Risk of Fall with Harm to all staff/Discuss with provider need for PT consult/Monitor gait and stability/Reinforce activity limits and safety measures with patient and family/Tailored Fall Risk Interventions/Visual Cue: Yellow wristband and red socks/Bed in lowest position, wheels locked, appropriate side rails in place/Call bell, personal items and telephone in reach/Instruct patient to call for assistance before getting out of bed or chair/Non-slip footwear when patient is out of bed/[place] to call system/Physically safe environment - no spills, clutter or unnecessary equipment/Purposeful Proactive Rounding/Room/bathroom lighting operational, light cord in reach Assistance with ambulation/Assistance OOB with selected safe patient handling equipment/Communicate Risk of Fall with Harm to all staff/Discuss with provider need for PT consult/Monitor gait and stability/Reinforce activity limits and safety measures with patient and family/Tailored Fall Risk Interventions/Visual Cue: Yellow wristband and red socks/Bed in lowest position, wheels locked, appropriate side rails in place/Call bell, personal items and telephone in reach/Instruct patient to call for assistance before getting out of bed or chair/Non-slip footwear when patient is out of bed/[place] to call system/Physically safe environment - no spills, clutter or unnecessary equipment/Purposeful Proactive Rounding/Room/bathroom lighting operational, light cord in reach

## 2024-01-09 NOTE — H&P ADULT - NSHPLABSRESULTS_GEN_ALL_CORE
Lab Results:  CBC  CBC Full  -  ( 08 Jan 2024 22:05 )  WBC Count : 16.10 K/uL  RBC Count : 2.30 M/uL  Hemoglobin : 6.8 g/dL  Hematocrit : 19.9 %  Platelet Count - Automated : 246 K/uL  Mean Cell Volume : 86.5 fl  Mean Cell Hemoglobin : 29.6 pg  Mean Cell Hemoglobin Concentration : 34.2 gm/dL  Auto Neutrophil # : 11.59 K/uL  Auto Lymphocyte # : 1.61 K/uL  Auto Monocyte # : 1.61 K/uL  Auto Eosinophil # : 0.16 K/uL  Auto Basophil # : 0.00 K/uL  Auto Neutrophil % : 68.0 %  Auto Lymphocyte % : 10.0 %  Auto Monocyte % : 10.0 %  Auto Eosinophil % : 1.0 %  Auto Basophil % : 0.0 %    .		Differential:	[] Automated		[] Manual  Chemistry                        6.8    16.10 )-----------( 246      ( 08 Jan 2024 22:05 )             19.9     01-08    132<L>  |  100  |  44<H>  ----------------------------<  624<HH>  4.9   |  25  |  1.60<H>    Ca    8.5      08 Jan 2024 22:05    TPro  6.9  /  Alb  2.3<L>  /  TBili  0.3  /  DBili  x   /  AST  15  /  ALT  16  /  AlkPhos  112  01-08    LIVER FUNCTIONS - ( 08 Jan 2024 22:05 )  Alb: 2.3 g/dL / Pro: 6.9 g/dL / ALK PHOS: 112 U/L / ALT: 16 U/L / AST: 15 U/L / GGT: x           PT/INR - ( 08 Jan 2024 22:05 )   PT: 12.4 sec;   INR: 1.06 ratio         PTT - ( 08 Jan 2024 22:05 )  PTT:30.2 sec  Urinalysis Basic - ( 08 Jan 2024 22:05 )    Color: x / Appearance: x / SG: x / pH: x  Gluc: 624 mg/dL / Ketone: x  / Bili: x / Urobili: x   Blood: x / Protein: x / Nitrite: x   Leuk Esterase: x / RBC: x / WBC x   Sq Epi: x / Non Sq Epi: x / Bacteria: x            MICROBIOLOGY/CULTURES:      RADIOLOGY RESULTS: reviewed

## 2024-01-09 NOTE — CARE COORDINATION ASSESSMENT. - NSPASTMEDSURGHISTORY_GEN_ALL_CORE_FT
PAST MEDICAL & SURGICAL HISTORY:  Other chronic pancreatitis      Anemia      OA (osteoarthritis) of knee  (Right knee)      Anxiety      Type 2 diabetes mellitus      S/P subtotal gastrectomy  (with gastrojejunal anastomosis, 30 years ago)      S/P cholecystectomy  (2002)      History of back surgery  (2007)      S/P arthroscopic knee surgery  (Right knee, 2007)      Chronic joint pain      GERD (gastroesophageal reflux disease)      Diabetes mellitus, type 2      Seasonal allergies      Anxiety and depression      S/P knee replacement

## 2024-01-09 NOTE — ED ADULT NURSE REASSESSMENT NOTE - NS ED NURSE REASSESS COMMENT FT1
Report received from Que PEREZ.  Assumed patient care at this time @1508.  Patient is confused, hx of dementia as per son at bedside.  pt ripped out IV, appears to be agitated.  House provider Forty made aware. VS stable (see flowsheet). Report received from Que PEREZ.  Assumed patient care at this time @0786.  Patient is confused, hx of dementia as per son at bedside.  pt ripped out IV, appears to be agitated.  House provider Forty made aware. VS stable (see flowsheet). Report received from Que PEREZ.  Assumed patient care at this time @1720.  Patient is confused, hx of dementia as per son at bedside.  pt ripped out IV, appears to be agitated.  House provider Viridiana REYES made aware. VS stable (see flowsheet). Report received from Que PEREZ.  Assumed patient care at this time @0665.  Patient is confused, hx of dementia as per son at bedside.  pt ripped out IV, appears to be agitated.  House provider Viridiana REYES made aware. VS stable (see flowsheet).

## 2024-01-09 NOTE — CARE COORDINATION ASSESSMENT. - FACILITY LEVEL OF CARE:
Son states that patient is independent with ambulation but does need assist with ADLs from family/assisted/supportive

## 2024-01-09 NOTE — H&P ADULT - NSHPPHYSICALEXAM_GEN_ALL_CORE
General: WN/WD NAD  PERRLA  Neurology: A&Ox3, nonfocal, ESTRADA x 4  Respiratory: CTA B/L  CV: RRR, S1S2, no murmurs, rubs or gallops  Abdominal: Soft, NT, ND +BS, Last BM  Extremities: No edema, + peripheral pulses  Skin Normal

## 2024-01-09 NOTE — CONSULT NOTE ADULT - ASSESSMENT
[facility]  Chart Reviewed-Full Consult to follow for any immediate concerns please fell free to contact us directly at  [phone] and have us paged or text my cell # [phone]  Osman Garcia MD PhD   [facility]  Chart Reviewed-Full Consult to follow for any immediate concerns please fell free to contact us directly at  [phone] and have us paged or text my cell # [phone]  Osman Garcia MD PhD   83-year-old female PMH of DM2, anxiety/depression, chronic joint and back pain, osteoarthritis, GERD, anemia, exsmoker dementia, presents to the emergency department with family sent by urgent care by ambulance with report of pneumonia.  One week of dry cough, generalized weakness, no appetite, 101 fever, low blood pressure.  Chest CT-Necrotizing right middle lobe pneumonia.    RECOMMENDATIONS  1-PNA concerning with fever, leukocytosis, necrotizing right middle lobe PNA on imaging in woman just returning from [place]. While TB is in the differential the appearance, lack of adenopathy and acuity suggesting bacterial and potentially polymicrobial. Agree with pulmonary involvement and recommend  -escalation to Zosyn 1/9  -will order sputum but discussion with pulm about possible bronchoscopy  -recs to follow    Thank you for consulting us and involving us in the management of this most interesting and challenging case.  We will follow along in the care of this patient. Please call us at [phone] or text me directly on my cell# at [phone] with any concerns.   83-year-old female PMH of DM2, anxiety/depression, chronic joint and back pain, osteoarthritis, GERD, anemia, exsmoker dementia, presents to the emergency department with family sent by urgent care by ambulance with report of pneumonia.  One week of dry cough, generalized weakness, no appetite, 101 fever, low blood pressure.  Chest CT-Necrotizing right middle lobe pneumonia.    RECOMMENDATIONS  1-PNA concerning with fever, leukocytosis, necrotizing right middle lobe PNA on imaging in woman just returning from [place]. While TB is in the differential the appearance, lack of adenopathy and acuity suggesting bacterial and potentially polymicrobial. Agree with pulmonary involvement and recommend  -escalation to Zosyn 1/9  -will order sputum but discussion with pulm about possible bronchoscopy  -recs to follow    Thank you for consulting us and involving us in the management of this most interesting and challenging case.  We will follow along in the care of this patient. Please call us at [phone] or text me directly on my cell# at [phone] with any concerns.

## 2024-01-09 NOTE — H&P ADULT - HISTORY OF PRESENT ILLNESS
83-year-old female PMH of DM2, anxiety/depression, chronic joint and back pain, osteoarthritis, GERD, anemia, exsmoker dementia, presents to the emergency department with son sent by urgent care by ambulance with report of pneumonia.  Patient states for the past week patient has had dry cough, generalized weakness, no appetite, at urgent care patient flu/COVID was negative, concern for patient having 101 fever, low blood pressure, and right sided lung infiltrate. 83-year-old female PMH of DM2, anxiety/depression, chronic joint and back pain, osteoarthritis, GERD, anemia, exsmoker dementia, presents to the emergency department with son sent by urgent care by ambulance with report of pneumonia.  Patient states for the past week patient has had dry cough, generalized weakness, no appetite, at urgent care patient flu/COVID was negative, concern for patient having 101 fever, low blood pressure, and right sided lung infiltrate.  Pt just came back from [place] and as per son the doctors there had stopped all her meds except Seroquel and mirtazapine , she is not on any meds for diabetes

## 2024-01-09 NOTE — CARE COORDINATION ASSESSMENT. - NSCAREPROVIDERS_GEN_ALL_CORE_FT
CARE PROVIDERS:  Accepting Physician: Bee Conti  Administration: Radha Kendrick  Administration: Fermín Gunter  Admitting: Bee Conti  Attending: Bee Conti  Case Management: Lior Coyne  Consultant: Simon Zamora  Consultant: Weil, Patricia  Covering Team: rBii Kemp  ED Attending: Aquiles Rich  ED Nurse: Colton Oakes  Emergency Medicine: Ana Maria Dyson  Nurse: Kitty Iverson  Nurse: Colton Oakes  Ordered: Doctor, Unknown  Ordered: ADM, User  Ordered: ServiceAccount, SCMMLM  Outpatient Provider: Dimas Franco  Outpatient Provider: Marta Colunga  PCA/Nursing Assistant: Temitope Stephens  PCA/Nursing Assistant: Lian Sue  Respiratory Therapy: Seul, Min  UR// Supp. Assoc.: Purvi Landaverde   CARE PROVIDERS:  Accepting Physician: Bee Conti  Administration: Radha Kendrick  Administration: Fermín Gunter  Admitting: Bee Conti  Attending: Bee Conti  Case Management: Lior Coyne  Consultant: Simon Zamora  Consultant: Weil, Patricia  Covering Team: Brii Kemp  ED Attending: Aquiles Rich  ED Nurse: Colton Oakes  Emergency Medicine: Ana Maria Dyson  Nurse: Kitty Iverson  Nurse: Colton Oakes  Ordered: Doctor, Unknown  Ordered: ADM, User  Ordered: ServiceAccount, SCMMLM  Outpatient Provider: Dimas Franco  Outpatient Provider: Marta Colunga  PCA/Nursing Assistant: Temitope Stephens  PCA/Nursing Assistant: Lian Sue  Respiratory Therapy: Seul, Min  UR// Supp. Assoc.: Purvi Landaverde

## 2024-01-09 NOTE — CONSULT NOTE ADULT - SUBJECTIVE AND OBJECTIVE BOX
Patient is a 83y old  Female who presents with a chief complaint of fever (09 Jan 2024 15:06)    Type: DX year known complications Endocrine Last seen Rx home Hx DKA/HHS, Glucometer checks, needs, weight, diet, exercise  diabetes education provided  Hx ASCVD, CKD, HF    HPI:  83-year-old female PMH of DM2, anxiety/depression, chronic joint and back pain, osteoarthritis, GERD, anemia, exsmoker dementia, presents to the emergency department with son sent by urgent care by ambulance with report of pneumonia.  Patient states for the past week patient has had dry cough, generalized weakness, no appetite, at urgent care patient flu/COVID was negative, concern for patient having 101 fever, low blood pressure, and right sided lung infiltrate.  Pt just came back from [place] and as per son the doctors there had stopped all her meds except Seroquel and mirtazapine , she is not on any meds for diabetes  (09 Jan 2024 07:42)      PAST MEDICAL & SURGICAL HISTORY:  Type 2 diabetes mellitus      Anxiety      OA (osteoarthritis) of knee  (Right knee)      Anemia      Other chronic pancreatitis      Anxiety and depression      Seasonal allergies      Diabetes mellitus, type 2      GERD (gastroesophageal reflux disease)      Chronic joint pain      S/P arthroscopic knee surgery  (Right knee, 2007)      History of back surgery  (2007)      S/P cholecystectomy  (2002)      S/P subtotal gastrectomy  (with gastrojejunal anastomosis, 30 years ago)      S/P knee replacement          Allergies    No Known Allergies    Intolerances        MEDICATIONS  (STANDING):  albuterol/ipratropium for Nebulization 3 milliLiter(s) Nebulizer every 6 hours  dextrose 5%. 1000 milliLiter(s) (100 mL/Hr) IV Continuous <Continuous>  dextrose 5%. 1000 milliLiter(s) (50 mL/Hr) IV Continuous <Continuous>  dextrose 50% Injectable 12.5 Gram(s) IV Push once  dextrose 50% Injectable 25 Gram(s) IV Push once  dextrose 50% Injectable 25 Gram(s) IV Push once  glucagon  Injectable 1 milliGRAM(s) IntraMuscular once  heparin   Injectable 5000 Unit(s) SubCutaneous every 8 hours  insulin glargine Injectable (LANTUS) 12 Unit(s) SubCutaneous every morning  insulin lispro (ADMELOG) corrective regimen sliding scale   SubCutaneous three times a day before meals  insulin lispro (ADMELOG) corrective regimen sliding scale   SubCutaneous at bedtime  insulin lispro Injectable (ADMELOG) 4 Unit(s) SubCutaneous three times a day before meals  mirtazapine Soltab 15 milliGRAM(s) Oral at bedtime  piperacillin/tazobactam IVPB.. 3.375 Gram(s) IV Intermittent every 8 hours  QUEtiapine 25 milliGRAM(s) Oral at bedtime  sodium chloride 0.9%. 1000 milliLiter(s) (75 mL/Hr) IV Continuous <Continuous>

## 2024-01-09 NOTE — CONSULT NOTE ADULT - PROBLEM SELECTOR RECOMMENDATION 9
Type 2 A1c 11.1% adm PN  Recommend endocrine-Perlman on consult  cont lantus 12 units HS; lispro 4 units AC and low correction- added low HS scale  FU appt: TBA  DSC recommendations: return to home regimen and glucose monitoring  diabetes education provided  Diabetes support info and cell # [phone] given   Goal 100-180 mg/dL; 140-180 mg/dL in critical care areas Type 2 A1c 11.1% adm PN  Recommend endocrine-Perlman on consult  cont lantus 12 units HS; lispro 4 units AC and low correction- added low HS scale  FU appt: TBA  DSC recommendations: return to home regimen and glucose monitoring  diabetes education provided  Diabetes support info and cell # [phone] given   Goal 100-180 mg/dL; 140-180 mg/dL in critical care areas

## 2024-01-09 NOTE — CHART NOTE - NSCHARTNOTEFT_GEN_A_CORE
RN called as pt agitated, pulling out IV line. Her son is at bedside.  Pt evaluated at bedside, she is awake, trying to pull out other IV. Son is at bedside, and states that this occurs every night 2/2 her dementia. Her home dementia meds include Quetiapine 25 mg nightly, mirtazapine 30 mg, and memantine.    Patient was found to have acute delirium with agitated behavior. Attempts at de-escalation including patient re-orientation, were done but unsuccessful. Despite multiple de-escalation attempts, the patient remains acutely agitated and confused. Son at bedside is agreeable to give IM medication for acute agitation.  Due to severely agitated state and high risk of self-harm, patient required treatment with parenteral antipsychotics – Olanzapine 5 mg IM.  Continue monitoring and supportive measures.      Addendum at : RN called as pt agitated, pulling out IV line. Her son is at bedside.  Pt evaluated at bedside, she is awake, trying to pull out other IV. Son is at bedside, and states that this occurs every night 2/2 her dementia. Her home dementia meds include Quetiapine 25 mg nightly, mirtazapine 30 mg, and memantine.    Patient was found to have acute delirium with agitated behavior. Attempts at de-escalation including patient re-orientation, were done but unsuccessful. Despite multiple de-escalation attempts, the patient remains acutely agitated and confused. Son at bedside is agreeable to give IM medication for acute agitation.  Due to severely agitated state and high risk of self-harm, patient required treatment with parenteral antipsychotics – Olanzapine 5 mg IM.  EKG ordered to eval QTc  Continue monitoring and supportive measures.      Addendum at 0110am  RN called because the lab has critical value from CMP collected at 2205 pm with Glu 624. The lab had not contacted any physician about this prior.  I instructed the RN to obtain stat finger stick, with Glu 546    Per son at bedside, the pt was previously on Metformin for T2DM, but has not been taking this for several months as she went to [facility] and the doctor there stopped all of her diabetes medications. Per son the pt was never on insulin in the past.  Will give Admelog 10 units stat  FU finger stick in 2 hours  Start diabetes order set with hypoglycemia protocol  FU AM A1C  start LDISS q6h while NPO  Day team to evaluate need for basal insulin  continue to monitor  RN to notify with any changes RN called as pt agitated, pulling out IV line. Her son is at bedside.  Pt evaluated at bedside, she is awake, trying to pull out other IV. Son is at bedside, and states that this occurs every night 2/2 her dementia. Her home dementia meds include Quetiapine 25 mg nightly, mirtazapine 30 mg, and memantine.    Patient was found to have acute delirium with agitated behavior. Attempts at de-escalation including patient re-orientation, were done but unsuccessful. Despite multiple de-escalation attempts, the patient remains acutely agitated and confused. Son at bedside is agreeable to give IM medication for acute agitation.  Due to severely agitated state and high risk of self-harm, patient required treatment with parenteral antipsychotics – Olanzapine 5 mg IM.  EKG ordered to eval QTc  Continue monitoring and supportive measures.      Addendum at 0110am  RN called because the lab has critical value from CMP collected at 2205 pm with Glu 624. The lab had not contacted any physician about this prior.  I instructed the RN to obtain stat finger stick, with Glu 546    Per son at bedside, the pt was previously on Metformin for T2DM, but has not been taking this for several months as she went to [facility] and the doctor there stopped all of her diabetes medications. Per son the pt was never on insulin in the past.  Will give Admelog 10 units stat  FU finger stick in 2 hours  Start diabetes order set with hypoglycemia protocol  FU AM A1C  start LDISS q6h while NPO  Day team to evaluate need for basal insulin  continue to monitor  RN to notify with any changes RN called as pt agitated, pulling out IV line. Her son is at bedside.  Pt evaluated at bedside, she is awake, trying to pull out other IV. Son is at bedside, and states that this occurs every night 2/2 her dementia. Her home dementia meds include Quetiapine 25 mg nightly, mirtazapine 30 mg, and memantine.    Patient was found to have acute delirium with agitated behavior. Attempts at de-escalation including patient re-orientation, were done but unsuccessful. Despite multiple de-escalation attempts, the patient remains acutely agitated and confused. Son at bedside is agreeable to give IM medication for acute agitation.  Due to severely agitated state and high risk of self-harm, patient required treatment with parenteral antipsychotics – Olanzapine 5 mg IM.  EKG ordered to eval QTc  Continue monitoring and supportive measures.      Addendum at 0110am  RN called because the lab has critical value from CMP collected at 2205 pm with Glu 624. The lab had not contacted any physician about this prior.  I instructed the RN to obtain stat finger stick, with Glu 546    Per son at bedside, the pt was previously on Metformin for T2DM, but has not been taking this for several months as she went to [facility] and the doctor there stopped all of her diabetes medications. Per son the pt was never on insulin in the past.  Will give Admelog 10 units stat  FU finger stick in 2 hours  Start diabetes order set with hypoglycemia protocol  FU AM A1C  start LDISS q6h while NPO  Day team to evaluate need for basal insulin  continue to monitor  RN to notify with any changes      Addendum [id number]  Finger stick 2 hours post insulin = 307  pt due for q6 finger stick at 0600 with sliding scale  continue to monitor  RN to notify with any changes RN called as pt agitated, pulling out IV line. Her son is at bedside.  Pt evaluated at bedside, she is awake, trying to pull out other IV. Son is at bedside, and states that this occurs every night 2/2 her dementia. Her home dementia meds include Quetiapine 25 mg nightly, mirtazapine 30 mg, and memantine.    Patient was found to have acute delirium with agitated behavior. Attempts at de-escalation including patient re-orientation, were done but unsuccessful. Despite multiple de-escalation attempts, the patient remains acutely agitated and confused. Son at bedside is agreeable to give IM medication for acute agitation.  Due to severely agitated state and high risk of self-harm, patient required treatment with parenteral antipsychotics – Olanzapine 5 mg IM.  EKG ordered to eval QTc  Continue monitoring and supportive measures.      Addendum at 0110am  RN called because the lab has critical value from CMP collected at 2205 pm with Glu 624. The lab had not contacted any physician about this prior.  I instructed the RN to obtain stat finger stick, with Glu 546    Per son at bedside, the pt was previously on Metformin for T2DM, but has not been taking this for several months as she went to [facility] and the doctor there stopped all of her diabetes medications. Per son the pt was never on insulin in the past.  Will give Admelog 10 units stat  FU finger stick in 2 hours  Start diabetes order set with hypoglycemia protocol  FU AM A1C  start LDISS q6h while NPO  Day team to evaluate need for basal insulin  continue to monitor  RN to notify with any changes      Addendum [id number]  Finger stick 2 hours post insulin = 307  pt due for q6 finger stick at 0600 with sliding scale  continue to monitor  RN to notify with any changes

## 2024-01-09 NOTE — CARE COORDINATION ASSESSMENT. - OTHER PERTINENT DISCHARGE PLANNING INFORMATION:
Met with patient and son at bedside to discuss the role of case management with verbalized understanding.  Needs unclear at present.  Patient admitted with PN, on IVAX.  Son denies any [facility] or DME services prior to admission.  Will remian available from a case management perspective.

## 2024-01-09 NOTE — CONSULT NOTE ADULT - SUBJECTIVE AND OBJECTIVE BOX
[facility]  Mitchell Cantrell PA-C  [address] [place] [address]  [phone]      Chief Complaint:  Patient is a 83y old  Female who presents with a chief complaint of fever (09 Jan 2024 09:37)      HPI:83-year-old female PMH of DM2, anxiety/depression, chronic joint and back pain, osteoarthritis, GERD, anemia, exsmoker dementia, presents to the emergency department with family sent by urgent care by ambulance with report of pneumonia.  Patient states for the past week patient has had dry cough, generalized weakness, no appetite, at urgent care patient flu/COVID was negative, concern for patient having 101 fever, low blood pressure, and right sided lung infiltrate. Pt just came back from [place] and as per family the doctors there had stopped all her meds except Seroquel and mirtazapine , she is not on any meds for diabetes    Allergies:  No Known Allergies      Medications:  acetaminophen     Tablet .. 650 milliGRAM(s) Oral every 6 hours PRN  albuterol/ipratropium for Nebulization 3 milliLiter(s) Nebulizer every 6 hours  dextrose 5%. 1000 milliLiter(s) IV Continuous <Continuous>  dextrose 5%. 1000 milliLiter(s) IV Continuous <Continuous>  dextrose 50% Injectable 12.5 Gram(s) IV Push once  dextrose 50% Injectable 25 Gram(s) IV Push once  dextrose 50% Injectable 25 Gram(s) IV Push once  dextrose Oral Gel 15 Gram(s) Oral once PRN  glucagon  Injectable 1 milliGRAM(s) IntraMuscular once  guaiFENesin Oral Liquid (Sugar-Free) 200 milliGRAM(s) Oral every 6 hours PRN  heparin   Injectable 5000 Unit(s) SubCutaneous every 8 hours  insulin glargine Injectable (LANTUS) 12 Unit(s) SubCutaneous every morning  insulin lispro (ADMELOG) corrective regimen sliding scale   SubCutaneous three times a day before meals  insulin lispro Injectable (ADMELOG) 4 Unit(s) SubCutaneous three times a day before meals  mirtazapine Soltab 15 milliGRAM(s) Oral at bedtime  piperacillin/tazobactam IVPB.. 3.375 Gram(s) IV Intermittent every 8 hours  QUEtiapine 25 milliGRAM(s) Oral at bedtime  sodium chloride 0.9%. 1000 milliLiter(s) IV Continuous <Continuous>      PMHX/PSHX:  DM (diabetes mellitus)    Type 2 diabetes mellitus    Anxiety    OA (osteoarthritis) of knee    Anemia    Other chronic pancreatitis    DM (diabetes mellitus)    HLD (hyperlipidemia)    Anxiety and depression    Seasonal allergies    Diabetes mellitus, type 2    GERD (gastroesophageal reflux disease)    Chronic joint pain    S/P arthroscopic knee surgery    History of back surgery    S/P cholecystectomy    S/P subtotal gastrectomy    S/P knee replacement        Family history:  No pertinent family history in first degree relatives    No pertinent family history in first degree relatives        Social History:     ROS:     General:  no fevers, chills, night sweats, fatigue,   Eyes:  Good vision, no reported pain  ENT:  No sore throat, pain, runny nose, dysphagia  CV:  No pain, palpitations, hypo/hypertension  Resp:  No dyspnea, cough, tachypnea, wheezing  GI:  No pain, No nausea, No vomiting, No diarrhea, No constipation, No weight loss, No fever, No pruritis, No rectal bleeding, No tarry stools, No dysphagia,  :  No pain, bleeding, incontinence, nocturia  Muscle:  No pain, weakness  Neuro:  No weakness, tingling, memory problems  Psych:  No fatigue, insomnia, mood problems, depression  Endocrine:  No polyuria, polydipsia, cold/heat intolerance  Heme:  No petechiae, ecchymosis, easy bruisability  Skin:  No rash, tattoos, scars, edema      PHYSICAL EXAM:   Vital Signs:  Vital Signs Last 24 Hrs  T(C): 36.9 (09 Jan 2024 08:15), Max: 37.1 (08 Jan 2024 23:57)  T(F): 98.5 (09 Jan 2024 08:15), Max: 98.7 (08 Jan 2024 23:57)  HR: 95 (09 Jan 2024 08:15) (71 - 98)  BP: 145/56 (09 Jan 2024 08:15) (105/58 - 145/56)  BP(mean): --  RR: 22 (09 Jan 2024 08:15) (16 - 22)  SpO2: 97% (09 Jan 2024 08:15) (94% - 100%)    Parameters below as of 09 Jan 2024 06:41  Patient On (Oxygen Delivery Method): room air      Daily Height in cm: 152.4 (08 Jan 2024 21:07)    Daily     GENERAL:  Appears stated age,   HEENT:  NC/AT,    CHEST:  Full & symmetric excursion,   HEART:  Regular rhythm  ABDOMEN:  Soft, non-tender, non-distended,   EXTEREMITIES:  no cyanosis,clubbing or edema  SKIN:  No rash  NEURO:  Alert,    LABS:                        8.3    16.34 )-----------( 224      ( 09 Jan 2024 11:15 )             24.7     01-09    137  |  106  |  28<H>  ----------------------------<  203<H>  4.5   |  25  |  1.00    Ca    8.3<L>      09 Jan 2024 11:15    TPro  6.9  /  Alb  2.3<L>  /  TBili  0.5  /  DBili  x   /  AST  28  /  ALT  16  /  AlkPhos  114  01-09    LIVER FUNCTIONS - ( 09 Jan 2024 11:15 )  Alb: 2.3 g/dL / Pro: 6.9 g/dL / ALK PHOS: 114 U/L / ALT: 16 U/L / AST: 28 U/L / GGT: x           PT/INR - ( 08 Jan 2024 22:05 )   PT: 12.4 sec;   INR: 1.06 ratio         PTT - ( 08 Jan 2024 22:05 )  PTT:30.2 sec  Urinalysis Basic - ( 09 Jan 2024 11:15 )    Color: x / Appearance: x / SG: x / pH: x  Gluc: 203 mg/dL / Ketone: x  / Bili: x / Urobili: x   Blood: x / Protein: x / Nitrite: x   Leuk Esterase: x / RBC: x / WBC x   Sq Epi: x / Non Sq Epi: x / Bacteria: x          Imaging:           [facility]  Mitchell Cantrell PA-C  [address] [place] [address]  [phone]      Chief Complaint:  Patient is a 83y old  Female who presents with a chief complaint of fever (09 Jan 2024 09:37)      HPI:83-year-old female PMH of DM2, anxiety/depression, chronic joint and back pain, osteoarthritis, GERD, anemia, exsmoker dementia, presents to the emergency department with family sent by urgent care by ambulance with report of pneumonia.  Patient states for the past week patient has had dry cough, generalized weakness, no appetite, at urgent care patient flu/COVID was negative, concern for patient having 101 fever, low blood pressure, and right sided lung infiltrate. Pt just came back from [place] and as per family the doctors there had stopped all her meds except Seroquel and mirtazapine , she is not on any meds for diabetes    Allergies:  No Known Allergies      Medications:  acetaminophen     Tablet .. 650 milliGRAM(s) Oral every 6 hours PRN  albuterol/ipratropium for Nebulization 3 milliLiter(s) Nebulizer every 6 hours  dextrose 5%. 1000 milliLiter(s) IV Continuous <Continuous>  dextrose 5%. 1000 milliLiter(s) IV Continuous <Continuous>  dextrose 50% Injectable 12.5 Gram(s) IV Push once  dextrose 50% Injectable 25 Gram(s) IV Push once  dextrose 50% Injectable 25 Gram(s) IV Push once  dextrose Oral Gel 15 Gram(s) Oral once PRN  glucagon  Injectable 1 milliGRAM(s) IntraMuscular once  guaiFENesin Oral Liquid (Sugar-Free) 200 milliGRAM(s) Oral every 6 hours PRN  heparin   Injectable 5000 Unit(s) SubCutaneous every 8 hours  insulin glargine Injectable (LANTUS) 12 Unit(s) SubCutaneous every morning  insulin lispro (ADMELOG) corrective regimen sliding scale   SubCutaneous three times a day before meals  insulin lispro Injectable (ADMELOG) 4 Unit(s) SubCutaneous three times a day before meals  mirtazapine Soltab 15 milliGRAM(s) Oral at bedtime  piperacillin/tazobactam IVPB.. 3.375 Gram(s) IV Intermittent every 8 hours  QUEtiapine 25 milliGRAM(s) Oral at bedtime  sodium chloride 0.9%. 1000 milliLiter(s) IV Continuous <Continuous>      PMHX/PSHX:  DM (diabetes mellitus)    Type 2 diabetes mellitus    Anxiety    OA (osteoarthritis) of knee    Anemia    Other chronic pancreatitis    DM (diabetes mellitus)    HLD (hyperlipidemia)    Anxiety and depression    Seasonal allergies    Diabetes mellitus, type 2    GERD (gastroesophageal reflux disease)    Chronic joint pain    S/P arthroscopic knee surgery    History of back surgery    S/P cholecystectomy    S/P subtotal gastrectomy    S/P knee replacement        Family history:  No pertinent family history in first degree relatives    No pertinent family history in first degree relatives        Social History:     ROS:     General:  no fevers, chills, night sweats, fatigue,   Eyes:  Good vision, no reported pain  ENT:  No sore throat, pain, runny nose, dysphagia  CV:  No pain, palpitations, hypo/hypertension  Resp:  No dyspnea, cough, tachypnea, wheezing  GI:  No pain, No nausea, No vomiting, No diarrhea, No constipation, No weight loss, No fever, No pruritis, No rectal bleeding, No tarry stools, No dysphagia,  :  No pain, bleeding, incontinence, nocturia  Muscle:  No pain, weakness  Neuro:  No weakness, tingling, memory problems  Psych:  No fatigue, insomnia, mood problems, depression  Endocrine:  No polyuria, polydipsia, cold/heat intolerance  Heme:  No petechiae, ecchymosis, easy bruisability  Skin:  No rash, tattoos, scars, edema      PHYSICAL EXAM:   Vital Signs:  Vital Signs Last 24 Hrs  T(C): 36.9 (09 Jan 2024 08:15), Max: 37.1 (08 Jan 2024 23:57)  T(F): 98.5 (09 Jan 2024 08:15), Max: 98.7 (08 Jan 2024 23:57)  HR: 95 (09 Jan 2024 08:15) (71 - 98)  BP: 145/56 (09 Jan 2024 08:15) (105/58 - 145/56)  BP(mean): --  RR: 22 (09 Jan 2024 08:15) (16 - 22)  SpO2: 97% (09 Jan 2024 08:15) (94% - 100%)    Parameters below as of 09 Jan 2024 06:41  Patient On (Oxygen Delivery Method): room air      Daily Height in cm: 152.4 (08 Jan 2024 21:07)    Daily     GENERAL:  Appears stated age,   HEENT:  NC/AT,    CHEST:  Full & symmetric excursion,   HEART:  Regular rhythm  ABDOMEN:  Soft, non-tender, non-distended,   EXTEREMITIES:  no cyanosis,clubbing or edema  SKIN:  No rash  NEURO:  Alert,    LABS:                        8.3    16.34 )-----------( 224      ( 09 Jan 2024 11:15 )             24.7     01-09    137  |  106  |  28<H>  ----------------------------<  203<H>  4.5   |  25  |  1.00    Ca    8.3<L>      09 Jan 2024 11:15    TPro  6.9  /  Alb  2.3<L>  /  TBili  0.5  /  DBili  x   /  AST  28  /  ALT  16  /  AlkPhos  114  01-09    LIVER FUNCTIONS - ( 09 Jan 2024 11:15 )  Alb: 2.3 g/dL / Pro: 6.9 g/dL / ALK PHOS: 114 U/L / ALT: 16 U/L / AST: 28 U/L / GGT: x           PT/INR - ( 08 Jan 2024 22:05 )   PT: 12.4 sec;   INR: 1.06 ratio         PTT - ( 08 Jan 2024 22:05 )  PTT:30.2 sec  Urinalysis Basic - ( 09 Jan 2024 11:15 )    Color: x / Appearance: x / SG: x / pH: x  Gluc: 203 mg/dL / Ketone: x  / Bili: x / Urobili: x   Blood: x / Protein: x / Nitrite: x   Leuk Esterase: x / RBC: x / WBC x   Sq Epi: x / Non Sq Epi: x / Bacteria: x          Imaging:

## 2024-01-09 NOTE — CHART NOTE - NSCHARTNOTEFT_GEN_A_CORE
RN called as pt is agitated, and pulled out IV line.  Pt evaluated at bedside. She is awake, speaking, with dried blood on arm.   Called dixie Arthur as  as the pt speaks a dialect which is not covered by  line.  Jerson spoke with the pt, who is asking for her parents, asking for depends diaper, is crying out. Informed the pt that she can go to the bathroom while she sits there, as she has purewick in place.  Per dixie Arthur, this happens every night, the pt becomes more confused, cries out. At home they will give her the seroquel, and she usually calms down within 20-40 min.    RN gave the pt her scheduled seroquel  RN will attempt to regain IV line  Asked dixie Arthur if he could come into the hospital, but he is working tonight until 2 pm and is not available to come to bedside until then. He remains available on the phone. Jerson knows that the pt required zyprexa last night for agitation, and is agreeable to repeat zyprexa tonight if it is necessary.  Continue to monitor.  RN to notify with any changes. RN called as pt is agitated, and pulled out IV line.  Pt evaluated at bedside. She is awake, speaking, with dried blood on arm.   Called dixie Arthur as  as the pt speaks a dialect which is not covered by  line.  Jerson spoke with the pt, who is asking for her parents, asking for depends diaper, is crying out. Informed the pt that she can go to the bathroom while she sits there, as she has purewick in place.  Per dixie Arthur, this happens every night, the pt becomes more confused, cries out. At home they will give her the seroquel, and she usually calms down within 20-40 min. At her baseline she will forget a conversation within 5 minutes.    RN gave the pt her scheduled seroquel and remeron  RN will attempt to regain IV line  Asked dixie Arthur if he could come into the hospital, but he is working tonight until 2 pm and is not available to come to bedside until then. He remains available on the phone. Jerson knows that the pt required zyprexa last night for agitation, and is agreeable to repeat zyprexa tonight if it is necessary.  Continue to monitor.  RN to notify with any changes.      Addendum at  1042  RN called as the pt is very agitated, fighting and kicking when she is trying to replace the IV the pt pulled out.  Patient was found to have acute delirium with agitated behavior. Attempts at de-escalation including patient re-orientation, were done but unsuccessful. Despite multiple de-escalation attempts, the patient remains acutely agitated and confused.   Due to severely agitated state and high risk of self-harm, patient required treatment with parenteral antipsychotics – Olanzapine 2.5mg.  Rn to place 2 IVs when able  Continue monitoring and supportive measures.  Updated dixie Arthur by phone  Continue to monitor.  RN to notify with any changes. RN called as pt is agitated, and pulled out IV line.  Pt evaluated at bedside. She is awake, speaking, with dried blood on arm.   Called dixie Arthur as  as the pt speaks a dialect which is not covered by  line.  Jerson spoke with the pt, who is asking for her parents, asking for depends diaper, is crying out. Informed the pt that she can go to the bathroom while she sits there, as she has purewick in place.  Per dixie Arthur, this happens every night, the pt becomes more confused, cries out. At home they will give her the seroquel, and she usually calms down within 20-40 min. At her baseline she will forget a conversation within 5 minutes.    RN gave the pt her scheduled seroquel and remeron  RN will attempt to regain IV line  Asked dixie Arthur if he could come into the hospital, but he is working tonight until 2 pm and is not available to come to bedside until then. He remains available on the phone. Jerson knows that the pt required zyprexa last night for agitation, and is agreeable to repeat zyprexa tonight if it is necessary.  Continue to monitor.  RN to notify with any changes.      Addendum at  1242  RN called as the pt is very agitated, fighting and kicking when she is trying to replace the IV the pt pulled out.  Patient was found to have acute delirium with agitated behavior. Attempts at de-escalation including patient re-orientation, were done but unsuccessful. Despite multiple de-escalation attempts, the patient remains acutely agitated and confused.   Due to severely agitated state and high risk of self-harm, patient required treatment with parenteral antipsychotics – Olanzapine 2.5mg.  RN to place 2 IVs when able  Continue monitoring and supportive measures.  Updated dixie Arthur by phone  Continue to monitor.  RN to notify with any changes.      Addendum at 1345  RN called as pt continues to be agitated with kicking whenever anyone touches her to replace IV.  Pt evaluated at bedside. She is grabbing any staff that try to touch her. She is urinating with purewick.  Despite multiple de-escalation attempts, the patient remains acutely agitated and confused.   Due to severely agitated state and high risk of self-harm, will give additional Olanzapine 2.5 IM now, so that RN can place IV for vital IV antibiotics and IV fluids  Continue monitoring and supportive measures.  Continue to monitor.  RN to notify with any changes. RN called as pt is agitated, and pulled out IV line.  Pt evaluated at bedside. She is awake, speaking, with dried blood on arm.   Called dixie Arthur as  as the pt speaks a dialect which is not covered by  line.  Jerson spoke with the pt, who is asking for her parents, asking for depends diaper, is crying out. Informed the pt that she can go to the bathroom while she sits there, as she has purewick in place.  Per dixie Arthur, this happens every night, the pt becomes more confused, cries out. At home they will give her the seroquel, and she usually calms down within 20-40 min. At her baseline she will forget a conversation within 5 minutes.    RN gave the pt her scheduled seroquel and remeron  RN will attempt to regain IV line  Asked dixie Arthur if he could come into the hospital, but he is working tonight until 2 pm and is not available to come to bedside until then. He remains available on the phone. Jerson knows that the pt required zyprexa last night for agitation, and is agreeable to repeat zyprexa tonight if it is necessary.  Continue to monitor.  RN to notify with any changes.      Addendum at  1242  RN called as the pt is very agitated, fighting and kicking when she is trying to replace the IV the pt pulled out.  Patient was found to have acute delirium with agitated behavior. Attempts at de-escalation including patient re-orientation, were done but unsuccessful. Despite multiple de-escalation attempts, the patient remains acutely agitated and confused.   Due to severely agitated state and high risk of self-harm, patient required treatment with parenteral antipsychotics – Olanzapine 2.5mg.  RN to place 2 IVs when able  Continue monitoring and supportive measures.  Updated dixie Arthur by phone  Continue to monitor.  RN to notify with any changes.      Addendum at 1345  RN called as pt continues to be agitated with kicking whenever anyone touches her to replace IV.  Pt evaluated at bedside. She is grabbing any staff that try to touch her. She is urinating with purewick.  Due to severely agitated state and high risk of self-harm, will give additional Olanzapine 2.5 IM now, so that RN can place IV for vital IV antibiotics and IV fluids  Continue monitoring and supportive measures.  Continue to monitor.  RN to notify with any changes. RN called as pt is agitated, and pulled out IV line.  Pt evaluated at bedside. She is awake, speaking, with dried blood on arm.   Called dixie Arthur as  as the pt speaks a dialect which is not covered by  line.  Jerson spoke with the pt, who is asking for her parents, asking for depends diaper, is crying out. Informed the pt that she can go to the bathroom while she sits there, as she has purewick in place.  Per dixie Arthur, this happens every night, the pt becomes more confused, cries out. At home they will give her the seroquel, and she usually calms down within 20-40 min. At her baseline she will forget a conversation within 5 minutes.    RN gave the pt her scheduled seroquel 25 mg and remeron  RN will attempt to regain IV line  Asked dixie Arthur if he could come into the hospital, but he is working tonight until 2 pm and is not available to come to bedside until then. He remains available on the phone. Jerson knows that the pt required zyprexa last night for agitation, and is agreeable to repeat zyprexa tonight if it is necessary.  Continue to monitor.  RN to notify with any changes.      Addendum at  8122  RN called as the pt is very agitated, fighting and kicking when she is trying to replace the IV the pt pulled out.  Patient was found to have acute delirium with agitated behavior. Attempts at de-escalation including patient re-orientation, were done but unsuccessful. Despite multiple de-escalation attempts, the patient remains acutely agitated and confused.   Due to severely agitated state and high risk of self-harm, patient required treatment with parenteral antipsychotics – Olanzapine 2.5mg IM x1.  RN to place 2 IVs when able  Continue monitoring and supportive measures.  Updated dixie Arthur by phone  Continue to monitor.  RN to notify with any changes.      Addendum at 4463  RN called as pt continues to be agitated with kicking whenever anyone touches her to replace IV.  Pt evaluated at bedside. She is grabbing any staff that try to touch her. She is urinating with purewick.  Due to severely agitated state and high risk of self-harm, will give additional Olanzapine 2.5 IM now, so that RN can place IV for vital IV antibiotics and IV fluids  Continue monitoring and supportive measures.  Continue to monitor.  RN to notify with any changes.      Addendum at 0245  Rn called as they were unable to place an IV as the pt continues to be agitated with kicking whenever anyone touches her to replace the IV.   Pt evaluated at bedside. she is sitting up in bed, pulling at blanket, becomes agitated whenever anyone tries to touch her.   Pt is refusing IV placement.  As pt continues to be agitated despite attempting to redirect by calling family and receiving Seroquel 25 mg, remeron, and zyprexa 2.5 mg IM x2, will let the pt rest at this time.  Will have staff place IV in AM.  Continue to monitor.  RN to notify with any changes. RN called as pt is agitated, and pulled out IV line.  Pt evaluated at bedside. She is awake, speaking, with dried blood on arm.   Called dixie Arthur as  as the pt speaks a dialect which is not covered by  line.  Jerson spoke with the pt, who is asking for her parents, asking for depends diaper, is crying out. Informed the pt that she can go to the bathroom while she sits there, as she has purewick in place.  Per dixie Arthur, this happens every night, the pt becomes more confused, cries out. At home they will give her the seroquel, and she usually calms down within 20-40 min. At her baseline she will forget a conversation within 5 minutes.    RN gave the pt her scheduled seroquel 25 mg and remeron  RN will attempt to regain IV line  Asked dixie Arthur if he could come into the hospital, but he is working tonight until 2 pm and is not available to come to bedside until then. He remains available on the phone. Jerson knows that the pt required zyprexa last night for agitation, and is agreeable to repeat zyprexa tonight if it is necessary.  Continue to monitor.  RN to notify with any changes.      Addendum at  3322  RN called as the pt is very agitated, fighting and kicking when she is trying to replace the IV the pt pulled out.  Patient was found to have acute delirium with agitated behavior. Attempts at de-escalation including patient re-orientation, were done but unsuccessful. Despite multiple de-escalation attempts, the patient remains acutely agitated and confused.   Due to severely agitated state and high risk of self-harm, patient required treatment with parenteral antipsychotics – Olanzapine 2.5mg IM x1.  RN to place 2 IVs when able  Continue monitoring and supportive measures.  Updated dixie Arthur by phone  Continue to monitor.  RN to notify with any changes.      Addendum at 3409  RN called as pt continues to be agitated with kicking whenever anyone touches her to replace IV.  Pt evaluated at bedside. She is grabbing any staff that try to touch her. She is urinating with purewick.  Due to severely agitated state and high risk of self-harm, will give additional Olanzapine 2.5 IM now, so that RN can place IV for vital IV antibiotics and IV fluids  Continue monitoring and supportive measures.  Continue to monitor.  RN to notify with any changes.      Addendum at 0245  Rn called as they were unable to place an IV as the pt continues to be agitated with kicking whenever anyone touches her to replace the IV.   Pt evaluated at bedside. she is sitting up in bed, pulling at blanket, becomes agitated whenever anyone tries to touch her.   Pt is refusing IV placement.  As pt continues to be agitated despite attempting to redirect by calling family and receiving Seroquel 25 mg, remeron, and zyprexa 2.5 mg IM x2, will let the pt rest at this time.  Will have staff place IV in AM.  Continue to monitor.  RN to notify with any changes.

## 2024-01-09 NOTE — CONSULT NOTE ADULT - SUBJECTIVE AND OBJECTIVE BOX
[facility]FECTIOUS DISEASE  Osman Garcia MD PhD, Michelle Braga MD, Tammi Zhao MD, Kenton Coy MD, Jann Acosta MD  and providing coverage with Ana Ortiz MD  Providing Infectious Disease Consultations at [facility], [facility], [facility], [facility]'s    Office# [phone] to schedule follow up appointments  Answering Service for urgent calls or New Consults [phone]  Cell# to text for urgent issues Osman Garcia [phone]     HPI:  83-year-old female PMH of DM2, anxiety/depression, chronic joint and back pain, osteoarthritis, GERD, anemia, exsmoker dementia, presents to the emergency department with family sent by urgent care by ambulance with report of pneumonia.  Patient states for the past week patient has had dry cough, generalized weakness, no appetite, at urgent care patient flu/COVID was negative, concern for patient having 101 fever, low blood pressure, and right sided lung infiltrate. Pt just came back from [place] and as per family the doctors there had stopped all her meds except Seroquel and mirtazapine , she is not on any meds for diabetes  (09 Jan 2024 07:42)      PAST MEDICAL & SURGICAL HISTORY:  Type 2 diabetes mellitus      Anxiety      OA (osteoarthritis) of knee  (Right knee)      Anemia      Other chronic pancreatitis      Anxiety and depression      Seasonal allergies      Diabetes mellitus, type 2      GERD (gastroesophageal reflux disease)      Chronic joint pain      S/P arthroscopic knee surgery  (Right knee, 2007)      History of back surgery  (2007)      S/P cholecystectomy  (2002)      S/P subtotal gastrectomy  (with gastrojejunal anastomosis, 30 years ago)      S/P knee replacement          Antimicrobials  azithromycin  IVPB 500 milliGRAM(s) IV Intermittent every 24 hours  cefTRIAXone   IVPB 1000 milliGRAM(s) IV Intermittent every 24 hours      Immunological      Other  acetaminophen     Tablet .. 650 milliGRAM(s) Oral every 6 hours PRN  albuterol/ipratropium for Nebulization 3 milliLiter(s) Nebulizer every 6 hours  dextrose 5%. 1000 milliLiter(s) IV Continuous <Continuous>  dextrose 5%. 1000 milliLiter(s) IV Continuous <Continuous>  dextrose 50% Injectable 12.5 Gram(s) IV Push once  dextrose 50% Injectable 25 Gram(s) IV Push once  dextrose 50% Injectable 25 Gram(s) IV Push once  dextrose Oral Gel 15 Gram(s) Oral once PRN  glucagon  Injectable 1 milliGRAM(s) IntraMuscular once  guaiFENesin Oral Liquid (Sugar-Free) 200 milliGRAM(s) Oral every 6 hours PRN  heparin   Injectable 5000 Unit(s) SubCutaneous every 8 hours  insulin glargine Injectable (LANTUS) 12 Unit(s) SubCutaneous every morning  insulin lispro (ADMELOG) corrective regimen sliding scale   SubCutaneous three times a day before meals  insulin lispro Injectable (ADMELOG) 4 Unit(s) SubCutaneous three times a day before meals  mirtazapine Soltab 15 milliGRAM(s) Oral at bedtime  QUEtiapine 25 milliGRAM(s) Oral at bedtime  sodium chloride 0.9%. 1000 milliLiter(s) IV Continuous <Continuous>      Allergies    No Known Allergies    Intolerances        SOCIAL HISTORY:  Social History:  neg (09 Jan 2024 07:42)      FAMILY HISTORY:  No pertinent family history in first degree relatives        ROS:    EYES:  Negative  blurry vision or double vision  GASTROINTESTINAL:  Negative for nausea, vomiting, diarrhea  -otherwise negative except for subjective    Vital Signs Last 24 Hrs  T(C): 36.9 (09 Jan 2024 08:15), Max: 37.1 (08 Jan 2024 23:57)  T(F): 98.5 (09 Jan 2024 08:15), Max: 98.7 (08 Jan 2024 23:57)  HR: 95 (09 Jan 2024 08:15) (71 - 98)  BP: 145/56 (09 Jan 2024 08:15) (105/58 - 145/56)  BP(mean): --  RR: 22 (09 Jan 2024 08:15) (16 - 22)  SpO2: 97% (09 Jan 2024 08:15) (94% - 100%)    Parameters below as of 09 Jan 2024 06:41  Patient On (Oxygen Delivery Method): room air        PE:  In no distress  HEENT:  NC, PERRL, sclerae anicteric, conjunctivae clear, EOMI.  Sinuses nontender, no nasal exudate.  No buccal or pharyngeal lesions, erythema or exudate  Neck:  Supple, no adenopathy  Lungs:  No accessory muscle use, some asymmetry on lung exam  Cor:  distant  Abd:  Symmetric, normoactive BS.  Soft, nontender, no masses, guarding or rebound.  Liver and spleen not enlarged  Extrem:  No cyanosis or edema  Skin:  No rashes.  Neuro: grossly intact  Musc: moving all limbs freely, no focal deficits        LABS:                        8.3    16.34 )-----------( 224      ( 09 Jan 2024 11:15 )             24.7       WBC Count: 16.34 K/uL (01-09-24 @ 11:15)  WBC Count: 16.10 K/uL (01-08-24 @ 22:05)      01-08    132<L>  |  100  |  44<H>  ----------------------------<  624<HH>  4.9   |  25  |  1.60<H>    Ca    8.5      08 Jan 2024 22:05    TPro  6.9  /  Alb  2.3<L>  /  TBili  0.3  /  DBili  x   /  AST  15  /  ALT  16  /  AlkPhos  112  01-08      Creatinine: 1.60 mg/dL (01-08-24 @ 22:05)      Urinalysis Basic - ( 08 Jan 2024 22:05 )    Color: x / Appearance: x / SG: x / pH: x  Gluc: 624 mg/dL / Ketone: x  / Bili: x / Urobili: x   Blood: x / Protein: x / Nitrite: x   Leuk Esterase: x / RBC: x / WBC x   Sq Epi: x / Non Sq Epi: x / Bacteria: x              MICROBIOLOGY:      RADIOLOGY & ADDITIONAL STUDIES:    --< from: CT Chest w/ IV Cont (01.08.24 @ 23:23) >    ACC: [id number] EXAM:  CT ABDOMEN AND PELVIS IC   ORDERED BY: BERTHA GAITAN     ACC: [id number] EXAM:  CT CHEST IC   ORDERED BY: BERTHA GAITAN     PROCEDURE DATE:  01/08/2024          INTERPRETATION:  CLINICAL INFORMATION: Not eating.    COMPARISON: None.    CONTRAST/COMPLICATIONS:  IV Contrast: Omnipaque 350 (accession [id number]), IV contrast documented   in unlinked concurrent exam (accession [id number])  90 cc administered   10   cc discarded  Oral Contrast: NONE  Complications: None reported at time of study completion    PROCEDURE:  CT of the Chest, Abdomen and Pelvis was performed.  Sagittal and coronal reformats were performed.    FINDINGS:  CHEST:  LUNGS AND LARGE AIRWAYS: Extensive consolidation of the lateral segment   of the right middle lobe with associated cavitary lesions; most   concerning for necrotizing pneumonia. No sizable pulmonary nodules.  PLEURA: No pleural effusion.  VESSELS: Calcified atherosclerosis of the aorta and coronary arteries.  HEART: Heart size isnormal. No pericardial effusion.  MEDIASTINUM AND SERINA: No lymphadenopathy.  CHEST WALL AND LOWER NECK: Within normal limits.    ABDOMEN AND PELVIS:  LIVER: Within normal limits.  BILE DUCTS: Intrahepatic biliary duct dilatation noted. CBD dilatation  measuring up to 1.1 cm.  GALLBLADDER: Cholecystectomy.  SPLEEN: Within normal limits.  PANCREAS: Pancreatic duct dilatation measuring up to 0.6 cm.  ADRENALS: Within normal limits.  KIDNEYS/URETERS: Within normal limits.    BLADDER: Within normal limits.  REPRODUCTIVE ORGANS: Hysterectomy.    BOWEL: No bowel obstruction. Appendix is not visualized. No evidence of   inflammation in the pericecal region.  PERITONEUM: No ascites.  VESSELS: Calcified atherosclerosis of the aorta and some of its branches.  RETROPERITONEUM/LYMPH NODES: No lymphadenopathy.  ABDOMINAL WALL: Sizable fat-containing ventral hernia.  BONES: Stable.    IMPRESSION:    1.  Necrotizing right middle lobe pneumonia.  2.  Dilated common biliary and pancreatic ducts. There is no   discrete/obvious distal obstructive lesion.    Correlation with MRCP may be useful. See above text for additional   findings, and more detailed explanations.         [facility]OUS DISEASE  Osman Garcia MD PhD, Michelle Braga MD, Tammi Zhao MD, Kenton Coy MD, Jann Acosta MD  and providing coverage with Ana Ortiz MD  Providing Infectious Disease Consultations at [facility], [facility], [facility], [facility]    Office# [phone] to schedule follow up appointments  Answering Service for urgent calls or New Consults [phone]  Cell# to text for urgent issues Osman Garcia [phone]     HPI:  83-year-old female PMH of DM2, anxiety/depression, chronic joint and back pain, osteoarthritis, GERD, anemia, exsmoker dementia, presents to the emergency department with family sent by urgent care by ambulance with report of pneumonia.  Patient states for the past week patient has had dry cough, generalized weakness, no appetite, at urgent care patient flu/COVID was negative, concern for patient having 101 fever, low blood pressure, and right sided lung infiltrate. Pt just came back from [place] and as per family the doctors there had stopped all her meds except Seroquel and mirtazapine , she is not on any meds for diabetes  (09 Jan 2024 07:42)      PAST MEDICAL & SURGICAL HISTORY:  Type 2 diabetes mellitus      Anxiety      OA (osteoarthritis) of knee  (Right knee)      Anemia      Other chronic pancreatitis      Anxiety and depression      Seasonal allergies      Diabetes mellitus, type 2      GERD (gastroesophageal reflux disease)      Chronic joint pain      S/P arthroscopic knee surgery  (Right knee, 2007)      History of back surgery  (2007)      S/P cholecystectomy  (2002)      S/P subtotal gastrectomy  (with gastrojejunal anastomosis, 30 years ago)      S/P knee replacement          Antimicrobials  azithromycin  IVPB 500 milliGRAM(s) IV Intermittent every 24 hours  cefTRIAXone   IVPB 1000 milliGRAM(s) IV Intermittent every 24 hours      Immunological      Other  acetaminophen     Tablet .. 650 milliGRAM(s) Oral every 6 hours PRN  albuterol/ipratropium for Nebulization 3 milliLiter(s) Nebulizer every 6 hours  dextrose 5%. 1000 milliLiter(s) IV Continuous <Continuous>  dextrose 5%. 1000 milliLiter(s) IV Continuous <Continuous>  dextrose 50% Injectable 12.5 Gram(s) IV Push once  dextrose 50% Injectable 25 Gram(s) IV Push once  dextrose 50% Injectable 25 Gram(s) IV Push once  dextrose Oral Gel 15 Gram(s) Oral once PRN  glucagon  Injectable 1 milliGRAM(s) IntraMuscular once  guaiFENesin Oral Liquid (Sugar-Free) 200 milliGRAM(s) Oral every 6 hours PRN  heparin   Injectable 5000 Unit(s) SubCutaneous every 8 hours  insulin glargine Injectable (LANTUS) 12 Unit(s) SubCutaneous every morning  insulin lispro (ADMELOG) corrective regimen sliding scale   SubCutaneous three times a day before meals  insulin lispro Injectable (ADMELOG) 4 Unit(s) SubCutaneous three times a day before meals  mirtazapine Soltab 15 milliGRAM(s) Oral at bedtime  QUEtiapine 25 milliGRAM(s) Oral at bedtime  sodium chloride 0.9%. 1000 milliLiter(s) IV Continuous <Continuous>      Allergies    No Known Allergies    Intolerances        SOCIAL HISTORY:  Social History:  neg (09 Jan 2024 07:42)      FAMILY HISTORY:  No pertinent family history in first degree relatives        ROS:    EYES:  Negative  blurry vision or double vision  GASTROINTESTINAL:  Negative for nausea, vomiting, diarrhea  -otherwise negative except for subjective    Vital Signs Last 24 Hrs  T(C): 36.9 (09 Jan 2024 08:15), Max: 37.1 (08 Jan 2024 23:57)  T(F): 98.5 (09 Jan 2024 08:15), Max: 98.7 (08 Jan 2024 23:57)  HR: 95 (09 Jan 2024 08:15) (71 - 98)  BP: 145/56 (09 Jan 2024 08:15) (105/58 - 145/56)  BP(mean): --  RR: 22 (09 Jan 2024 08:15) (16 - 22)  SpO2: 97% (09 Jan 2024 08:15) (94% - 100%)    Parameters below as of 09 Jan 2024 06:41  Patient On (Oxygen Delivery Method): room air        PE:  In no distress  HEENT:  NC, PERRL, sclerae anicteric, conjunctivae clear, EOMI.  Sinuses nontender, no nasal exudate.  No buccal or pharyngeal lesions, erythema or exudate  Neck:  Supple, no adenopathy  Lungs:  No accessory muscle use, some asymmetry on lung exam  Cor:  distant  Abd:  Symmetric, normoactive BS.  Soft, nontender, no masses, guarding or rebound.  Liver and spleen not enlarged  Extrem:  No cyanosis or edema  Skin:  No rashes.  Neuro: grossly intact  Musc: moving all limbs freely, no focal deficits        LABS:                        8.3    16.34 )-----------( 224      ( 09 Jan 2024 11:15 )             24.7       WBC Count: 16.34 K/uL (01-09-24 @ 11:15)  WBC Count: 16.10 K/uL (01-08-24 @ 22:05)      01-08    132<L>  |  100  |  44<H>  ----------------------------<  624<HH>  4.9   |  25  |  1.60<H>    Ca    8.5      08 Jan 2024 22:05    TPro  6.9  /  Alb  2.3<L>  /  TBili  0.3  /  DBili  x   /  AST  15  /  ALT  16  /  AlkPhos  112  01-08      Creatinine: 1.60 mg/dL (01-08-24 @ 22:05)      Urinalysis Basic - ( 08 Jan 2024 22:05 )    Color: x / Appearance: x / SG: x / pH: x  Gluc: 624 mg/dL / Ketone: x  / Bili: x / Urobili: x   Blood: x / Protein: x / Nitrite: x   Leuk Esterase: x / RBC: x / WBC x   Sq Epi: x / Non Sq Epi: x / Bacteria: x              MICROBIOLOGY:      RADIOLOGY & ADDITIONAL STUDIES:    --< from: CT Chest w/ IV Cont (01.08.24 @ 23:23) >    ACC: [id number] EXAM:  CT ABDOMEN AND PELVIS IC   ORDERED BY: BERTHA GAITAN     ACC: [id number] EXAM:  CT CHEST IC   ORDERED BY: BERTHA GAITAN     PROCEDURE DATE:  01/08/2024          INTERPRETATION:  CLINICAL INFORMATION: Not eating.    COMPARISON: None.    CONTRAST/COMPLICATIONS:  IV Contrast: Omnipaque 350 (accession [id number]), IV contrast documented   in unlinked concurrent exam (accession [id number])  90 cc administered   10   cc discarded  Oral Contrast: NONE  Complications: None reported at time of study completion    PROCEDURE:  CT of the Chest, Abdomen and Pelvis was performed.  Sagittal and coronal reformats were performed.    FINDINGS:  CHEST:  LUNGS AND LARGE AIRWAYS: Extensive consolidation of the lateral segment   of the right middle lobe with associated cavitary lesions; most   concerning for necrotizing pneumonia. No sizable pulmonary nodules.  PLEURA: No pleural effusion.  VESSELS: Calcified atherosclerosis of the aorta and coronary arteries.  HEART: Heart size isnormal. No pericardial effusion.  MEDIASTINUM AND SERINA: No lymphadenopathy.  CHEST WALL AND LOWER NECK: Within normal limits.    ABDOMEN AND PELVIS:  LIVER: Within normal limits.  BILE DUCTS: Intrahepatic biliary duct dilatation noted. CBD dilatation  measuring up to 1.1 cm.  GALLBLADDER: Cholecystectomy.  SPLEEN: Within normal limits.  PANCREAS: Pancreatic duct dilatation measuring up to 0.6 cm.  ADRENALS: Within normal limits.  KIDNEYS/URETERS: Within normal limits.    BLADDER: Within normal limits.  REPRODUCTIVE ORGANS: Hysterectomy.    BOWEL: No bowel obstruction. Appendix is not visualized. No evidence of   inflammation in the pericecal region.  PERITONEUM: No ascites.  VESSELS: Calcified atherosclerosis of the aorta and some of its branches.  RETROPERITONEUM/LYMPH NODES: No lymphadenopathy.  ABDOMINAL WALL: Sizable fat-containing ventral hernia.  BONES: Stable.    IMPRESSION:    1.  Necrotizing right middle lobe pneumonia.  2.  Dilated common biliary and pancreatic ducts. There is no   discrete/obvious distal obstructive lesion.    Correlation with MRCP may be useful. See above text for additional   findings, and more detailed explanations.

## 2024-01-09 NOTE — CONSULT NOTE ADULT - ASSESSMENT
Physical Exam:   Vital Signs Last 24 Hrs  T(C): 36.9 (09 Jan 2024 15:38), Max: 37.1 (08 Jan 2024 23:57)  T(F): 98.4 (09 Jan 2024 15:38), Max: 98.7 (08 Jan 2024 23:57)  HR: 91 (09 Jan 2024 15:38) (71 - 98)  BP: 139/60 (09 Jan 2024 15:38) (105/58 - 145/56)  BP(mean): --  RR: 18 (09 Jan 2024 15:38) (16 - 22)  SpO2: 99% (09 Jan 2024 15:38) (94% - 100%)    Parameters below as of 09 Jan 2024 15:38  Patient On (Oxygen Delivery Method): room air             CAPILLARY BLOOD GLUCOSE      POCT Blood Glucose.: 228 mg/dL (09 Jan 2024 10:49)  POCT Blood Glucose.: 174 mg/dL (09 Jan 2024 09:37)  POCT Blood Glucose.: 217 mg/dL (09 Jan 2024 06:20)  POCT Blood Glucose.: 307 mg/dL (09 Jan 2024 03:59)  POCT Blood Glucose.: 546 mg/dL (09 Jan 2024 01:13)      Cholesterol, Serum: 113 mg/dL (05.19.21 @ 08:36)     HDL Cholesterol, Serum: 22 mg/dL (05.19.21 @ 08:36)     LDL Cholesterol Calculated: 66 mg/dL (05.19.21 @ 08:36)     DIET: CC  >50%

## 2024-01-09 NOTE — H&P ADULT - ASSESSMENT
83-year-old female PMH of DM2, anxiety/depression, chronic joint and back pain, osteoarthritis, GERD, anemia, exsmoker dementia, presents to the emergency department with son sent by urgent care by ambulance with report of pneumonia.  Patient states for the past week patient has had dry cough, generalized weakness, no appetite, at urgent care patient flu/COVID was negative, concern for patient having 101 fever, low blood pressure, and right sided lung infiltrate.  Pt just came back from [place] and as per son the doctors there had stopped all her meds except Seroquel and mirtazapine , she is not on any meds for diabetes     1 Sepsis sec to pneumonia POA  - cw ceftriaxone  - cw azithromycin  - fu cultures  - check legionella  - pulmonary fu     2 Uncontrolled DM  - pt not on any meds now as per son  - will start basal, bolus  - monitor FS    3 Dementia  - cw mirtazapine  - cw Seroquel    4 Lovenox for DVT prophylaxis

## 2024-01-09 NOTE — CONSULT NOTE ADULT - SUBJECTIVE AND OBJECTIVE BOX
[facility] [place] [facility] Consult - SalvadorAb coker Tsiakos [phone]    CHIEF COMPLAINT: Patient is a 83y old  Female who presents with a chief complaint of fever (09 Jan 2024 07:58)      HPI:  83-year-old female PMH of DM2, anxiety/depression, chronic joint and back pain, osteoarthritis, GERD, anemia, exsmoker dementia, presents to the emergency department with son sent by urgent care by ambulance with report of pneumonia.  Patient states for the past week patient has had dry cough, generalized weakness, no appetite, at urgent care patient flu/COVID was negative, concern for patient having 101 fever, low blood pressure, and right sided lung infiltrate.  Pt just came back from [place] and as per son the doctors there had stopped all her meds except Seroquel and mirtazapine , she is not on any meds for diabetes  (09 Jan 2024 07:42)      PAST MEDICAL & SURGICAL HISTORY:  Type 2 diabetes mellitus      Anxiety      OA (osteoarthritis) of knee  (Right knee)      Anemia      Other chronic pancreatitis      Anxiety and depression      Seasonal allergies      Diabetes mellitus, type 2      GERD (gastroesophageal reflux disease)      Chronic joint pain      S/P arthroscopic knee surgery  (Right knee, 2007)      History of back surgery  (2007)      S/P cholecystectomy  (2002)      S/P subtotal gastrectomy  (with gastrojejunal anastomosis, 30 years ago)      S/P knee replacement          SOCIAL HISTORY: Alochol: Denied  Smoking: Nonsmoker  Drug Use: Denied  Marital Status:         FAMILY HISTORY: FAMILY HISTORY:  No pertinent family history in first degree relatives        MEDICATIONS  (STANDING):  albuterol/ipratropium for Nebulization 3 milliLiter(s) Nebulizer every 6 hours  azithromycin  IVPB 500 milliGRAM(s) IV Intermittent every 24 hours  cefTRIAXone   IVPB 1000 milliGRAM(s) IV Intermittent every 24 hours  dextrose 5%. 1000 milliLiter(s) (50 mL/Hr) IV Continuous <Continuous>  dextrose 5%. 1000 milliLiter(s) (100 mL/Hr) IV Continuous <Continuous>  dextrose 50% Injectable 12.5 Gram(s) IV Push once  dextrose 50% Injectable 25 Gram(s) IV Push once  dextrose 50% Injectable 25 Gram(s) IV Push once  glucagon  Injectable 1 milliGRAM(s) IntraMuscular once  heparin   Injectable 5000 Unit(s) SubCutaneous every 8 hours  insulin glargine Injectable (LANTUS) 12 Unit(s) SubCutaneous every morning  insulin lispro (ADMELOG) corrective regimen sliding scale   SubCutaneous three times a day before meals  insulin lispro Injectable (ADMELOG) 4 Unit(s) SubCutaneous three times a day before meals  mirtazapine Soltab 15 milliGRAM(s) Oral at bedtime  QUEtiapine 25 milliGRAM(s) Oral at bedtime  sodium chloride 0.9%. 1000 milliLiter(s) (75 mL/Hr) IV Continuous <Continuous>    MEDICATIONS  (PRN):  acetaminophen     Tablet .. 650 milliGRAM(s) Oral every 6 hours PRN Temp greater or equal to 38C (100.4F), Mild Pain (1 - 3)  dextrose Oral Gel 15 Gram(s) Oral once PRN Blood Glucose LESS THAN 70 milliGRAM(s)/deciliter  guaiFENesin Oral Liquid (Sugar-Free) 200 milliGRAM(s) Oral every 6 hours PRN Cough      Allergies    No Known Allergies    Intolerances        REVIEW OF SYSTEMS:  CONSTITUTIONAL: No weakness, fevers or chills  EYES/ENT: No visual changes;  No vertigo or throat pain   NECK: No pain or stiffness  RESPIRATORY: No cough, wheezing, hemoptysis; No shortness of breath  CARDIOVASCULAR: No chest pain or palpitations  GASTROINTESTINAL: No abdominal pain. No nausea, vomiting, or hematemesis; No diarrhea or constipation. No melena or hematochezia.  GENITOURINARY: No dysuria, frequency or hematuria  NEUROLOGICAL: No numbness or weakness  SKIN: No itching or rash  All other review of systems is negative unless indicated above    VITAL SIGNS:   Vital Signs Last 24 Hrs  T(C): 36.9 (09 Jan 2024 08:15), Max: 37.1 (08 Jan 2024 23:57)  T(F): 98.5 (09 Jan 2024 08:15), Max: 98.7 (08 Jan 2024 23:57)  HR: 95 (09 Jan 2024 08:15) (71 - 98)  BP: 145/56 (09 Jan 2024 08:15) (105/58 - 145/56)  BP(mean): --  RR: 22 (09 Jan 2024 08:15) (16 - 22)  SpO2: 97% (09 Jan 2024 08:15) (94% - 100%)    Parameters below as of 09 Jan 2024 06:41  Patient On (Oxygen Delivery Method): room air        I&O's Summary      PHYSICAL EXAM:  Constitutional: Awake in NAD  HEENT:  FRANCES, EOMI  Neck: No JVD  Pulmonary: CTA B/L No R/R/W  Cardiovascular: PMI not palpable non-displaced Regular S1 and S2, no murmurs  Gastrointestinal: Bowel Sounds present, soft, nontender.   Extremities: Trace peripheral edema.   Neuro: No gross focal deficits    LABS: All Labs Reviewed:                        6.8    16.10 )-----------( 246      ( 08 Jan 2024 22:05 )             19.9     08 Jan 2024 22:05    132    |  100    |  44     ----------------------------<  624    4.9     |  25     |  1.60     Ca    8.5        08 Jan 2024 22:05    TPro  6.9    /  Alb  2.3    /  TBili  0.3    /  DBili  x      /  AST  15     /  ALT  16     /  AlkPhos  112    08 Jan 2024 22:05    PT/INR - ( 08 Jan 2024 22:05 )   PT: 12.4 sec;   INR: 1.06 ratio         PTT - ( 08 Jan 2024 22:05 )  PTT:30.2 sec      Blood Culture:         RADIOLOGY/EKG:     [facility] [place] [facility] - SalvadorAb coker [place] [phone]    CHIEF COMPLAINT: Patient is a 83y old  Female who presents with a chief complaint of fever (09 Jan 2024 07:58)      HPI:  83-year-old female PMH of DM2, anxiety/depression, chronic joint and back pain, osteoarthritis, GERD, anemia, exsmoker dementia, presents to the emergency department with son sent by urgent care by ambulance with report of pneumonia.  Patient states for the past week patient has had dry cough, generalized weakness, no appetite, at urgent care patient flu/COVID was negative, concern for patient having 101 fever, low blood pressure, and right sided lung infiltrate.  Pt just came back from [place] and as per son the doctors there had stopped all her meds except Seroquel and mirtazapine , she is not on any meds for diabetes  (09 Jan 2024 07:42)      PAST MEDICAL & SURGICAL HISTORY:  Type 2 diabetes mellitus      Anxiety      OA (osteoarthritis) of knee  (Right knee)      Anemia      Other chronic pancreatitis      Anxiety and depression      Seasonal allergies      Diabetes mellitus, type 2      GERD (gastroesophageal reflux disease)      Chronic joint pain      S/P arthroscopic knee surgery  (Right knee, 2007)      History of back surgery  (2007)      S/P cholecystectomy  (2002)      S/P subtotal gastrectomy  (with gastrojejunal anastomosis, 30 years ago)      S/P knee replacement          SOCIAL HISTORY: Alochol: Denied  Smoking: Nonsmoker  Drug Use: Denied  Marital Status:         FAMILY HISTORY: FAMILY HISTORY:  No pertinent family history in first degree relatives        MEDICATIONS  (STANDING):  albuterol/ipratropium for Nebulization 3 milliLiter(s) Nebulizer every 6 hours  azithromycin  IVPB 500 milliGRAM(s) IV Intermittent every 24 hours  cefTRIAXone   IVPB 1000 milliGRAM(s) IV Intermittent every 24 hours  dextrose 5%. 1000 milliLiter(s) (50 mL/Hr) IV Continuous <Continuous>  dextrose 5%. 1000 milliLiter(s) (100 mL/Hr) IV Continuous <Continuous>  dextrose 50% Injectable 12.5 Gram(s) IV Push once  dextrose 50% Injectable 25 Gram(s) IV Push once  dextrose 50% Injectable 25 Gram(s) IV Push once  glucagon  Injectable 1 milliGRAM(s) IntraMuscular once  heparin   Injectable 5000 Unit(s) SubCutaneous every 8 hours  insulin glargine Injectable (LANTUS) 12 Unit(s) SubCutaneous every morning  insulin lispro (ADMELOG) corrective regimen sliding scale   SubCutaneous three times a day before meals  insulin lispro Injectable (ADMELOG) 4 Unit(s) SubCutaneous three times a day before meals  mirtazapine Soltab 15 milliGRAM(s) Oral at bedtime  QUEtiapine 25 milliGRAM(s) Oral at bedtime  sodium chloride 0.9%. 1000 milliLiter(s) (75 mL/Hr) IV Continuous <Continuous>    MEDICATIONS  (PRN):  acetaminophen     Tablet .. 650 milliGRAM(s) Oral every 6 hours PRN Temp greater or equal to 38C (100.4F), Mild Pain (1 - 3)  dextrose Oral Gel 15 Gram(s) Oral once PRN Blood Glucose LESS THAN 70 milliGRAM(s)/deciliter  guaiFENesin Oral Liquid (Sugar-Free) 200 milliGRAM(s) Oral every 6 hours PRN Cough      Allergies    No Known Allergies    Intolerances        REVIEW OF SYSTEMS:  CONSTITUTIONAL: No weakness, fevers or chills  EYES/ENT: No visual changes;  No vertigo or throat pain   NECK: No pain or stiffness  RESPIRATORY: No cough, wheezing, hemoptysis; No shortness of breath  CARDIOVASCULAR: No chest pain or palpitations  GASTROINTESTINAL: No abdominal pain. No nausea, vomiting, or hematemesis; No diarrhea or constipation. No melena or hematochezia.  GENITOURINARY: No dysuria, frequency or hematuria  NEUROLOGICAL: No numbness or weakness  SKIN: No itching or rash  All other review of systems is negative unless indicated above    VITAL SIGNS:   Vital Signs Last 24 Hrs  T(C): 36.9 (09 Jan 2024 08:15), Max: 37.1 (08 Jan 2024 23:57)  T(F): 98.5 (09 Jan 2024 08:15), Max: 98.7 (08 Jan 2024 23:57)  HR: 95 (09 Jan 2024 08:15) (71 - 98)  BP: 145/56 (09 Jan 2024 08:15) (105/58 - 145/56)  BP(mean): --  RR: 22 (09 Jan 2024 08:15) (16 - 22)  SpO2: 97% (09 Jan 2024 08:15) (94% - 100%)    Parameters below as of 09 Jan 2024 06:41  Patient On (Oxygen Delivery Method): room air        I&O's Summary      PHYSICAL EXAM:  Constitutional: Awake in NAD  HEENT:  FRANCES, EOMI  Neck: No JVD  Pulmonary: CTA B/L No R/R/W  Cardiovascular: PMI not palpable non-displaced Regular S1 and S2, no murmurs  Gastrointestinal: Bowel Sounds present, soft, nontender.   Extremities: Trace peripheral edema.   Neuro: No gross focal deficits    LABS: All Labs Reviewed:                        6.8    16.10 )-----------( 246      ( 08 Jan 2024 22:05 )             19.9     08 Jan 2024 22:05    132    |  100    |  44     ----------------------------<  624    4.9     |  25     |  1.60     Ca    8.5        08 Jan 2024 22:05    TPro  6.9    /  Alb  2.3    /  TBili  0.3    /  DBili  x      /  AST  15     /  ALT  16     /  AlkPhos  112    08 Jan 2024 22:05    PT/INR - ( 08 Jan 2024 22:05 )   PT: 12.4 sec;   INR: 1.06 ratio         PTT - ( 08 Jan 2024 22:05 )  PTT:30.2 sec      Blood Culture:         RADIOLOGY/EKG:     [facility] Consult - SalvadorAb coker [place] [phone]    CHIEF COMPLAINT: Patient is a 83y old  Female who presents with a chief complaint of fever (09 Jan 2024 07:58)      HPI:  83-year-old female PMH of DM2, anxiety/depression, chronic joint and back pain, osteoarthritis, GERD, anemia, exsmoker dementia, presents to the emergency department with son sent by urgent care by ambulance with report of pneumonia.  Patient states for the past week patient has had dry cough, generalized weakness, no appetite, at urgent care patient flu/COVID was negative, concern for patient having 101 fever, low blood pressure, and right sided lung infiltrate.  Pt just came back from [place] and as per son the doctors there had stopped all her meds except Seroquel and mirtazapine , she is not on any meds for diabetes  (09 Jan 2024 07:42)      PAST MEDICAL & SURGICAL HISTORY:  Type 2 diabetes mellitus      Anxiety      OA (osteoarthritis) of knee  (Right knee)      Anemia      Other chronic pancreatitis      Anxiety and depression      Seasonal allergies      Diabetes mellitus, type 2      GERD (gastroesophageal reflux disease)      Chronic joint pain      S/P arthroscopic knee surgery  (Right knee, 2007)      History of back surgery  (2007)      S/P cholecystectomy  (2002)      S/P subtotal gastrectomy  (with gastrojejunal anastomosis, 30 years ago)      S/P knee replacement          SOCIAL HISTORY: Alochol: Denied  Smoking: Nonsmoker  Drug Use: Denied  Marital Status:         FAMILY HISTORY: FAMILY HISTORY:  No pertinent family history in first degree relatives        MEDICATIONS  (STANDING):  albuterol/ipratropium for Nebulization 3 milliLiter(s) Nebulizer every 6 hours  azithromycin  IVPB 500 milliGRAM(s) IV Intermittent every 24 hours  cefTRIAXone   IVPB 1000 milliGRAM(s) IV Intermittent every 24 hours  dextrose 5%. 1000 milliLiter(s) (50 mL/Hr) IV Continuous <Continuous>  dextrose 5%. 1000 milliLiter(s) (100 mL/Hr) IV Continuous <Continuous>  dextrose 50% Injectable 12.5 Gram(s) IV Push once  dextrose 50% Injectable 25 Gram(s) IV Push once  dextrose 50% Injectable 25 Gram(s) IV Push once  glucagon  Injectable 1 milliGRAM(s) IntraMuscular once  heparin   Injectable 5000 Unit(s) SubCutaneous every 8 hours  insulin glargine Injectable (LANTUS) 12 Unit(s) SubCutaneous every morning  insulin lispro (ADMELOG) corrective regimen sliding scale   SubCutaneous three times a day before meals  insulin lispro Injectable (ADMELOG) 4 Unit(s) SubCutaneous three times a day before meals  mirtazapine Soltab 15 milliGRAM(s) Oral at bedtime  QUEtiapine 25 milliGRAM(s) Oral at bedtime  sodium chloride 0.9%. 1000 milliLiter(s) (75 mL/Hr) IV Continuous <Continuous>    MEDICATIONS  (PRN):  acetaminophen     Tablet .. 650 milliGRAM(s) Oral every 6 hours PRN Temp greater or equal to 38C (100.4F), Mild Pain (1 - 3)  dextrose Oral Gel 15 Gram(s) Oral once PRN Blood Glucose LESS THAN 70 milliGRAM(s)/deciliter  guaiFENesin Oral Liquid (Sugar-Free) 200 milliGRAM(s) Oral every 6 hours PRN Cough      Allergies    No Known Allergies    Intolerances        REVIEW OF SYSTEMS:  CONSTITUTIONAL: No weakness, fevers or chills  EYES/ENT: No visual changes;  No vertigo or throat pain   NECK: No pain or stiffness  RESPIRATORY: No cough, wheezing, hemoptysis; No shortness of breath  CARDIOVASCULAR: No chest pain or palpitations  GASTROINTESTINAL: No abdominal pain. No nausea, vomiting, or hematemesis; No diarrhea or constipation. No melena or hematochezia.  GENITOURINARY: No dysuria, frequency or hematuria  NEUROLOGICAL: No numbness or weakness  SKIN: No itching or rash  All other review of systems is negative unless indicated above    VITAL SIGNS:   Vital Signs Last 24 Hrs  T(C): 36.9 (09 Jan 2024 08:15), Max: 37.1 (08 Jan 2024 23:57)  T(F): 98.5 (09 Jan 2024 08:15), Max: 98.7 (08 Jan 2024 23:57)  HR: 95 (09 Jan 2024 08:15) (71 - 98)  BP: 145/56 (09 Jan 2024 08:15) (105/58 - 145/56)  BP(mean): --  RR: 22 (09 Jan 2024 08:15) (16 - 22)  SpO2: 97% (09 Jan 2024 08:15) (94% - 100%)    Parameters below as of 09 Jan 2024 06:41  Patient On (Oxygen Delivery Method): room air        I&O's Summary      PHYSICAL EXAM:  Constitutional: Awake in NAD  HEENT:  FRANCES, EOMI  Neck: No JVD  Pulmonary: CTA B/L No R/R/W  Cardiovascular: PMI not palpable non-displaced Regular S1 and S2, no murmurs  Gastrointestinal: Bowel Sounds present, soft, nontender.   Extremities: Trace peripheral edema.   Neuro: No gross focal deficits    LABS: All Labs Reviewed:                        6.8    16.10 )-----------( 246      ( 08 Jan 2024 22:05 )             19.9     08 Jan 2024 22:05    132    |  100    |  44     ----------------------------<  624    4.9     |  25     |  1.60     Ca    8.5        08 Jan 2024 22:05    TPro  6.9    /  Alb  2.3    /  TBili  0.3    /  DBili  x      /  AST  15     /  ALT  16     /  AlkPhos  112    08 Jan 2024 22:05    PT/INR - ( 08 Jan 2024 22:05 )   PT: 12.4 sec;   INR: 1.06 ratio         PTT - ( 08 Jan 2024 22:05 )  PTT:30.2 sec      Blood Culture:         RADIOLOGY/EKG:    < from: CT Chest w/ IV Cont (01.08.24 @ 23:23) >  IMPRESSION:    1.  Necrotizing right middle lobe pneumonia.  2.  Dilated common biliary and pancreatic ducts. There is no   discrete/obvious distal obstructive lesion.    < end of copied text >   [facility] Consult - SalvadorAb coker [place] [phone]    CHIEF COMPLAINT: Patient is a 83y old  Female who presents with a chief complaint of fever (09 Jan 2024 07:58)      HPI:  83-year-old female PMH of DM2, anxiety/depression, chronic joint and back pain, osteoarthritis, GERD, anemia, exsmoker dementia, presents to the emergency department with son sent by urgent care by ambulance with report of pneumonia.  Patient states for the past week patient has had dry cough, generalized weakness, no appetite, at urgent care patient flu/COVID was negative, concern for patient having 101 fever, low blood pressure, and right sided lung infiltrate.  Pt just came back from [place] and as per son the doctors there had stopped all her meds except Seroquel and mirtazapine , she is not on any meds for diabetes  (09 Jan 2024 07:42)      PAST MEDICAL & SURGICAL HISTORY:  Type 2 diabetes mellitus      Anxiety      OA (osteoarthritis) of knee  (Right knee)      Anemia      Other chronic pancreatitis      Anxiety and depression      Seasonal allergies      Diabetes mellitus, type 2      GERD (gastroesophageal reflux disease)      Chronic joint pain      S/P arthroscopic knee surgery  (Right knee, 2007)      History of back surgery  (2007)      S/P cholecystectomy  (2002)      S/P subtotal gastrectomy  (with gastrojejunal anastomosis, 30 years ago)      S/P knee replacement          SOCIAL HISTORY: Alochol: Denied  Smoking: Nonsmoker  Drug Use: Denied  Marital Status:         FAMILY HISTORY: FAMILY HISTORY:  No pertinent family history in first degree relatives        MEDICATIONS  (STANDING):  albuterol/ipratropium for Nebulization 3 milliLiter(s) Nebulizer every 6 hours  azithromycin  IVPB 500 milliGRAM(s) IV Intermittent every 24 hours  cefTRIAXone   IVPB 1000 milliGRAM(s) IV Intermittent every 24 hours  dextrose 5%. 1000 milliLiter(s) (50 mL/Hr) IV Continuous <Continuous>  dextrose 5%. 1000 milliLiter(s) (100 mL/Hr) IV Continuous <Continuous>  dextrose 50% Injectable 12.5 Gram(s) IV Push once  dextrose 50% Injectable 25 Gram(s) IV Push once  dextrose 50% Injectable 25 Gram(s) IV Push once  glucagon  Injectable 1 milliGRAM(s) IntraMuscular once  heparin   Injectable 5000 Unit(s) SubCutaneous every 8 hours  insulin glargine Injectable (LANTUS) 12 Unit(s) SubCutaneous every morning  insulin lispro (ADMELOG) corrective regimen sliding scale   SubCutaneous three times a day before meals  insulin lispro Injectable (ADMELOG) 4 Unit(s) SubCutaneous three times a day before meals  mirtazapine Soltab 15 milliGRAM(s) Oral at bedtime  QUEtiapine 25 milliGRAM(s) Oral at bedtime  sodium chloride 0.9%. 1000 milliLiter(s) (75 mL/Hr) IV Continuous <Continuous>    MEDICATIONS  (PRN):  acetaminophen     Tablet .. 650 milliGRAM(s) Oral every 6 hours PRN Temp greater or equal to 38C (100.4F), Mild Pain (1 - 3)  dextrose Oral Gel 15 Gram(s) Oral once PRN Blood Glucose LESS THAN 70 milliGRAM(s)/deciliter  guaiFENesin Oral Liquid (Sugar-Free) 200 milliGRAM(s) Oral every 6 hours PRN Cough      Allergies    No Known Allergies    Intolerances        REVIEW OF SYSTEMS:  CONSTITUTIONAL: No weakness, fevers or chills  EYES/ENT: No visual changes;  No vertigo or throat pain   NECK: No pain or stiffness  RESPIRATORY: No cough, wheezing, hemoptysis; No shortness of breath  CARDIOVASCULAR: No chest pain or palpitations  GASTROINTESTINAL: No abdominal pain. No nausea, vomiting, or hematemesis; No diarrhea or constipation. No melena or hematochezia.  GENITOURINARY: No dysuria, frequency or hematuria  NEUROLOGICAL: No numbness or weakness  SKIN: No itching or rash  All other review of systems is negative unless indicated above    VITAL SIGNS:   Vital Signs Last 24 Hrs  T(C): 36.9 (09 Jan 2024 08:15), Max: 37.1 (08 Jan 2024 23:57)  T(F): 98.5 (09 Jan 2024 08:15), Max: 98.7 (08 Jan 2024 23:57)  HR: 95 (09 Jan 2024 08:15) (71 - 98)  BP: 145/56 (09 Jan 2024 08:15) (105/58 - 145/56)  BP(mean): --  RR: 22 (09 Jan 2024 08:15) (16 - 22)  SpO2: 97% (09 Jan 2024 08:15) (94% - 100%)    Parameters below as of 09 Jan 2024 06:41  Patient On (Oxygen Delivery Method): room air        I&O's Summary      PHYSICAL EXAM:  Constitutional: Awake in NAD  HEENT:  FRANCES, EOMI  Neck: No JVD  Pulmonary: CTA B/L No R/R/W  Cardiovascular: PMI not palpable non-displaced Regular S1 and S2, no murmurs  Gastrointestinal: Bowel Sounds present, soft, nontender.   Extremities: Trace peripheral edema.   Neuro: No gross focal deficits    LABS: All Labs Reviewed:                        6.8    16.10 )-----------( 246      ( 08 Jan 2024 22:05 )             19.9     08 Jan 2024 22:05    132    |  100    |  44     ----------------------------<  624    4.9     |  25     |  1.60     Ca    8.5        08 Jan 2024 22:05    TPro  6.9    /  Alb  2.3    /  TBili  0.3    /  DBili  x      /  AST  15     /  ALT  16     /  AlkPhos  112    08 Jan 2024 22:05    PT/INR - ( 08 Jan 2024 22:05 )   PT: 12.4 sec;   INR: 1.06 ratio         PTT - ( 08 Jan 2024 22:05 )  PTT:30.2 sec      Blood Culture:         RADIOLOGY/EKG:    < from: CT Chest w/ IV Cont (01.08.24 @ 23:23) >  IMPRESSION:    1.  Necrotizing right middle lobe pneumonia.  2.  Dilated common biliary and pancreatic ducts. There is no   discrete/obvious distal obstructive lesion.    < end of copied text >

## 2024-01-09 NOTE — CONSULT NOTE ADULT - ASSESSMENT
83-year-old female PMH of DM2, anxiety/depression, chronic joint and back pain, osteoarthritis, GERD, anemia, ex-smoker dementia, presents to the emergency department with son sent by urgent care by ambulance with report of pneumonia.    pna  necrotizing pna?  anxiety  depression  OP  OA  DM  GERD  Anemia  Dementia  Ex smoker    ID eval  CT chest reviewed  PNA eval - necrotizing pna reported on CT  biomarkers  cx -   sputum -   monitor VS and HD and Sat  legionella - strep Ag -   discussion about TB risk  cough rx regimen  bronchodilators for cough assist and mucociliary clearance  goal sat > 88 pct  Pall Eval - GOC discussion

## 2024-01-09 NOTE — PATIENT PROFILE ADULT - FUNCTIONAL ASSESSMENT - BASIC MOBILITY 6.
2-calculated by average/Not able to assess (calculate score using [facility] averaging method)  2-calculated by average/Not able to assess (calculate score using [facility] averaging method)

## 2024-01-09 NOTE — CONSULT NOTE ADULT - ASSESSMENT
83F with DM2, GERD, dementia who presents with SOB, fever, cough and generalized weakness found with PNA on CT. ECG is normal.  Doubt ACS    Suggest:    1. PNA  - Treat with Abx    2. Multiple CAD risk factors  - Recommend Lipitor 20mg daily    3. DVT ppx    4. Will follow

## 2024-01-09 NOTE — ED ADULT NURSE REASSESSMENT NOTE - NS ED NURSE REASSESS COMMENT FT1
dixie Arthur at bedside. pt receiving blood transfusion. IV site clean dry and intact, #20g left forearm. dixie Arthur at bedside. pt receiving blood transfusion. IV site clean dry and intact, #20g left forearm.  Dr. Kemp made aware of  (2hr post admelog).

## 2024-01-10 LAB
A1C WITH ESTIMATED AVERAGE GLUCOSE RESULT: 9.8 % — HIGH (ref 4–5.6)
A1C WITH ESTIMATED AVERAGE GLUCOSE RESULT: 9.8 % — HIGH (ref 4–5.6)
APPEARANCE UR: CLEAR — SIGNIFICANT CHANGE UP
APPEARANCE UR: CLEAR — SIGNIFICANT CHANGE UP
BACTERIA # UR AUTO: ABNORMAL /HPF
BACTERIA # UR AUTO: ABNORMAL /HPF
BILIRUB UR-MCNC: NEGATIVE — SIGNIFICANT CHANGE UP
BILIRUB UR-MCNC: NEGATIVE — SIGNIFICANT CHANGE UP
COLOR SPEC: YELLOW — SIGNIFICANT CHANGE UP
COLOR SPEC: YELLOW — SIGNIFICANT CHANGE UP
DIFF PNL FLD: ABNORMAL
DIFF PNL FLD: ABNORMAL
EPI CELLS # UR: PRESENT
EPI CELLS # UR: PRESENT
ESTIMATED AVERAGE GLUCOSE: 235 MG/DL — HIGH (ref 68–114)
ESTIMATED AVERAGE GLUCOSE: 235 MG/DL — HIGH (ref 68–114)
GLUCOSE UR QL: 500 MG/DL
GLUCOSE UR QL: 500 MG/DL
KETONES UR-MCNC: NEGATIVE MG/DL — SIGNIFICANT CHANGE UP
KETONES UR-MCNC: NEGATIVE MG/DL — SIGNIFICANT CHANGE UP
LEGIONELLA AG UR QL: NEGATIVE — SIGNIFICANT CHANGE UP
LEGIONELLA AG UR QL: NEGATIVE — SIGNIFICANT CHANGE UP
LEUKOCYTE ESTERASE UR-ACNC: ABNORMAL
LEUKOCYTE ESTERASE UR-ACNC: ABNORMAL
MRSA PCR RESULT.: SIGNIFICANT CHANGE UP
MRSA PCR RESULT.: SIGNIFICANT CHANGE UP
NITRITE UR-MCNC: NEGATIVE — SIGNIFICANT CHANGE UP
NITRITE UR-MCNC: NEGATIVE — SIGNIFICANT CHANGE UP
PH UR: 6 — SIGNIFICANT CHANGE UP (ref 5–8)
PH UR: 6 — SIGNIFICANT CHANGE UP (ref 5–8)
PROT UR-MCNC: 100 MG/DL
PROT UR-MCNC: 100 MG/DL
RBC CASTS # UR COMP ASSIST: 4 /HPF — SIGNIFICANT CHANGE UP (ref 0–4)
RBC CASTS # UR COMP ASSIST: 4 /HPF — SIGNIFICANT CHANGE UP (ref 0–4)
S AUREUS DNA NOSE QL NAA+PROBE: SIGNIFICANT CHANGE UP
S AUREUS DNA NOSE QL NAA+PROBE: SIGNIFICANT CHANGE UP
S PNEUM AG UR QL: POSITIVE
S PNEUM AG UR QL: POSITIVE
SP GR SPEC: 1.04 — HIGH (ref 1–1.03)
SP GR SPEC: 1.04 — HIGH (ref 1–1.03)
UROBILINOGEN FLD QL: 0.2 MG/DL — SIGNIFICANT CHANGE UP (ref 0.2–1)
UROBILINOGEN FLD QL: 0.2 MG/DL — SIGNIFICANT CHANGE UP (ref 0.2–1)
WBC UR QL: 22 /HPF — HIGH (ref 0–5)
WBC UR QL: 22 /HPF — HIGH (ref 0–5)

## 2024-01-10 RX ORDER — METRONIDAZOLE 500 MG
500 TABLET ORAL EVERY 8 HOURS
Refills: 0 | Status: DISCONTINUED | OUTPATIENT
Start: 2024-01-10 | End: 2024-01-11

## 2024-01-10 RX ORDER — CEFTRIAXONE 500 MG/1
1000 INJECTION, POWDER, FOR SOLUTION INTRAMUSCULAR; INTRAVENOUS EVERY 24 HOURS
Refills: 0 | Status: COMPLETED | OUTPATIENT
Start: 2024-01-10 | End: 2024-01-14

## 2024-01-10 RX ADMIN — Medication 4 UNIT(S): at 08:01

## 2024-01-10 RX ADMIN — HEPARIN SODIUM 5000 UNIT(S): 5000 INJECTION INTRAVENOUS; SUBCUTANEOUS at 21:23

## 2024-01-10 RX ADMIN — Medication 1: at 12:15

## 2024-01-10 RX ADMIN — Medication 500 MILLIGRAM(S): at 13:17

## 2024-01-10 RX ADMIN — Medication 500 MILLIGRAM(S): at 21:23

## 2024-01-10 RX ADMIN — HEPARIN SODIUM 5000 UNIT(S): 5000 INJECTION INTRAVENOUS; SUBCUTANEOUS at 05:47

## 2024-01-10 RX ADMIN — HEPARIN SODIUM 5000 UNIT(S): 5000 INJECTION INTRAVENOUS; SUBCUTANEOUS at 13:13

## 2024-01-10 RX ADMIN — INSULIN GLARGINE 12 UNIT(S): 100 INJECTION, SOLUTION SUBCUTANEOUS at 08:23

## 2024-01-10 RX ADMIN — Medication 3 MILLILITER(S): at 07:10

## 2024-01-10 RX ADMIN — MIRTAZAPINE 15 MILLIGRAM(S): 45 TABLET, ORALLY DISINTEGRATING ORAL at 21:23

## 2024-01-10 RX ADMIN — Medication 3: at 22:11

## 2024-01-10 RX ADMIN — Medication 4 UNIT(S): at 16:53

## 2024-01-10 RX ADMIN — Medication 4 UNIT(S): at 12:15

## 2024-01-10 RX ADMIN — CEFTRIAXONE 100 MILLIGRAM(S): 500 INJECTION, POWDER, FOR SOLUTION INTRAMUSCULAR; INTRAVENOUS at 16:19

## 2024-01-10 RX ADMIN — Medication 3 MILLILITER(S): at 19:19

## 2024-01-10 RX ADMIN — OLANZAPINE 2.5 MILLIGRAM(S): 15 TABLET, FILM COATED ORAL at 00:31

## 2024-01-10 RX ADMIN — QUETIAPINE FUMARATE 25 MILLIGRAM(S): 200 TABLET, FILM COATED ORAL at 21:23

## 2024-01-10 RX ADMIN — Medication 3: at 08:01

## 2024-01-10 RX ADMIN — Medication 1: at 16:53

## 2024-01-10 NOTE — PROGRESS NOTE ADULT - ASSESSMENT
83-year-old female PMH of DM2, anxiety/depression, chronic joint and back pain, osteoarthritis, GERD, anemia, exsmoker dementia, presents to the emergency department with family sent by urgent care by ambulance with report of pneumonia.  One week of dry cough, generalized weakness, no appetite, 101 fever, low blood pressure.  Chest CT-Necrotizing right middle lobe pneumonia.    RECOMMENDATIONS  1-PNA concerning with fever, leukocytosis, necrotizing right middle lobe PNA on imaging in woman just returning from [place]. While TB is in the differential the appearance, lack of adenopathy and acuity suggesting bacterial and potentially polymicrobial. Agree with pulmonary involvement and recommend  -escalation to Zosyn 1/9  -ordered sputum but discussion with pulm about possible bronchoscopy  -1/10 -pt confused, pulling out IVs so concerns about pt not getting medications so will change to ceftriaxone 1 gram IV daily as this will only require 30 minutes to deliver and if we keep having issues can give IM, metronidazole 500mg PO TID and recs to follow    Thank you for consulting us and involving us in the management of this most interesting and challenging case.  We will follow along in the care of this patient. Please call us at [phone] or text me directly on my cell# at [phone] with any concerns.   83-year-old female PMH of DM2, anxiety/depression, chronic joint and back pain, osteoarthritis, GERD, anemia, exsmoker dementia, presents to the emergency department with family sent by urgent care by ambulance with report of pneumonia.  One week of dry cough, generalized weakness, no appetite, 101 fever, low blood pressure.  Chest CT-Necrotizing right middle lobe pneumonia.    RECOMMENDATIONS  1-PNA concerning with fever, leukocytosis, necrotizing right middle lobe PNA on imaging in woman just returning from [place]. While TB is in the differential the appearance, lack of adenopathy and acuity suggesting bacterial and potentially polymicrobial. Agree with pulmonary involvement and recommend  -escalation to Zosyn 1/9  -ordered sputum but discussion with pulm about possible bronchoscopy  -1/10 -pt confused, pulling out IVs so concerns about pt not getting medications so will change to ceftriaxone 1 gram IV daily as this will only require 30 minutes to deliver and if we keep having issues can give IM, metronidazole 500mg PO TID and recs to follow    Thank you for consulting us and involving us in the management of this most interesting and challenging case.  We will follow along in the care of this patient. Please call us at [phone] or text me directly on my cell# at [phone] with any concerns.

## 2024-01-10 NOTE — PROGRESS NOTE ADULT - SUBJECTIVE AND OBJECTIVE BOX
[facility]FECTIOUS DISEASE  Osman Garcia MD PhD, Michelle Braga MD, Tammi Zhao MD, Kenton Coy MD, Jann Acosta MD  and providing coverage with Ana Ortiz MD  Providing Infectious Disease Consultations at [facility], [facility], [facility], [facility]'s    Office# [phone] to schedule follow up appointments  Answering Service for urgent calls or New Consults [phone]  Cell# to text for urgent issues Osman Garcia [phone]     infectious diseases progress note:    FRANKI VALVERDE is a 83y y. o. Female patient    Overnight and events of the last 24hrs reviewed    Allergies    No Known Allergies    Intolerances        ANTIBIOTICS/RELEVANT:  antimicrobials  piperacillin/tazobactam IVPB.. 3.375 Gram(s) IV Intermittent every 8 hours    immunologic:  influenza  Vaccine (HIGH DOSE) 0.7 milliLiter(s) IntraMuscular once    OTHER:  acetaminophen     Tablet .. 650 milliGRAM(s) Oral every 6 hours PRN  albuterol/ipratropium for Nebulization 3 milliLiter(s) Nebulizer every 6 hours  dextrose 5%. 1000 milliLiter(s) IV Continuous <Continuous>  dextrose 5%. 1000 milliLiter(s) IV Continuous <Continuous>  dextrose 50% Injectable 12.5 Gram(s) IV Push once  dextrose 50% Injectable 25 Gram(s) IV Push once  dextrose 50% Injectable 25 Gram(s) IV Push once  dextrose Oral Gel 15 Gram(s) Oral once PRN  glucagon  Injectable 1 milliGRAM(s) IntraMuscular once  guaiFENesin Oral Liquid (Sugar-Free) 200 milliGRAM(s) Oral every 6 hours PRN  heparin   Injectable 5000 Unit(s) SubCutaneous every 8 hours  insulin glargine Injectable (LANTUS) 12 Unit(s) SubCutaneous every morning  insulin lispro (ADMELOG) corrective regimen sliding scale   SubCutaneous three times a day before meals  insulin lispro (ADMELOG) corrective regimen sliding scale   SubCutaneous at bedtime  insulin lispro Injectable (ADMELOG) 4 Unit(s) SubCutaneous three times a day before meals  mirtazapine Soltab 15 milliGRAM(s) Oral at bedtime  QUEtiapine 25 milliGRAM(s) Oral at bedtime  sodium chloride 0.9%. 1000 milliLiter(s) IV Continuous <Continuous>      Objective:  Vital Signs Last 24 Hrs  T(C): 37.1 (10 Jarvis 2024 12:03), Max: 37.1 (10 Jarvis 2024 12:03)  T(F): 98.7 (10 Jarvis 2024 12:03), Max: 98.7 (10 Jarvis 2024 12:03)  HR: 99 (10 Jarvis 2024 12:03) (88 - 99)  BP: 156/75 (10 Jarvis 2024 12:03) (130/75 - 156/75)  BP(mean): --  RR: 17 (10 Jarvis 2024 12:) (17 - 18)  SpO2: 95% (10 Jarvis 2024 12:) (95% - 99%)    Parameters below as of 10 Jarvis 2024 12:03  Patient On (Oxygen Delivery Method): room air        T(C): 37.1 (01-10-24 @ 12:03), Max: 37.1 (24 @ 23:57)  T(C): 37.1 (01-10-24 @ 12:03), Max: 37.1 (24 @ 23:57)  T(C): 37.1 (01-10-24 @ 12:03), Max: 37.1 (24 @ 23:57)    PHYSICAL EXAM:  HEENT: NC atraumatic  Neck: supple  Respiratory: no accessory muscle use, breathing comfortably, some asymmetry in exam  Cardiovascular: distant  Gastrointestinal: normal appearing, nondistended  Extremities: no clubbing, no cyanosis,  Neuro: confused and pulling out IVs      LABS:                          8.3    16.34 )-----------( 224      ( 2024 11:15 )             24.7       WBC  16.34  @ 11:15  16.10  @ 22:05          137  |  106  |  28<H>  ----------------------------<  203<H>  4.5   |  25  |  1.00    Ca    8.3<L>      2024 11:15    TPro  6.9  /  Alb  2.3<L>  /  TBili  0.5  /  DBili  x   /  AST  28  /  ALT  16  /  AlkPhos  114        Creatinine: 1.00 mg/dL (24 @ 11:15)  Creatinine: 1.60 mg/dL (24 @ 22:05)      PT/INR - ( 2024 22:05 )   PT: 12.4 sec;   INR: 1.06 ratio         PTT - ( 2024 22:05 )  PTT:30.2 sec  Urinalysis Basic - ( 2024 22:30 )    Color: Yellow / Appearance: Clear / S.042 / pH: x  Gluc: x / Ketone: Negative mg/dL  / Bili: Negative / Urobili: 0.2 mg/dL   Blood: x / Protein: 100 mg/dL / Nitrite: Negative   Leuk Esterase: Small / RBC: 4 /HPF / WBC 22 /HPF   Sq Epi: x / Non Sq Epi: x / Bacteria: Occasional /HPF            INFLAMMATORY MARKERS      MICROBIOLOGY:              RADIOLOGY & ADDITIONAL STUDIES:   [facility]OUS DISE[facility]  Osman Garcia MD PhD, Michelle Braga MD, Tammi Zhao MD, Kenton Coy MD, Jann Acosta MD  and providing coverage with Ana Ortiz MD  Providing Infectious Disease Consultations at [facility], [facility], [facility], [facility]'s    Office# [phone] to schedule follow up appointments  Answering Service for urgent calls or New Consults [phone]  Cell# to text for urgent issues Osman Garcia [phone]     infectious diseases progress note:    FRANKI VALVERDE is a 83y y. o. Female patient    Overnight and events of the last 24hrs reviewed    Allergies    No Known Allergies    Intolerances        ANTIBIOTICS/RELEVANT:  antimicrobials  piperacillin/tazobactam IVPB.. 3.375 Gram(s) IV Intermittent every 8 hours    immunologic:  influenza  Vaccine (HIGH DOSE) 0.7 milliLiter(s) IntraMuscular once    OTHER:  acetaminophen     Tablet .. 650 milliGRAM(s) Oral every 6 hours PRN  albuterol/ipratropium for Nebulization 3 milliLiter(s) Nebulizer every 6 hours  dextrose 5%. 1000 milliLiter(s) IV Continuous <Continuous>  dextrose 5%. 1000 milliLiter(s) IV Continuous <Continuous>  dextrose 50% Injectable 12.5 Gram(s) IV Push once  dextrose 50% Injectable 25 Gram(s) IV Push once  dextrose 50% Injectable 25 Gram(s) IV Push once  dextrose Oral Gel 15 Gram(s) Oral once PRN  glucagon  Injectable 1 milliGRAM(s) IntraMuscular once  guaiFENesin Oral Liquid (Sugar-Free) 200 milliGRAM(s) Oral every 6 hours PRN  heparin   Injectable 5000 Unit(s) SubCutaneous every 8 hours  insulin glargine Injectable (LANTUS) 12 Unit(s) SubCutaneous every morning  insulin lispro (ADMELOG) corrective regimen sliding scale   SubCutaneous three times a day before meals  insulin lispro (ADMELOG) corrective regimen sliding scale   SubCutaneous at bedtime  insulin lispro Injectable (ADMELOG) 4 Unit(s) SubCutaneous three times a day before meals  mirtazapine Soltab 15 milliGRAM(s) Oral at bedtime  QUEtiapine 25 milliGRAM(s) Oral at bedtime  sodium chloride 0.9%. 1000 milliLiter(s) IV Continuous <Continuous>      Objective:  Vital Signs Last 24 Hrs  T(C): 37.1 (10 Jarvis 2024 12:03), Max: 37.1 (10 Jarvis 2024 12:03)  T(F): 98.7 (10 Jarvis 2024 12:03), Max: 98.7 (10 Jarvis 2024 12:03)  HR: 99 (10 Jarvis 2024 12:03) (88 - 99)  BP: 156/75 (10 Jarvis 2024 12:03) (130/75 - 156/75)  BP(mean): --  RR: 17 (10 Jarvis 2024 12:) (17 - 18)  SpO2: 95% (10 Jarvis 2024 12:) (95% - 99%)    Parameters below as of 10 Jarvis 2024 12:03  Patient On (Oxygen Delivery Method): room air        T(C): 37.1 (01-10-24 @ 12:03), Max: 37.1 (24 @ 23:57)  T(C): 37.1 (01-10-24 @ 12:03), Max: 37.1 (24 @ 23:57)  T(C): 37.1 (01-10-24 @ 12:03), Max: 37.1 (24 @ 23:57)    PHYSICAL EXAM:  HEENT: NC atraumatic  Neck: supple  Respiratory: no accessory muscle use, breathing comfortably, some asymmetry in exam  Cardiovascular: distant  Gastrointestinal: normal appearing, nondistended  Extremities: no clubbing, no cyanosis,  Neuro: confused and pulling out IVs      LABS:                          8.3    16.34 )-----------( 224      ( 2024 11:15 )             24.7       WBC  16.34  @ 11:15  16.10  @ 22:05          137  |  106  |  28<H>  ----------------------------<  203<H>  4.5   |  25  |  1.00    Ca    8.3<L>      2024 11:15    TPro  6.9  /  Alb  2.3<L>  /  TBili  0.5  /  DBili  x   /  AST  28  /  ALT  16  /  AlkPhos  114        Creatinine: 1.00 mg/dL (24 @ 11:15)  Creatinine: 1.60 mg/dL (24 @ 22:05)      PT/INR - ( 2024 22:05 )   PT: 12.4 sec;   INR: 1.06 ratio         PTT - ( 2024 22:05 )  PTT:30.2 sec  Urinalysis Basic - ( 2024 22:30 )    Color: Yellow / Appearance: Clear / S.042 / pH: x  Gluc: x / Ketone: Negative mg/dL  / Bili: Negative / Urobili: 0.2 mg/dL   Blood: x / Protein: 100 mg/dL / Nitrite: Negative   Leuk Esterase: Small / RBC: 4 /HPF / WBC 22 /HPF   Sq Epi: x / Non Sq Epi: x / Bacteria: Occasional /HPF            INFLAMMATORY MARKERS      MICROBIOLOGY:              RADIOLOGY & ADDITIONAL STUDIES:

## 2024-01-10 NOTE — PROGRESS NOTE ADULT - SUBJECTIVE AND OBJECTIVE BOX
Date/Time Patient Seen:  		  Referring MD:   Data Reviewed	       Patient is a 83y old  Female who presents with a chief complaint of fever (09 Jan 2024 16:06)      Subjective/HPI     PAST MEDICAL & SURGICAL HISTORY:  DM (diabetes mellitus)    Type 2 diabetes mellitus    Anxiety    OA (osteoarthritis) of knee  (Right knee)    Anemia    Other chronic pancreatitis    DM (diabetes mellitus)    HLD (hyperlipidemia)    Anxiety and depression    Seasonal allergies    Diabetes mellitus, type 2    GERD (gastroesophageal reflux disease)    Chronic joint pain    S/P arthroscopic knee surgery  (Right knee, 2007)    History of back surgery  (2007)    S/P cholecystectomy  (2002)    S/P subtotal gastrectomy  (with gastrojejunal anastomosis, 30 years ago)    S/P knee replacement          Medication list         MEDICATIONS  (STANDING):  albuterol/ipratropium for Nebulization 3 milliLiter(s) Nebulizer every 6 hours  dextrose 5%. 1000 milliLiter(s) (100 mL/Hr) IV Continuous <Continuous>  dextrose 5%. 1000 milliLiter(s) (50 mL/Hr) IV Continuous <Continuous>  dextrose 50% Injectable 25 Gram(s) IV Push once  dextrose 50% Injectable 12.5 Gram(s) IV Push once  dextrose 50% Injectable 25 Gram(s) IV Push once  glucagon  Injectable 1 milliGRAM(s) IntraMuscular once  heparin   Injectable 5000 Unit(s) SubCutaneous every 8 hours  influenza  Vaccine (HIGH DOSE) 0.7 milliLiter(s) IntraMuscular once  insulin glargine Injectable (LANTUS) 12 Unit(s) SubCutaneous every morning  insulin lispro (ADMELOG) corrective regimen sliding scale   SubCutaneous three times a day before meals  insulin lispro (ADMELOG) corrective regimen sliding scale   SubCutaneous at bedtime  insulin lispro Injectable (ADMELOG) 4 Unit(s) SubCutaneous three times a day before meals  mirtazapine Soltab 15 milliGRAM(s) Oral at bedtime  piperacillin/tazobactam IVPB.. 3.375 Gram(s) IV Intermittent every 8 hours  QUEtiapine 25 milliGRAM(s) Oral at bedtime  sodium chloride 0.9%. 1000 milliLiter(s) (75 mL/Hr) IV Continuous <Continuous>    MEDICATIONS  (PRN):  acetaminophen     Tablet .. 650 milliGRAM(s) Oral every 6 hours PRN Temp greater or equal to 38C (100.4F), Mild Pain (1 - 3)  dextrose Oral Gel 15 Gram(s) Oral once PRN Blood Glucose LESS THAN 70 milliGRAM(s)/deciliter  guaiFENesin Oral Liquid (Sugar-Free) 200 milliGRAM(s) Oral every 6 hours PRN Cough         Vitals log        ICU Vital Signs Last 24 Hrs  T(C): 37 (09 Jan 2024 21:27), Max: 37 (09 Jan 2024 18:51)  T(F): 98.6 (09 Jan 2024 21:27), Max: 98.6 (09 Jan 2024 18:51)  HR: 89 (09 Jan 2024 21:27) (83 - 95)  BP: 132/82 (09 Jan 2024 21:27) (130/75 - 145/56)  BP(mean): --  ABP: --  ABP(mean): --  RR: 18 (09 Jan 2024 21:27) (16 - 22)  SpO2: 96% (09 Jan 2024 21:27) (96% - 99%)    O2 Parameters below as of 09 Jan 2024 21:27  Patient On (Oxygen Delivery Method): room air                 Input and Output:  I&O's Detail      Lab Data                        8.3    16.34 )-----------( 224      ( 09 Jan 2024 11:15 )             24.7     01-09    137  |  106  |  28<H>  ----------------------------<  203<H>  4.5   |  25  |  1.00    Ca    8.3<L>      09 Jan 2024 11:15    TPro  6.9  /  Alb  2.3<L>  /  TBili  0.5  /  DBili  x   /  AST  28  /  ALT  16  /  AlkPhos  114  01-09            Review of Systems	      Objective     Physical Examination    heart s1s2  lung dc BS  head nc      Pertinent Lab findings & Imaging      Shanta:  NO   Adequate UO     I&O's Detail           Discussed with:     Cultures:	        Radiology

## 2024-01-10 NOTE — CASE MANAGEMENT PROGRESS NOTE - NSCMPROGRESSNOTE_GEN_ALL_CORE
Conts on zosyn for PNA 96-98% O2 sat on R/A - agitated altho post 2 doses of zyprexa- from home- OOB

## 2024-01-10 NOTE — PROGRESS NOTE ADULT - SUBJECTIVE AND OBJECTIVE BOX
Patient is a 83y old  Female who presents with a chief complaint of fever (10 Jarvis 2024 12:16)    Date of servie : 01-10-24 @ 13:30  INTERVAL HPI/OVERNIGHT EVENTS:  T(C): 37.1 (01-10-24 @ 12:03), Max: 37.1 (01-10-24 @ 12:03)  HR: 99 (01-10-24 @ 12:03) (88 - 99)  BP: 156/75 (01-10-24 @ 12:03) (130/75 - 156/75)  RR: 17 (01-10-24 @ 12:03) (17 - 18)  SpO2: 95% (01-10-24 @ 12:03) (95% - 99%)  Wt(kg): --  I&O's Summary      LABS:                        8.3    16.34 )-----------( 224      ( 2024 11:15 )             24.7         137  |  106  |  28<H>  ----------------------------<  203<H>  4.5   |  25  |  1.00    Ca    8.3<L>      2024 11:15    TPro  6.9  /  Alb  2.3<L>  /  TBili  0.5  /  DBili  x   /  AST  28  /  ALT  16  /  AlkPhos  114      PT/INR - ( 2024 22:05 )   PT: 12.4 sec;   INR: 1.06 ratio         PTT - ( 2024 22:05 )  PTT:30.2 sec  Urinalysis Basic - ( 2024 22:30 )    Color: Yellow / Appearance: Clear / S.042 / pH: x  Gluc: x / Ketone: Negative mg/dL  / Bili: Negative / Urobili: 0.2 mg/dL   Blood: x / Protein: 100 mg/dL / Nitrite: Negative   Leuk Esterase: Small / RBC: 4 /HPF / WBC 22 /HPF   Sq Epi: x / Non Sq Epi: x / Bacteria: Occasional /HPF      CAPILLARY BLOOD GLUCOSE      POCT Blood Glucose.: 171 mg/dL (10 Jarvis 2024 11:59)  POCT Blood Glucose.: 294 mg/dL (10 Jarvis 2024 07:32)  POCT Blood Glucose.: 230 mg/dL (2024 22:05)  POCT Blood Glucose.: 205 mg/dL (2024 16:24)        Urinalysis Basic - ( 2024 22:30 )    Color: Yellow / Appearance: Clear / S.042 / pH: x  Gluc: x / Ketone: Negative mg/dL  / Bili: Negative / Urobili: 0.2 mg/dL   Blood: x / Protein: 100 mg/dL / Nitrite: Negative   Leuk Esterase: Small / RBC: 4 /HPF / WBC 22 /HPF   Sq Epi: x / Non Sq Epi: x / Bacteria: Occasional /HPF        MEDICATIONS  (STANDING):  albuterol/ipratropium for Nebulization 3 milliLiter(s) Nebulizer every 6 hours  cefTRIAXone   IVPB 1000 milliGRAM(s) IV Intermittent every 24 hours  dextrose 5%. 1000 milliLiter(s) (50 mL/Hr) IV Continuous <Continuous>  dextrose 5%. 1000 milliLiter(s) (100 mL/Hr) IV Continuous <Continuous>  dextrose 50% Injectable 12.5 Gram(s) IV Push once  dextrose 50% Injectable 25 Gram(s) IV Push once  dextrose 50% Injectable 25 Gram(s) IV Push once  glucagon  Injectable 1 milliGRAM(s) IntraMuscular once  heparin   Injectable 5000 Unit(s) SubCutaneous every 8 hours  influenza  Vaccine (HIGH DOSE) 0.7 milliLiter(s) IntraMuscular once  insulin glargine Injectable (LANTUS) 12 Unit(s) SubCutaneous every morning  insulin lispro (ADMELOG) corrective regimen sliding scale   SubCutaneous three times a day before meals  insulin lispro (ADMELOG) corrective regimen sliding scale   SubCutaneous at bedtime  insulin lispro Injectable (ADMELOG) 4 Unit(s) SubCutaneous three times a day before meals  metroNIDAZOLE    Tablet 500 milliGRAM(s) Oral every 8 hours  mirtazapine Soltab 15 milliGRAM(s) Oral at bedtime  QUEtiapine 25 milliGRAM(s) Oral at bedtime  sodium chloride 0.9%. 1000 milliLiter(s) (75 mL/Hr) IV Continuous <Continuous>    MEDICATIONS  (PRN):  acetaminophen     Tablet .. 650 milliGRAM(s) Oral every 6 hours PRN Temp greater or equal to 38C (100.4F), Mild Pain (1 - 3)  dextrose Oral Gel 15 Gram(s) Oral once PRN Blood Glucose LESS THAN 70 milliGRAM(s)/deciliter  guaiFENesin Oral Liquid (Sugar-Free) 200 milliGRAM(s) Oral every 6 hours PRN Cough          PHYSICAL EXAM:  GENERAL: NAD, well-groomed, well-developed  HEAD:  Atraumatic, Normocephalic  CHEST/LUNG: Clear to percussion bilaterally; No rales, rhonchi, wheezing, or rubs  HEART: Regular rate and rhythm; No murmurs, rubs, or gallops  ABDOMEN: Soft, Nontender, Nondistended; Bowel sounds present  EXTREMITIES:  2+ Peripheral Pulses, No clubbing, cyanosis, or edema  LYMPH: No lymphadenopathy noted  SKIN: No rashes or lesions    Care Discussed with Consultants/Other Providers [ ] YES  [ ] NO

## 2024-01-10 NOTE — PROGRESS NOTE ADULT - SUBJECTIVE AND OBJECTIVE BOX
[facility]  Mitchell Cantrell PA-C  [address] [place] [address]  [phone]      INTERVAL HPI/OVERNIGHT EVENTS:  Pt s/e  No new GI events    MEDICATIONS  (STANDING):  albuterol/ipratropium for Nebulization 3 milliLiter(s) Nebulizer every 6 hours  dextrose 5%. 1000 milliLiter(s) (100 mL/Hr) IV Continuous <Continuous>  dextrose 5%. 1000 milliLiter(s) (50 mL/Hr) IV Continuous <Continuous>  dextrose 50% Injectable 12.5 Gram(s) IV Push once  dextrose 50% Injectable 25 Gram(s) IV Push once  dextrose 50% Injectable 25 Gram(s) IV Push once  glucagon  Injectable 1 milliGRAM(s) IntraMuscular once  heparin   Injectable 5000 Unit(s) SubCutaneous every 8 hours  influenza  Vaccine (HIGH DOSE) 0.7 milliLiter(s) IntraMuscular once  insulin glargine Injectable (LANTUS) 12 Unit(s) SubCutaneous every morning  insulin lispro (ADMELOG) corrective regimen sliding scale   SubCutaneous three times a day before meals  insulin lispro (ADMELOG) corrective regimen sliding scale   SubCutaneous at bedtime  insulin lispro Injectable (ADMELOG) 4 Unit(s) SubCutaneous three times a day before meals  mirtazapine Soltab 15 milliGRAM(s) Oral at bedtime  piperacillin/tazobactam IVPB.. 3.375 Gram(s) IV Intermittent every 8 hours  QUEtiapine 25 milliGRAM(s) Oral at bedtime  sodium chloride 0.9%. 1000 milliLiter(s) (75 mL/Hr) IV Continuous <Continuous>    MEDICATIONS  (PRN):  acetaminophen     Tablet .. 650 milliGRAM(s) Oral every 6 hours PRN Temp greater or equal to 38C (100.4F), Mild Pain (1 - 3)  dextrose Oral Gel 15 Gram(s) Oral once PRN Blood Glucose LESS THAN 70 milliGRAM(s)/deciliter  guaiFENesin Oral Liquid (Sugar-Free) 200 milliGRAM(s) Oral every 6 hours PRN Cough      Allergies    No Known Allergies      PHYSICAL EXAM:   Vital Signs:  Vital Signs Last 24 Hrs  T(C): 36.8 (10 Jarvis 2024 05:07), Max: 37 (2024 18:51)  T(F): 98.2 (10 Jarvis 2024 05:07), Max: 98.6 (2024 18:51)  HR: 88 (10 Jarvis 2024 07:10) (88 - 98)  BP: 154/63 (10 Jarvis 2024 05:07) (130/75 - 154/63)  BP(mean): --  RR: 18 (10 Jarvis 2024 05:07) (18 - 18)  SpO2: 96% (10 Jarvis 2024 07:10) (96% - 99%)    Parameters below as of 10 Jarvis 2024 07:10  Patient On (Oxygen Delivery Method): room air      Daily     Daily Weight in k.6 (10 Jarvis 2024 05:07)    GENERAL:  Appears stated age  HEENT:  NC/AT  CHEST:  Full & symmetric excursion  HEART:  Regular rhythm  ABDOMEN:  Soft, non-tender, non-distended  EXTEREMITIES:  no cyanosis  SKIN:  No rash  NEURO:  Confused      LABS:                        8.3    16.34 )-----------( 224      ( 2024 11:15 )             24.7     01-09    137  |  106  |  28<H>  ----------------------------<  203<H>  4.5   |  25  |  1.00    Ca    8.3<L>      2024 11:15    TPro  6.9  /  Alb  2.3<L>  /  TBili  0.5  /  DBili  x   /  AST  28  /  ALT  16  /  AlkPhos  114  01-09    PT/INR - ( 2024 22:05 )   PT: 12.4 sec;   INR: 1.06 ratio         PTT - ( 2024 22:05 )  PTT:30.2 sec  Urinalysis Basic - ( 2024 22:30 )    Color: Yellow / Appearance: Clear / S.042 / pH: x  Gluc: x / Ketone: Negative mg/dL  / Bili: Negative / Urobili: 0.2 mg/dL   Blood: x / Protein: 100 mg/dL / Nitrite: Negative   Leuk Esterase: Small / RBC: 4 /HPF / WBC 22 /HPF   Sq Epi: x / Non Sq Epi: x / Bacteria: Occasional /HPF   [facility]  Mitchell Cantrell PA-C  [address] [place] [address]  [phone]      INTERVAL HPI/OVERNIGHT EVENTS:  Pt s/e  No new GI events    MEDICATIONS  (STANDING):  albuterol/ipratropium for Nebulization 3 milliLiter(s) Nebulizer every 6 hours  dextrose 5%. 1000 milliLiter(s) (100 mL/Hr) IV Continuous <Continuous>  dextrose 5%. 1000 milliLiter(s) (50 mL/Hr) IV Continuous <Continuous>  dextrose 50% Injectable 12.5 Gram(s) IV Push once  dextrose 50% Injectable 25 Gram(s) IV Push once  dextrose 50% Injectable 25 Gram(s) IV Push once  glucagon  Injectable 1 milliGRAM(s) IntraMuscular once  heparin   Injectable 5000 Unit(s) SubCutaneous every 8 hours  influenza  Vaccine (HIGH DOSE) 0.7 milliLiter(s) IntraMuscular once  insulin glargine Injectable (LANTUS) 12 Unit(s) SubCutaneous every morning  insulin lispro (ADMELOG) corrective regimen sliding scale   SubCutaneous three times a day before meals  insulin lispro (ADMELOG) corrective regimen sliding scale   SubCutaneous at bedtime  insulin lispro Injectable (ADMELOG) 4 Unit(s) SubCutaneous three times a day before meals  mirtazapine Soltab 15 milliGRAM(s) Oral at bedtime  piperacillin/tazobactam IVPB.. 3.375 Gram(s) IV Intermittent every 8 hours  QUEtiapine 25 milliGRAM(s) Oral at bedtime  sodium chloride 0.9%. 1000 milliLiter(s) (75 mL/Hr) IV Continuous <Continuous>    MEDICATIONS  (PRN):  acetaminophen     Tablet .. 650 milliGRAM(s) Oral every 6 hours PRN Temp greater or equal to 38C (100.4F), Mild Pain (1 - 3)  dextrose Oral Gel 15 Gram(s) Oral once PRN Blood Glucose LESS THAN 70 milliGRAM(s)/deciliter  guaiFENesin Oral Liquid (Sugar-Free) 200 milliGRAM(s) Oral every 6 hours PRN Cough      Allergies    No Known Allergies      PHYSICAL EXAM:   Vital Signs:  Vital Signs Last 24 Hrs  T(C): 36.8 (10 Jarvis 2024 05:07), Max: 37 (2024 18:51)  T(F): 98.2 (10 Jarvis 2024 05:07), Max: 98.6 (2024 18:51)  HR: 88 (10 Jarvis 2024 07:10) (88 - 98)  BP: 154/63 (10 Jarvis 2024 05:07) (130/75 - 154/63)  BP(mean): --  RR: 18 (10 Jarvis 2024 05:07) (18 - 18)  SpO2: 96% (10 Jarvis 2024 07:10) (96% - 99%)    Parameters below as of 10 Jarvis 2024 07:10  Patient On (Oxygen Delivery Method): room air      Daily     Daily Weight in k.6 (10 Jarvsi 2024 05:07)    GENERAL:  Appears stated age  HEENT:  NC/AT  CHEST:  Full & symmetric excursion  HEART:  Regular rhythm  ABDOMEN:  Soft, non-tender, non-distended  EXTEREMITIES:  no cyanosis  SKIN:  No rash  NEURO:  Confused      LABS:                        8.3    16.34 )-----------( 224      ( 2024 11:15 )             24.7     01-09    137  |  106  |  28<H>  ----------------------------<  203<H>  4.5   |  25  |  1.00    Ca    8.3<L>      2024 11:15    TPro  6.9  /  Alb  2.3<L>  /  TBili  0.5  /  DBili  x   /  AST  28  /  ALT  16  /  AlkPhos  114  01-09    PT/INR - ( 2024 22:05 )   PT: 12.4 sec;   INR: 1.06 ratio         PTT - ( 2024 22:05 )  PTT:30.2 sec  Urinalysis Basic - ( 2024 22:30 )    Color: Yellow / Appearance: Clear / S.042 / pH: x  Gluc: x / Ketone: Negative mg/dL  / Bili: Negative / Urobili: 0.2 mg/dL   Blood: x / Protein: 100 mg/dL / Nitrite: Negative   Leuk Esterase: Small / RBC: 4 /HPF / WBC 22 /HPF   Sq Epi: x / Non Sq Epi: x / Bacteria: Occasional /HPF

## 2024-01-10 NOTE — PROGRESS NOTE ADULT - ASSESSMENT
83-year-old female PMH of DM2, anxiety/depression, chronic joint and back pain, osteoarthritis, GERD, anemia, exsmoker dementia, presents to the emergency department with son sent by urgent care by ambulance with report of pneumonia.  Patient states for the past week patient has had dry cough, generalized weakness, no appetite, at urgent care patient flu/COVID was negative, concern for patient having 101 fever, low blood pressure, and right sided lung infiltrate.  Pt just came back from [place] and as per son the doctors there had stopped all her meds except Seroquel and mirtazapine , she is not on any meds for diabetes     1 Sepsis sec to pneumonia POA  - cw ceftriaxone  - cw azithromycin  - fu cultures  - check legionella  - pulmonary fu     2 Uncontrolled DM  - pt not on any meds now as per son  -  basal, bolus  - monitor FS  - endo fu     3 Dementia  - cw mirtazapine  - cw Seroquel    4 Lovenox for DVT prophylaxis

## 2024-01-10 NOTE — PROGRESS NOTE ADULT - ASSESSMENT
anemia  pnuemonia    reg diet  no overt GI bleed   occult negative  cbc daily  transfuse as needed   will follow    I reviewed the overnight course of events on the unit, re-confirming the patient history. I discussed the care with the patient  Differential diagnosis and plan of care discussed with patient after the evaluation  35 minutes spent on total encounter of which more than fifty percent of the encounter was spent counseling and/or coordinating care by the attending physician.

## 2024-01-10 NOTE — PROGRESS NOTE ADULT - SUBJECTIVE AND OBJECTIVE BOX
Patient is a 83y Female with a known history of :  Type 2 diabetes mellitus [E11.9]      HPI:  83-year-old female PMH of DM2, anxiety/depression, chronic joint and back pain, osteoarthritis, GERD, anemia, exsmoker dementia, presents to the emergency department with son sent by urgent care by ambulance with report of pneumonia.  Patient states for the past week patient has had dry cough, generalized weakness, no appetite, at urgent care patient flu/COVID was negative, concern for patient having 101 fever, low blood pressure, and right sided lung infiltrate.  Pt just came back from [place] and as per son the doctors there had stopped all her meds except Seroquel and mirtazapine , she is not on any meds for diabetes  (09 Jan 2024 07:42)      REVIEW OF SYSTEMS:  CONSTITUTIONAL: No weakness, fevers or chills  EYES/ENT: No visual changes;  No vertigo or throat pain   NECK: No pain or stiffness  RESPIRATORY: No cough, wheezing, hemoptysis; No shortness of breath  CARDIOVASCULAR: No chest pain or palpitations  GASTROINTESTINAL: No abdominal pain. No nausea, vomiting, or hematemesis; No diarrhea or constipation. No melena or hematochezia.  GENITOURINARY: No dysuria, frequency or hematuria  NEUROLOGICAL: No numbness or weakness  SKIN: No itching or rash  All other review of systems is negative unless indicated above        MEDICATIONS  (STANDING):  albuterol/ipratropium for Nebulization 3 milliLiter(s) Nebulizer every 6 hours  cefTRIAXone   IVPB 1000 milliGRAM(s) IV Intermittent every 24 hours  dextrose 5%. 1000 milliLiter(s) (100 mL/Hr) IV Continuous <Continuous>  dextrose 5%. 1000 milliLiter(s) (50 mL/Hr) IV Continuous <Continuous>  dextrose 50% Injectable 12.5 Gram(s) IV Push once  dextrose 50% Injectable 25 Gram(s) IV Push once  dextrose 50% Injectable 25 Gram(s) IV Push once  glucagon  Injectable 1 milliGRAM(s) IntraMuscular once  heparin   Injectable 5000 Unit(s) SubCutaneous every 8 hours  influenza  Vaccine (HIGH DOSE) 0.7 milliLiter(s) IntraMuscular once  insulin glargine Injectable (LANTUS) 12 Unit(s) SubCutaneous every morning  insulin lispro (ADMELOG) corrective regimen sliding scale   SubCutaneous three times a day before meals  insulin lispro (ADMELOG) corrective regimen sliding scale   SubCutaneous at bedtime  insulin lispro Injectable (ADMELOG) 4 Unit(s) SubCutaneous three times a day before meals  metroNIDAZOLE    Tablet 500 milliGRAM(s) Oral every 8 hours  mirtazapine Soltab 15 milliGRAM(s) Oral at bedtime  QUEtiapine 25 milliGRAM(s) Oral at bedtime  sodium chloride 0.9%. 1000 milliLiter(s) (75 mL/Hr) IV Continuous <Continuous>    MEDICATIONS  (PRN):  acetaminophen     Tablet .. 650 milliGRAM(s) Oral every 6 hours PRN Temp greater or equal to 38C (100.4F), Mild Pain (1 - 3)  dextrose Oral Gel 15 Gram(s) Oral once PRN Blood Glucose LESS THAN 70 milliGRAM(s)/deciliter  guaiFENesin Oral Liquid (Sugar-Free) 200 milliGRAM(s) Oral every 6 hours PRN Cough      ALLERGIES: No Known Allergies      FAMILY HISTORY:  No pertinent family history in first degree relatives        PHYSICAL EXAMINATION:  -----------------------------  T(C): 37.1 (01-10-24 @ 12:03), Max: 37.1 (01-10-24 @ 12:03)  HR: 99 (01-10-24 @ 12:03) (88 - 99)  BP: 156/75 (01-10-24 @ 12:03) (132/82 - 156/75)  RR: 17 (01-10-24 @ 12:03) (17 - 18)  SpO2: 95% (01-10-24 @ 12:03) (95% - 96%)  Wt(kg): --    01-10 @ 07:01  -  01-10 @ 20:58  --------------------------------------------------------  IN:    IV PiggyBack: 50 mL    sodium chloride 0.9%: 150 mL  Total IN: 200 mL    OUT:  Total OUT: 0 mL    Total NET: 200 mL      PHYSICAL EXAM:  Constitutional: Awake in NAD  HEENT:  FRANCES, EOMI  Neck: No JVD  Pulmonary: CTA B/L No R/R/W  Cardiovascular: PMI not palpable non-displaced Regular S1 and S2, no murmurs  Gastrointestinal: Bowel Sounds present, soft, nontender.   Extremities: Trace peripheral edema.   Neuro: No gross focal deficits    LABS: All Labs Reviewed:          LABS:   --------  01-09    137  |  106  |  28<H>  ----------------------------<  203<H>  4.5   |  25  |  1.00    Ca    8.3<L>      09 Jan 2024 11:15    TPro  6.9  /  Alb  2.3<L>  /  TBili  0.5  /  DBili  x   /  AST  28  /  ALT  16  /  AlkPhos  114  01-09                         8.3    16.34 )-----------( 224      ( 09 Jan 2024 11:15 )             24.7     PT/INR - ( 08 Jan 2024 22:05 )   PT: 12.4 sec;   INR: 1.06 ratio         PTT - ( 08 Jan 2024 22:05 )  PTT:30.2 sec        Culture Results:   No growth at 24 hours (01-08 @ 22:05)  Culture Results:   No growth at 24 hours (01-08 @ 21:55)

## 2024-01-10 NOTE — PROGRESS NOTE ADULT - ASSESSMENT
83-year-old female PMH of DM2, anxiety/depression, chronic joint and back pain, osteoarthritis, GERD, anemia, ex-smoker dementia, presents to the emergency department with son sent by urgent care by ambulance with report of pneumonia.    pna  necrotizing pna?  anxiety  depression  OP  OA  DM  GERD  Anemia  Dementia  Ex smoker    zosyn  ivf  cardio eval noted  agitated overnight    ID eval  CT chest reviewed  PNA eval - necrotizing pna reported on CT  biomarkers  cx -   sputum -   monitor VS and HD and Sat  legionella - strep Ag -   discussion about TB risk  cough rx regimen  bronchodilators for cough assist and mucociliary clearance  goal sat > 88 pct  Pall Eval - GOC discussion

## 2024-01-11 LAB
ALBUMIN SERPL ELPH-MCNC: 2.2 G/DL — LOW (ref 3.3–5)
ALBUMIN SERPL ELPH-MCNC: 2.2 G/DL — LOW (ref 3.3–5)
ALP SERPL-CCNC: 107 U/L — SIGNIFICANT CHANGE UP (ref 40–120)
ALP SERPL-CCNC: 107 U/L — SIGNIFICANT CHANGE UP (ref 40–120)
ALT FLD-CCNC: 17 U/L — SIGNIFICANT CHANGE UP (ref 12–78)
ALT FLD-CCNC: 17 U/L — SIGNIFICANT CHANGE UP (ref 12–78)
ANION GAP SERPL CALC-SCNC: 6 MMOL/L — SIGNIFICANT CHANGE UP (ref 5–17)
ANION GAP SERPL CALC-SCNC: 6 MMOL/L — SIGNIFICANT CHANGE UP (ref 5–17)
AST SERPL-CCNC: 15 U/L — SIGNIFICANT CHANGE UP (ref 15–37)
AST SERPL-CCNC: 15 U/L — SIGNIFICANT CHANGE UP (ref 15–37)
BILIRUB SERPL-MCNC: 0.4 MG/DL — SIGNIFICANT CHANGE UP (ref 0.2–1.2)
BILIRUB SERPL-MCNC: 0.4 MG/DL — SIGNIFICANT CHANGE UP (ref 0.2–1.2)
BUN SERPL-MCNC: 15 MG/DL — SIGNIFICANT CHANGE UP (ref 7–23)
BUN SERPL-MCNC: 15 MG/DL — SIGNIFICANT CHANGE UP (ref 7–23)
CALCIUM SERPL-MCNC: 8.3 MG/DL — LOW (ref 8.5–10.1)
CALCIUM SERPL-MCNC: 8.3 MG/DL — LOW (ref 8.5–10.1)
CHLORIDE SERPL-SCNC: 107 MMOL/L — SIGNIFICANT CHANGE UP (ref 96–108)
CHLORIDE SERPL-SCNC: 107 MMOL/L — SIGNIFICANT CHANGE UP (ref 96–108)
CO2 SERPL-SCNC: 25 MMOL/L — SIGNIFICANT CHANGE UP (ref 22–31)
CO2 SERPL-SCNC: 25 MMOL/L — SIGNIFICANT CHANGE UP (ref 22–31)
CREAT SERPL-MCNC: 0.87 MG/DL — SIGNIFICANT CHANGE UP (ref 0.5–1.3)
CREAT SERPL-MCNC: 0.87 MG/DL — SIGNIFICANT CHANGE UP (ref 0.5–1.3)
CULTURE RESULTS: SIGNIFICANT CHANGE UP
CULTURE RESULTS: SIGNIFICANT CHANGE UP
EGFR: 66 ML/MIN/1.73M2 — SIGNIFICANT CHANGE UP
EGFR: 66 ML/MIN/1.73M2 — SIGNIFICANT CHANGE UP
GAMMA INTERFERON BACKGROUND BLD IA-ACNC: 0.02 IU/ML — SIGNIFICANT CHANGE UP
GAMMA INTERFERON BACKGROUND BLD IA-ACNC: 0.02 IU/ML — SIGNIFICANT CHANGE UP
GLUCOSE SERPL-MCNC: 135 MG/DL — HIGH (ref 70–99)
GLUCOSE SERPL-MCNC: 135 MG/DL — HIGH (ref 70–99)
HCT VFR BLD CALC: 26.1 % — LOW (ref 34.5–45)
HCT VFR BLD CALC: 26.1 % — LOW (ref 34.5–45)
HGB BLD-MCNC: 8.7 G/DL — LOW (ref 11.5–15.5)
HGB BLD-MCNC: 8.7 G/DL — LOW (ref 11.5–15.5)
M TB IFN-G BLD-IMP: NEGATIVE — SIGNIFICANT CHANGE UP
M TB IFN-G BLD-IMP: NEGATIVE — SIGNIFICANT CHANGE UP
M TB IFN-G CD4+ BCKGRND COR BLD-ACNC: -0.01 IU/ML — SIGNIFICANT CHANGE UP
M TB IFN-G CD4+ BCKGRND COR BLD-ACNC: -0.01 IU/ML — SIGNIFICANT CHANGE UP
M TB IFN-G CD4+CD8+ BCKGRND COR BLD-ACNC: -0.01 IU/ML — SIGNIFICANT CHANGE UP
M TB IFN-G CD4+CD8+ BCKGRND COR BLD-ACNC: -0.01 IU/ML — SIGNIFICANT CHANGE UP
MCHC RBC-ENTMCNC: 29.7 PG — SIGNIFICANT CHANGE UP (ref 27–34)
MCHC RBC-ENTMCNC: 29.7 PG — SIGNIFICANT CHANGE UP (ref 27–34)
MCHC RBC-ENTMCNC: 33.3 GM/DL — SIGNIFICANT CHANGE UP (ref 32–36)
MCHC RBC-ENTMCNC: 33.3 GM/DL — SIGNIFICANT CHANGE UP (ref 32–36)
MCV RBC AUTO: 89.1 FL — SIGNIFICANT CHANGE UP (ref 80–100)
MCV RBC AUTO: 89.1 FL — SIGNIFICANT CHANGE UP (ref 80–100)
NRBC # BLD: 0 /100 WBCS — SIGNIFICANT CHANGE UP (ref 0–0)
NRBC # BLD: 0 /100 WBCS — SIGNIFICANT CHANGE UP (ref 0–0)
PLATELET # BLD AUTO: 397 K/UL — SIGNIFICANT CHANGE UP (ref 150–400)
PLATELET # BLD AUTO: 397 K/UL — SIGNIFICANT CHANGE UP (ref 150–400)
POTASSIUM SERPL-MCNC: 3.8 MMOL/L — SIGNIFICANT CHANGE UP (ref 3.5–5.3)
POTASSIUM SERPL-MCNC: 3.8 MMOL/L — SIGNIFICANT CHANGE UP (ref 3.5–5.3)
POTASSIUM SERPL-SCNC: 3.8 MMOL/L — SIGNIFICANT CHANGE UP (ref 3.5–5.3)
POTASSIUM SERPL-SCNC: 3.8 MMOL/L — SIGNIFICANT CHANGE UP (ref 3.5–5.3)
PROT SERPL-MCNC: 6.8 G/DL — SIGNIFICANT CHANGE UP (ref 6–8.3)
PROT SERPL-MCNC: 6.8 G/DL — SIGNIFICANT CHANGE UP (ref 6–8.3)
QUANT TB PLUS MITOGEN MINUS NIL: 9.51 IU/ML — SIGNIFICANT CHANGE UP
QUANT TB PLUS MITOGEN MINUS NIL: 9.51 IU/ML — SIGNIFICANT CHANGE UP
RBC # BLD: 2.93 M/UL — LOW (ref 3.8–5.2)
RBC # BLD: 2.93 M/UL — LOW (ref 3.8–5.2)
RBC # FLD: 14.6 % — HIGH (ref 10.3–14.5)
RBC # FLD: 14.6 % — HIGH (ref 10.3–14.5)
SODIUM SERPL-SCNC: 138 MMOL/L — SIGNIFICANT CHANGE UP (ref 135–145)
SODIUM SERPL-SCNC: 138 MMOL/L — SIGNIFICANT CHANGE UP (ref 135–145)
SPECIMEN SOURCE: SIGNIFICANT CHANGE UP
SPECIMEN SOURCE: SIGNIFICANT CHANGE UP
WBC # BLD: 13.94 K/UL — HIGH (ref 3.8–10.5)
WBC # BLD: 13.94 K/UL — HIGH (ref 3.8–10.5)
WBC # FLD AUTO: 13.94 K/UL — HIGH (ref 3.8–10.5)
WBC # FLD AUTO: 13.94 K/UL — HIGH (ref 3.8–10.5)

## 2024-01-11 RX ADMIN — HEPARIN SODIUM 5000 UNIT(S): 5000 INJECTION INTRAVENOUS; SUBCUTANEOUS at 21:49

## 2024-01-11 RX ADMIN — Medication 3: at 08:07

## 2024-01-11 RX ADMIN — Medication 2: at 21:54

## 2024-01-11 RX ADMIN — CEFTRIAXONE 100 MILLIGRAM(S): 500 INJECTION, POWDER, FOR SOLUTION INTRAMUSCULAR; INTRAVENOUS at 10:58

## 2024-01-11 RX ADMIN — SODIUM CHLORIDE 75 MILLILITER(S): 9 INJECTION INTRAMUSCULAR; INTRAVENOUS; SUBCUTANEOUS at 05:36

## 2024-01-11 RX ADMIN — Medication 500 MILLIGRAM(S): at 05:35

## 2024-01-11 RX ADMIN — Medication 3 MILLILITER(S): at 00:57

## 2024-01-11 RX ADMIN — MIRTAZAPINE 15 MILLIGRAM(S): 45 TABLET, ORALLY DISINTEGRATING ORAL at 21:46

## 2024-01-11 RX ADMIN — Medication 4 UNIT(S): at 08:07

## 2024-01-11 RX ADMIN — INSULIN GLARGINE 12 UNIT(S): 100 INJECTION, SOLUTION SUBCUTANEOUS at 08:08

## 2024-01-11 RX ADMIN — Medication 3 MILLILITER(S): at 20:25

## 2024-01-11 RX ADMIN — Medication 4 UNIT(S): at 12:00

## 2024-01-11 RX ADMIN — Medication 3 MILLILITER(S): at 14:16

## 2024-01-11 RX ADMIN — SODIUM CHLORIDE 75 MILLILITER(S): 9 INJECTION INTRAMUSCULAR; INTRAVENOUS; SUBCUTANEOUS at 21:55

## 2024-01-11 RX ADMIN — HEPARIN SODIUM 5000 UNIT(S): 5000 INJECTION INTRAVENOUS; SUBCUTANEOUS at 05:36

## 2024-01-11 RX ADMIN — QUETIAPINE FUMARATE 25 MILLIGRAM(S): 200 TABLET, FILM COATED ORAL at 21:46

## 2024-01-11 NOTE — PROGRESS NOTE ADULT - SUBJECTIVE AND OBJECTIVE BOX
[facility]  Mitchell Cantrell PA-C  [address] [place] [address]  [phone]      INTERVAL HPI/OVERNIGHT EVENTS:  Pt s/e  No new GI events    MEDICATIONS  (STANDING):  albuterol/ipratropium for Nebulization 3 milliLiter(s) Nebulizer every 6 hours  cefTRIAXone   IVPB 1000 milliGRAM(s) IV Intermittent every 24 hours  dextrose 5%. 1000 milliLiter(s) (100 mL/Hr) IV Continuous <Continuous>  dextrose 5%. 1000 milliLiter(s) (50 mL/Hr) IV Continuous <Continuous>  dextrose 50% Injectable 12.5 Gram(s) IV Push once  dextrose 50% Injectable 25 Gram(s) IV Push once  dextrose 50% Injectable 25 Gram(s) IV Push once  glucagon  Injectable 1 milliGRAM(s) IntraMuscular once  heparin   Injectable 5000 Unit(s) SubCutaneous every 8 hours  influenza  Vaccine (HIGH DOSE) 0.7 milliLiter(s) IntraMuscular once  insulin glargine Injectable (LANTUS) 12 Unit(s) SubCutaneous every morning  insulin lispro (ADMELOG) corrective regimen sliding scale   SubCutaneous three times a day before meals  insulin lispro (ADMELOG) corrective regimen sliding scale   SubCutaneous at bedtime  insulin lispro Injectable (ADMELOG) 4 Unit(s) SubCutaneous three times a day before meals  mirtazapine Soltab 15 milliGRAM(s) Oral at bedtime  QUEtiapine 25 milliGRAM(s) Oral at bedtime  sodium chloride 0.9%. 1000 milliLiter(s) (75 mL/Hr) IV Continuous <Continuous>    MEDICATIONS  (PRN):  acetaminophen     Tablet .. 650 milliGRAM(s) Oral every 6 hours PRN Temp greater or equal to 38C (100.4F), Mild Pain (1 - 3)  dextrose Oral Gel 15 Gram(s) Oral once PRN Blood Glucose LESS THAN 70 milliGRAM(s)/deciliter  guaiFENesin Oral Liquid (Sugar-Free) 200 milliGRAM(s) Oral every 6 hours PRN Cough      Allergies    No Known Allergies      PHYSICAL EXAM:   Vital Signs:  Vital Signs Last 24 Hrs  T(C): 37.6 (2024 05:02), Max: 37.6 (10 Jarivs 2024 21:30)  T(F): 99.7 (2024 05:02), Max: 99.7 (2024 05:02)  HR: 103 (2024 05:02) (99 - 115)  BP: 134/71 (2024 05:02) (124/67 - 156/75)  BP(mean): --  RR: 18 (2024 05:02) (17 - 18)  SpO2: 95% (2024 05:02) (92% - 99%)    Parameters below as of 2024 05:02  Patient On (Oxygen Delivery Method): room air      Daily     Daily Weight in k.5 (2024 05:02)    GENERAL:  Appears stated age  HEENT:  NC/AT  CHEST:  Full & symmetric excursion  HEART:  Regular rhythm  ABDOMEN:  Soft, non-tender, non-distended  EXTEREMITIES:  no cyanosis  SKIN:  No rash  NEURO:  Alert      LABS:                        8.3    16.34 )-----------( 224      ( 2024 11:15 )             24.7     01-09    137  |  106  |  28<H>  ----------------------------<  203<H>  4.5   |  25  |  1.00    Ca    8.3<L>      2024 11:15    TPro  6.9  /  Alb  2.3<L>  /  TBili  0.5  /  DBili  x   /  AST  28  /  ALT  16  /  AlkPhos  114        Urinalysis Basic - ( 2024 22:30 )    Color: Yellow / Appearance: Clear / S.042 / pH: x  Gluc: x / Ketone: Negative mg/dL  / Bili: Negative / Urobili: 0.2 mg/dL   Blood: x / Protein: 100 mg/dL / Nitrite: Negative   Leuk Esterase: Small / RBC: 4 /HPF / WBC 22 /HPF   Sq Epi: x / Non Sq Epi: x / Bacteria: Occasional /HPF     [facility]  Mitchell Cantrell PA-C  [address] [place] [address]  [phone]      INTERVAL HPI/OVERNIGHT EVENTS:  Pt s/e  No new GI events    MEDICATIONS  (STANDING):  albuterol/ipratropium for Nebulization 3 milliLiter(s) Nebulizer every 6 hours  cefTRIAXone   IVPB 1000 milliGRAM(s) IV Intermittent every 24 hours  dextrose 5%. 1000 milliLiter(s) (100 mL/Hr) IV Continuous <Continuous>  dextrose 5%. 1000 milliLiter(s) (50 mL/Hr) IV Continuous <Continuous>  dextrose 50% Injectable 12.5 Gram(s) IV Push once  dextrose 50% Injectable 25 Gram(s) IV Push once  dextrose 50% Injectable 25 Gram(s) IV Push once  glucagon  Injectable 1 milliGRAM(s) IntraMuscular once  heparin   Injectable 5000 Unit(s) SubCutaneous every 8 hours  influenza  Vaccine (HIGH DOSE) 0.7 milliLiter(s) IntraMuscular once  insulin glargine Injectable (LANTUS) 12 Unit(s) SubCutaneous every morning  insulin lispro (ADMELOG) corrective regimen sliding scale   SubCutaneous three times a day before meals  insulin lispro (ADMELOG) corrective regimen sliding scale   SubCutaneous at bedtime  insulin lispro Injectable (ADMELOG) 4 Unit(s) SubCutaneous three times a day before meals  mirtazapine Soltab 15 milliGRAM(s) Oral at bedtime  QUEtiapine 25 milliGRAM(s) Oral at bedtime  sodium chloride 0.9%. 1000 milliLiter(s) (75 mL/Hr) IV Continuous <Continuous>    MEDICATIONS  (PRN):  acetaminophen     Tablet .. 650 milliGRAM(s) Oral every 6 hours PRN Temp greater or equal to 38C (100.4F), Mild Pain (1 - 3)  dextrose Oral Gel 15 Gram(s) Oral once PRN Blood Glucose LESS THAN 70 milliGRAM(s)/deciliter  guaiFENesin Oral Liquid (Sugar-Free) 200 milliGRAM(s) Oral every 6 hours PRN Cough      Allergies    No Known Allergies      PHYSICAL EXAM:   Vital Signs:  Vital Signs Last 24 Hrs  T(C): 37.6 (2024 05:02), Max: 37.6 (10 Jarvis 2024 21:30)  T(F): 99.7 (2024 05:02), Max: 99.7 (2024 05:02)  HR: 103 (2024 05:02) (99 - 115)  BP: 134/71 (2024 05:02) (124/67 - 156/75)  BP(mean): --  RR: 18 (2024 05:02) (17 - 18)  SpO2: 95% (2024 05:02) (92% - 99%)    Parameters below as of 2024 05:02  Patient On (Oxygen Delivery Method): room air      Daily     Daily Weight in k.5 (2024 05:02)    GENERAL:  Appears stated age  HEENT:  NC/AT  CHEST:  Full & symmetric excursion  HEART:  Regular rhythm  ABDOMEN:  Soft, non-tender, non-distended  EXTEREMITIES:  no cyanosis  SKIN:  No rash  NEURO:  Alert      LABS:                        8.3    16.34 )-----------( 224      ( 2024 11:15 )             24.7     01-09    137  |  106  |  28<H>  ----------------------------<  203<H>  4.5   |  25  |  1.00    Ca    8.3<L>      2024 11:15    TPro  6.9  /  Alb  2.3<L>  /  TBili  0.5  /  DBili  x   /  AST  28  /  ALT  16  /  AlkPhos  114        Urinalysis Basic - ( 2024 22:30 )    Color: Yellow / Appearance: Clear / S.042 / pH: x  Gluc: x / Ketone: Negative mg/dL  / Bili: Negative / Urobili: 0.2 mg/dL   Blood: x / Protein: 100 mg/dL / Nitrite: Negative   Leuk Esterase: Small / RBC: 4 /HPF / WBC 22 /HPF   Sq Epi: x / Non Sq Epi: x / Bacteria: Occasional /HPF

## 2024-01-11 NOTE — PROGRESS NOTE ADULT - ASSESSMENT
83-year-old female PMH of DM2, anxiety/depression, chronic joint and back pain, osteoarthritis, GERD, anemia, ex-smoker dementia, presents to the emergency department with son sent by urgent care by ambulance with report of pneumonia.    pna  necrotizing pna?  anxiety  depression  OP  OA  DM  GERD  Anemia  Dementia  Ex smoker    zosyn  ID eval noted  w/u in progress  Strep Pna Ag positive    ID eval  CT chest reviewed  PNA eval - necrotizing pna reported on CT  biomarkers  cx -   sputum -   monitor VS and HD and Sat  legionella - strep Ag -   discussion about TB risk  cough rx regimen  bronchodilators for cough assist and mucociliary clearance  goal sat > 88 pct  Pall Eval - GOC discussion

## 2024-01-11 NOTE — PROGRESS NOTE ADULT - SUBJECTIVE AND OBJECTIVE BOX
[facility] Cardiology    CHIEF COMPLAINT: Patient is a 83y old  Female who presents with a chief complaint of fever (11 Jan 2024 11:12)      Follow Up: [ ] Chest Pain      [ ] Dyspnea     [ ] Palpitations    [ ] Atrial Fibrillation     [ ] Ventricular Dysrhythmia    [ ] Abnormal EKG                      [ ] Abnormal Cardiac Enzymes     [ ] Valvular Disease    HPI:  83-year-old female PMH of DM2, anxiety/depression, chronic joint and back pain, osteoarthritis, GERD, anemia, exsmoker dementia, presents to the emergency department with son sent by urgent care by ambulance with report of pneumonia.  Patient states for the past week patient has had dry cough, generalized weakness, no appetite, at urgent care patient flu/COVID was negative, concern for patient having 101 fever, low blood pressure, and right sided lung infiltrate.  Pt just came back from [place] and as per son the doctors there had stopped all her meds except Seroquel and mirtazapine , she is not on any meds for diabetes  (09 Jan 2024 07:42)    PAST MEDICAL & SURGICAL HISTORY:  Type 2 diabetes mellitus      Anxiety      OA (osteoarthritis) of knee  (Right knee)      Anemia      Other chronic pancreatitis      Anxiety and depression      Seasonal allergies      Diabetes mellitus, type 2      GERD (gastroesophageal reflux disease)      Chronic joint pain      S/P arthroscopic knee surgery  (Right knee, 2007)      History of back surgery  (2007)      S/P cholecystectomy  (2002)      S/P subtotal gastrectomy  (with gastrojejunal anastomosis, 30 years ago)      S/P knee replacement        MEDICATIONS  (STANDING):  albuterol/ipratropium for Nebulization 3 milliLiter(s) Nebulizer every 6 hours  cefTRIAXone   IVPB 1000 milliGRAM(s) IV Intermittent every 24 hours  dextrose 5%. 1000 milliLiter(s) (50 mL/Hr) IV Continuous <Continuous>  dextrose 5%. 1000 milliLiter(s) (100 mL/Hr) IV Continuous <Continuous>  dextrose 50% Injectable 12.5 Gram(s) IV Push once  dextrose 50% Injectable 25 Gram(s) IV Push once  dextrose 50% Injectable 25 Gram(s) IV Push once  glucagon  Injectable 1 milliGRAM(s) IntraMuscular once  heparin   Injectable 5000 Unit(s) SubCutaneous every 8 hours  influenza  Vaccine (HIGH DOSE) 0.7 milliLiter(s) IntraMuscular once  insulin glargine Injectable (LANTUS) 12 Unit(s) SubCutaneous every morning  insulin lispro (ADMELOG) corrective regimen sliding scale   SubCutaneous three times a day before meals  insulin lispro (ADMELOG) corrective regimen sliding scale   SubCutaneous at bedtime  insulin lispro Injectable (ADMELOG) 4 Unit(s) SubCutaneous three times a day before meals  mirtazapine Soltab 15 milliGRAM(s) Oral at bedtime  QUEtiapine 25 milliGRAM(s) Oral at bedtime  sodium chloride 0.9%. 1000 milliLiter(s) (75 mL/Hr) IV Continuous <Continuous>    MEDICATIONS  (PRN):  acetaminophen     Tablet .. 650 milliGRAM(s) Oral every 6 hours PRN Temp greater or equal to 38C (100.4F), Mild Pain (1 - 3)  dextrose Oral Gel 15 Gram(s) Oral once PRN Blood Glucose LESS THAN 70 milliGRAM(s)/deciliter  guaiFENesin Oral Liquid (Sugar-Free) 200 milliGRAM(s) Oral every 6 hours PRN Cough    Allergies    No Known Allergies    Intolerances        REVIEW OF SYSTEMS:    CONSTITUTIONAL: No weakness, fevers or chills.   EYES/ENT: No visual changes;    NECK: No pain or stiffness  RESPIRATORY: No cough, wheezing, No shortness of breath  CARDIOVASCULAR: No chest pain or palpitations  GASTROINTESTINAL: No abdominal pain, or hematochezia.  GENITOURINARY: No dysuria orhematuria  NEUROLOGICAL: No numbness or weakness  SKIN: No itching, burning, rashes  All other review of systems is negative unless indicated above    Vital Signs Last 24 Hrs  T(C): 37.6 (11 Jan 2024 05:02), Max: 37.6 (10 Jarvis 2024 21:30)  T(F): 99.7 (11 Jan 2024 05:02), Max: 99.7 (11 Jan 2024 05:02)  HR: 103 (11 Jan 2024 05:02) (101 - 115)  BP: 134/71 (11 Jan 2024 05:02) (124/67 - 134/71)  BP(mean): --  RR: 18 (11 Jan 2024 05:02) (18 - 18)  SpO2: 95% (11 Jan 2024 05:02) (92% - 99%)    Parameters below as of 11 Jan 2024 05:02  Patient On (Oxygen Delivery Method): room air      I&O's Summary    10 Jarvis 2024 07:01  -  11 Jan 2024 07:00  --------------------------------------------------------  IN: 200 mL / OUT: 0 mL / NET: 200 mL        PHYSICAL EXAM:  Constitutional: NAD  Neurological: Alert, no focal deficits  HEENT: no JVD, EOMI  Cardiovascular: Regular, S1 and S2, no murmur  Pulmonary: breath sounds bilaterally  Gastrointestinal: Bowel Sounds present, soft, nontender  EXT:  no peripheral edema  Skin: No rashes.  Psych:  Mood calm  LABS: All Labs Reviewed:                          8.7    13.94 )-----------( 397      ( 11 Jan 2024 09:45 )             26.1     01-11    138  |  107  |  15  ----------------------------<  135<H>  3.8   |  25  |  0.87    Ca    8.3<L>      11 Jan 2024 09:45    TPro  6.8  /  Alb  2.2<L>  /  TBili  0.4  /  DBili  x   /  AST  15  /  ALT  17  /  AlkPhos  107  01-11          12 Lead ECG:   Ventricular Rate 85 BPM    Atrial Rate 85 BPM    P-R Interval 142 ms    QRS Duration 78 ms    Q-T Interval 374 ms    QTC Calculation(Bazett) 445 ms    P Axis 59 degrees    R Axis 2 degrees    T Axis 48 degrees    Diagnosis Line Normal sinus rhythm  Normal ECG  When compared with ECG of 03-MAY-2021 16:15,  No significant change was found  Confirmed by LYSSA OLIVER ([id number]) on 1/9/2024 6:12:05 PM (01-09-24 @ 02:16)  CT Chest w/ IV Cont:   ACC: [id number] EXAM:  CT ABDOMEN AND PELVIS IC   ORDERED BY: BERTHA GAITAN     ACC: [id number] EXAM:  CT CHEST IC   ORDERED BY: BERTHA GAITAN     PROCEDURE DATE:  01/08/2024          INTERPRETATION:  CLINICAL INFORMATION: Not eating.    COMPARISON: None.    CONTRAST/COMPLICATIONS:  IV Contrast: Omnipaque 350 (accession [id number]), IV contrast documented   in unlinked concurrent exam (accession [id number])  90 cc administered   10   cc discarded  Oral Contrast: NONE  Complications: None reported at time of study completion    PROCEDURE:  CT of the Chest, Abdomen and Pelvis was performed.  Sagittal and coronal reformats were performed.    FINDINGS:  CHEST:  LUNGS AND LARGE AIRWAYS: Extensive consolidation of the lateral segment   of theright middle lobe with associated cavitary lesions; most   concerning for necrotizing pneumonia. No sizable pulmonary nodules.  PLEURA: No pleural effusion.  VESSELS: Calcified atherosclerosis of the aorta and coronary arteries.  HEART: Heart size isnormal. No pericardial effusion.  MEDIASTINUM AND SERINA: No lymphadenopathy.  CHEST WALL AND LOWER NECK: Within normal limits.    ABDOMEN AND PELVIS:  LIVER: Within normal limits.  BILE DUCTS: Intrahepatic biliary duct dilatation noted. CBD dilatation  measuring up to 1.1 cm.  GALLBLADDER: Cholecystectomy.  SPLEEN: Within normal limits.  PANCREAS: Pancreatic duct dilatation measuring up to 0.6 cm.  ADRENALS: Within normal limits.  KIDNEYS/URETERS: Within normal limits.    BLADDER: Within normal limits.  REPRODUCTIVE ORGANS: Hysterectomy.    BOWEL: No bowel obstruction. Appendix is not visualized. No evidence of   inflammation in the pericecal region.  PERITONEUM: No ascites.  VESSELS: Calcified atherosclerosis of the aorta and some of its branches.  RETROPERITONEUM/LYMPH NODES: No lymphadenopathy.  ABDOMINAL WALL: Sizable fat-containing ventral hernia.  BONES: Stable.    IMPRESSION:    1.  Necrotizing right middle lobe pneumonia.  2.  Dilated common biliary and pancreatic ducts. There is no   discrete/obvious distal obstructive lesion.    Correlation with MRCP may be useful. See above text for additional   findings, and more detailed explanations.        --- End of Report ---            SILVINO REYNOSO MD; Attending Radiologist  This document has been electronically signed. Jan 8 2024 11:40PM (01-08-24 @ 23:23)  Xray Chest 1 View- PORTABLE-Urgent:   ACC: [id number] EXAM:  XR CHEST PORTABLE URGENT 1V   ORDERED BY: BERTHA GAITAN     PROCEDURE DATE:  01/08/2024          INTERPRETATION:  Portable chest radiograph    CLINICAL INFORMATION: Fever and cough.    TECHNIQUE:  Portable  AP view of the chest.    COMPARISON: Same day chest CT available for review.    FINDINGS:    RIGHT mid zone airspace consolidation. Remaining visualized lung   parenchyma clear.  No pneumothorax.    The heart and mediastinum size and configuration are within normal limits.    Visualized osseous thorax intact.        IMPRESSION:   RIGHT mid zone airspace consolidation.    Please see same day chest CT..    --- End of Report ---    ALEX IYER MD; Attending Radiologist  This document has been electronically signed. Jan 9 2024 10:59AM (01-08-24 @ 21:48)      Assessment and Plan:   · Assessment    83F with DM2, GERD, dementia who presents with SOB, fever, cough and generalized weakness found with PNA on CT. ECG is normal.  Doubt ACS    Suggest:    1. PNA  - Treat with Abx    2. Multiple CAD risk factors  - Recommend Lipitor 20mg daily    3. Pt very agitated during last night.    in hallway    4. Anemia, 6.8 --> 8.3 .  No plan for endoscopy at this time.    Will follow prn.      [facility] Cardiology    CHIEF COMPLAINT: Patient is a 83y old  Female who presents with a chief complaint of fever (11 Jan 2024 11:12)      Follow Up: [ ] Chest Pain      [ ] Dyspnea     [ ] Palpitations    [ ] Atrial Fibrillation     [ ] Ventricular Dysrhythmia    [ ] Abnormal EKG                      [ ] Abnormal Cardiac Enzymes     [ ] Valvular Disease    HPI:  83-year-old female PMH of DM2, anxiety/depression, chronic joint and back pain, osteoarthritis, GERD, anemia, exsmoker dementia, presents to the emergency department with son sent by urgent care by ambulance with report of pneumonia.  Patient states for the past week patient has had dry cough, generalized weakness, no appetite, at urgent care patient flu/COVID was negative, concern for patient having 101 fever, low blood pressure, and right sided lung infiltrate.  Pt just came back from [place] and as per son the doctors there had stopped all her meds except Seroquel and mirtazapine , she is not on any meds for diabetes  (09 Jan 2024 07:42)    PAST MEDICAL & SURGICAL HISTORY:  Type 2 diabetes mellitus      Anxiety      OA (osteoarthritis) of knee  (Right knee)      Anemia      Other chronic pancreatitis      Anxiety and depression      Seasonal allergies      Diabetes mellitus, type 2      GERD (gastroesophageal reflux disease)      Chronic joint pain      S/P arthroscopic knee surgery  (Right knee, 2007)      History of back surgery  (2007)      S/P cholecystectomy  (2002)      S/P subtotal gastrectomy  (with gastrojejunal anastomosis, 30 years ago)      S/P knee replacement        MEDICATIONS  (STANDING):  albuterol/ipratropium for Nebulization 3 milliLiter(s) Nebulizer every 6 hours  cefTRIAXone   IVPB 1000 milliGRAM(s) IV Intermittent every 24 hours  dextrose 5%. 1000 milliLiter(s) (50 mL/Hr) IV Continuous <Continuous>  dextrose 5%. 1000 milliLiter(s) (100 mL/Hr) IV Continuous <Continuous>  dextrose 50% Injectable 12.5 Gram(s) IV Push once  dextrose 50% Injectable 25 Gram(s) IV Push once  dextrose 50% Injectable 25 Gram(s) IV Push once  glucagon  Injectable 1 milliGRAM(s) IntraMuscular once  heparin   Injectable 5000 Unit(s) SubCutaneous every 8 hours  influenza  Vaccine (HIGH DOSE) 0.7 milliLiter(s) IntraMuscular once  insulin glargine Injectable (LANTUS) 12 Unit(s) SubCutaneous every morning  insulin lispro (ADMELOG) corrective regimen sliding scale   SubCutaneous three times a day before meals  insulin lispro (ADMELOG) corrective regimen sliding scale   SubCutaneous at bedtime  insulin lispro Injectable (ADMELOG) 4 Unit(s) SubCutaneous three times a day before meals  mirtazapine Soltab 15 milliGRAM(s) Oral at bedtime  QUEtiapine 25 milliGRAM(s) Oral at bedtime  sodium chloride 0.9%. 1000 milliLiter(s) (75 mL/Hr) IV Continuous <Continuous>    MEDICATIONS  (PRN):  acetaminophen     Tablet .. 650 milliGRAM(s) Oral every 6 hours PRN Temp greater or equal to 38C (100.4F), Mild Pain (1 - 3)  dextrose Oral Gel 15 Gram(s) Oral once PRN Blood Glucose LESS THAN 70 milliGRAM(s)/deciliter  guaiFENesin Oral Liquid (Sugar-Free) 200 milliGRAM(s) Oral every 6 hours PRN Cough    Allergies    No Known Allergies    Intolerances        REVIEW OF SYSTEMS:    CONSTITUTIONAL: No weakness, fevers or chills.   EYES/ENT: No visual changes;    NECK: No pain or stiffness  RESPIRATORY: No cough, wheezing, No shortness of breath  CARDIOVASCULAR: No chest pain or palpitations  GASTROINTESTINAL: No abdominal pain, or hematochezia.  GENITOURINARY: No dysuria orhematuria  NEUROLOGICAL: No numbness or weakness  SKIN: No itching, burning, rashes  All other review of systems is negative unless indicated above    Vital Signs Last 24 Hrs  T(C): 37.6 (11 Jan 2024 05:02), Max: 37.6 (10 Jarvis 2024 21:30)  T(F): 99.7 (11 Jan 2024 05:02), Max: 99.7 (11 Jan 2024 05:02)  HR: 103 (11 Jan 2024 05:02) (101 - 115)  BP: 134/71 (11 Jan 2024 05:02) (124/67 - 134/71)  BP(mean): --  RR: 18 (11 Jan 2024 05:02) (18 - 18)  SpO2: 95% (11 Jan 2024 05:02) (92% - 99%)    Parameters below as of 11 Jan 2024 05:02  Patient On (Oxygen Delivery Method): room air      I&O's Summary    10 Jarvis 2024 07:01  -  11 Jan 2024 07:00  --------------------------------------------------------  IN: 200 mL / OUT: 0 mL / NET: 200 mL        PHYSICAL EXAM:  Constitutional: NAD  Neurological: Alert, no focal deficits  HEENT: no JVD, EOMI  Cardiovascular: Regular, S1 and S2, no murmur  Pulmonary: breath sounds bilaterally  Gastrointestinal: Bowel Sounds present, soft, nontender  EXT:  no peripheral edema  Skin: No rashes.  Psych:  Mood calm  LABS: All Labs Reviewed:                          8.7    13.94 )-----------( 397      ( 11 Jan 2024 09:45 )             26.1     01-11    138  |  107  |  15  ----------------------------<  135<H>  3.8   |  25  |  0.87    Ca    8.3<L>      11 Jan 2024 09:45    TPro  6.8  /  Alb  2.2<L>  /  TBili  0.4  /  DBili  x   /  AST  15  /  ALT  17  /  AlkPhos  107  01-11          12 Lead ECG:   Ventricular Rate 85 BPM    Atrial Rate 85 BPM    P-R Interval 142 ms    QRS Duration 78 ms    Q-T Interval 374 ms    QTC Calculation(Bazett) 445 ms    P Axis 59 degrees    R Axis 2 degrees    T Axis 48 degrees    Diagnosis Line Normal sinus rhythm  Normal ECG  When compared with ECG of 03-MAY-2021 16:15,  No significant change was found  Confirmed by LYSSA OLIVER ([id number]) on 1/9/2024 6:12:05 PM (01-09-24 @ 02:16)  CT Chest w/ IV Cont:   ACC: [id number] EXAM:  CT ABDOMEN AND PELVIS IC   ORDERED BY: BERTHA GAITAN     ACC: [id number] EXAM:  CT CHEST IC   ORDERED BY: BERTHA GAITAN     PROCEDURE DATE:  01/08/2024          INTERPRETATION:  CLINICAL INFORMATION: Not eating.    COMPARISON: None.    CONTRAST/COMPLICATIONS:  IV Contrast: Omnipaque 350 (accession [id number]), IV contrast documented   in unlinked concurrent exam (accession [id number])  90 cc administered   10   cc discarded  Oral Contrast: NONE  Complications: None reported at time of study completion    PROCEDURE:  CT of the Chest, Abdomen and Pelvis was performed.  Sagittal and coronal reformats were performed.    FINDINGS:  CHEST:  LUNGS AND LARGE AIRWAYS: Extensive consolidation of the lateral segment   of theright middle lobe with associated cavitary lesions; most   concerning for necrotizing pneumonia. No sizable pulmonary nodules.  PLEURA: No pleural effusion.  VESSELS: Calcified atherosclerosis of the aorta and coronary arteries.  HEART: Heart size isnormal. No pericardial effusion.  MEDIASTINUM AND SERINA: No lymphadenopathy.  CHEST WALL AND LOWER NECK: Within normal limits.    ABDOMEN AND PELVIS:  LIVER: Within normal limits.  BILE DUCTS: Intrahepatic biliary duct dilatation noted. CBD dilatation  measuring up to 1.1 cm.  GALLBLADDER: Cholecystectomy.  SPLEEN: Within normal limits.  PANCREAS: Pancreatic duct dilatation measuring up to 0.6 cm.  ADRENALS: Within normal limits.  KIDNEYS/URETERS: Within normal limits.    BLADDER: Within normal limits.  REPRODUCTIVE ORGANS: Hysterectomy.    BOWEL: No bowel obstruction. Appendix is not visualized. No evidence of   inflammation in the pericecal region.  PERITONEUM: No ascites.  VESSELS: Calcified atherosclerosis of the aorta and some of its branches.  RETROPERITONEUM/LYMPH NODES: No lymphadenopathy.  ABDOMINAL WALL: Sizable fat-containing ventral hernia.  BONES: Stable.    IMPRESSION:    1.  Necrotizing right middle lobe pneumonia.  2.  Dilated common biliary and pancreatic ducts. There is no   discrete/obvious distal obstructive lesion.    Correlation with MRCP may be useful. See above text for additional   findings, and more detailed explanations.        --- End of Report ---            SILVINO REYNOSO MD; Attending Radiologist  This document has been electronically signed. Jan 8 2024 11:40PM (01-08-24 @ 23:23)  Xray Chest 1 View- PORTABLE-Urgent:   ACC: [id number] EXAM:  XR CHEST PORTABLE URGENT 1V   ORDERED BY: BERTHA GAITAN     PROCEDURE DATE:  01/08/2024          INTERPRETATION:  Portable chest radiograph    CLINICAL INFORMATION: Fever and cough.    TECHNIQUE:  Portable  AP view of the chest.    COMPARISON: Same day chest CT available for review.    FINDINGS:    RIGHT mid zone airspace consolidation. Remaining visualized lung   parenchyma clear.  No pneumothorax.    The heart and mediastinum size and configuration are within normal limits.    Visualized osseous thorax intact.        IMPRESSION:   RIGHT mid zone airspace consolidation.    Please see same day chest CT..    --- End of Report ---    ALEX IYER MD; Attending Radiologist  This document has been electronically signed. Jan 9 2024 10:59AM (01-08-24 @ 21:48)      Assessment and Plan:   · Assessment    83F with DM2, GERD, dementia who presents with SOB, fever, cough and generalized weakness found with PNA on CT. ECG is normal.  Doubt ACS    Suggest:    1. PNA  - Treat with Abx    2. Multiple CAD risk factors  - Recommend Lipitor 20mg daily    3. Pt very agitated during last night.    in hallway    4. Anemia, 6.8 --> 8.3 .  No plan for endoscopy at this time.    Will follow prn.

## 2024-01-11 NOTE — PROGRESS NOTE ADULT - SUBJECTIVE AND OBJECTIVE BOX
[facility] of INFECTIOUS DISEASE  Osman Garcia MD PhD, Michelle Braga MD, Tammi Zhao MD, Kenton Coy MD, Jann Acosta MD  and providing coverage with Ana Ortiz MD  Providing Infectious Disease Consultations at [facility], [facility], [facility], [facility]'s    Office# [phone] to schedule follow up appointments  Answering Service for urgent calls or New Consults [phone]  Cell# to text for urgent issues Osman Garcia [phone]     infectious diseases progress note:    FRANKI VALVERDE is a 83y y. o. Female patient    Overnight and events of the last 24hrs reviewed    Allergies    No Known Allergies    Intolerances        ANTIBIOTICS/RELEVANT:  antimicrobials  cefTRIAXone   IVPB 1000 milliGRAM(s) IV Intermittent every 24 hours  metroNIDAZOLE    Tablet 500 milliGRAM(s) Oral every 8 hours    immunologic:  influenza  Vaccine (HIGH DOSE) 0.7 milliLiter(s) IntraMuscular once    OTHER:  acetaminophen     Tablet .. 650 milliGRAM(s) Oral every 6 hours PRN  albuterol/ipratropium for Nebulization 3 milliLiter(s) Nebulizer every 6 hours  dextrose 5%. 1000 milliLiter(s) IV Continuous <Continuous>  dextrose 5%. 1000 milliLiter(s) IV Continuous <Continuous>  dextrose 50% Injectable 12.5 Gram(s) IV Push once  dextrose 50% Injectable 25 Gram(s) IV Push once  dextrose 50% Injectable 25 Gram(s) IV Push once  dextrose Oral Gel 15 Gram(s) Oral once PRN  glucagon  Injectable 1 milliGRAM(s) IntraMuscular once  guaiFENesin Oral Liquid (Sugar-Free) 200 milliGRAM(s) Oral every 6 hours PRN  heparin   Injectable 5000 Unit(s) SubCutaneous every 8 hours  insulin glargine Injectable (LANTUS) 12 Unit(s) SubCutaneous every morning  insulin lispro (ADMELOG) corrective regimen sliding scale   SubCutaneous three times a day before meals  insulin lispro (ADMELOG) corrective regimen sliding scale   SubCutaneous at bedtime  insulin lispro Injectable (ADMELOG) 4 Unit(s) SubCutaneous three times a day before meals  mirtazapine Soltab 15 milliGRAM(s) Oral at bedtime  QUEtiapine 25 milliGRAM(s) Oral at bedtime  sodium chloride 0.9%. 1000 milliLiter(s) IV Continuous <Continuous>      Objective:  Vital Signs Last 24 Hrs  T(C): 37.6 (2024 05:02), Max: 37.6 (10 Jarvis 2024 21:30)  T(F): 99.7 (2024 05:02), Max: 99.7 (2024 05:02)  HR: 103 (2024 05:02) (99 - 115)  BP: 134/71 (2024 05:02) (124/67 - 156/75)  BP(mean): --  RR: 18 (2024 05:02) (17 - 18)  SpO2: 95% (2024 05:02) (92% - 99%)    Parameters below as of 2024 05:02  Patient On (Oxygen Delivery Method): room air        T(C): 37.6 (24 @ 05:02), Max: 37.6 (01-10-24 @ 21:30)  T(C): 37.6 (24 @ 05:02), Max: 37.6 (01-10-24 @ 21:30)  T(C): 37.6 (24 @ 05:02), Max: 37.6 (01-10-24 @ 21:30)    PHYSICAL EXAM:  HEENT: NC atraumatic  Neck: supple  Respiratory: no accessory muscle use, breathing comfortably, less crackles, improved exam  Cardiovascular: distant  Gastrointestinal: normal appearing, nondistended  Extremities: no clubbing, no cyanosis,        LABS:                          8.3    16.34 )-----------( 224      ( 2024 11:15 )             24.7       WBC  16.34  @ 11:15  16.10 08 @ 22:05          137  |  106  |  28<H>  ----------------------------<  203<H>  4.5   |  25  |  1.00    Ca    8.3<L>      2024 11:15    TPro  6.9  /  Alb  2.3<L>  /  TBili  0.5  /  DBili  x   /  AST  28  /  ALT  16  /  AlkPhos  114        Creatinine: 1.00 mg/dL (24 @ 11:15)  Creatinine: 1.60 mg/dL (24 @ 22:05)        Urinalysis Basic - ( 2024 22:30 )    Color: Yellow / Appearance: Clear / S.042 / pH: x  Gluc: x / Ketone: Negative mg/dL  / Bili: Negative / Urobili: 0.2 mg/dL   Blood: x / Protein: 100 mg/dL / Nitrite: Negative   Leuk Esterase: Small / RBC: 4 /HPF / WBC 22 /HPF   Sq Epi: x / Non Sq Epi: x / Bacteria: Occasional /HPF            INFLAMMATORY MARKERS      MICROBIOLOGY:    Streptococcus pneumoniae Ag, Ur (01.10.24 @ 07:25)    Streptococcus pneumoniae Ag, Ur Result: Positive: Testing method: Immunochromatographic Assay  Result: Positive    RADIOLOGY & ADDITIONAL STUDIES:   [facility] INFECTIOUS DISEASE  Osman Garcia MD PhD, Michelle Braga MD, Tammi Zhao MD, Kenton Coy MD, Jann Acosta MD  and providing coverage with Ana Ortiz MD  Providing Infectious Disease Consultations at [facility], [facility], [facility], [facility]'s    Office# [phone] to schedule follow up appointments  Answering Service for urgent calls or New Consults [phone]  Cell# to text for urgent issues Osman Garcia [phone]     infectious diseases progress note:    FRANKI VALVERDE is a 83y y. o. Female patient    Overnight and events of the last 24hrs reviewed    Allergies    No Known Allergies    Intolerances        ANTIBIOTICS/RELEVANT:  antimicrobials  cefTRIAXone   IVPB 1000 milliGRAM(s) IV Intermittent every 24 hours  metroNIDAZOLE    Tablet 500 milliGRAM(s) Oral every 8 hours    immunologic:  influenza  Vaccine (HIGH DOSE) 0.7 milliLiter(s) IntraMuscular once    OTHER:  acetaminophen     Tablet .. 650 milliGRAM(s) Oral every 6 hours PRN  albuterol/ipratropium for Nebulization 3 milliLiter(s) Nebulizer every 6 hours  dextrose 5%. 1000 milliLiter(s) IV Continuous <Continuous>  dextrose 5%. 1000 milliLiter(s) IV Continuous <Continuous>  dextrose 50% Injectable 12.5 Gram(s) IV Push once  dextrose 50% Injectable 25 Gram(s) IV Push once  dextrose 50% Injectable 25 Gram(s) IV Push once  dextrose Oral Gel 15 Gram(s) Oral once PRN  glucagon  Injectable 1 milliGRAM(s) IntraMuscular once  guaiFENesin Oral Liquid (Sugar-Free) 200 milliGRAM(s) Oral every 6 hours PRN  heparin   Injectable 5000 Unit(s) SubCutaneous every 8 hours  insulin glargine Injectable (LANTUS) 12 Unit(s) SubCutaneous every morning  insulin lispro (ADMELOG) corrective regimen sliding scale   SubCutaneous three times a day before meals  insulin lispro (ADMELOG) corrective regimen sliding scale   SubCutaneous at bedtime  insulin lispro Injectable (ADMELOG) 4 Unit(s) SubCutaneous three times a day before meals  mirtazapine Soltab 15 milliGRAM(s) Oral at bedtime  QUEtiapine 25 milliGRAM(s) Oral at bedtime  sodium chloride 0.9%. 1000 milliLiter(s) IV Continuous <Continuous>      Objective:  Vital Signs Last 24 Hrs  T(C): 37.6 (2024 05:02), Max: 37.6 (10 Jarvis 2024 21:30)  T(F): 99.7 (2024 05:02), Max: 99.7 (2024 05:02)  HR: 103 (2024 05:02) (99 - 115)  BP: 134/71 (2024 05:02) (124/67 - 156/75)  BP(mean): --  RR: 18 (2024 05:02) (17 - 18)  SpO2: 95% (2024 05:02) (92% - 99%)    Parameters below as of 2024 05:02  Patient On (Oxygen Delivery Method): room air        T(C): 37.6 (24 @ 05:02), Max: 37.6 (01-10-24 @ 21:30)  T(C): 37.6 (24 @ 05:02), Max: 37.6 (01-10-24 @ 21:30)  T(C): 37.6 (24 @ 05:02), Max: 37.6 (01-10-24 @ 21:30)    PHYSICAL EXAM:  HEENT: NC atraumatic  Neck: supple  Respiratory: no accessory muscle use, breathing comfortably, less crackles, improved exam  Cardiovascular: distant  Gastrointestinal: normal appearing, nondistended  Extremities: no clubbing, no cyanosis,        LABS:                          8.3    16.34 )-----------( 224      ( 2024 11:15 )             24.7       WBC  16.34  @ 11:15  16.10 08 @ 22:05          137  |  106  |  28<H>  ----------------------------<  203<H>  4.5   |  25  |  1.00    Ca    8.3<L>      2024 11:15    TPro  6.9  /  Alb  2.3<L>  /  TBili  0.5  /  DBili  x   /  AST  28  /  ALT  16  /  AlkPhos  114        Creatinine: 1.00 mg/dL (24 @ 11:15)  Creatinine: 1.60 mg/dL (24 @ 22:05)        Urinalysis Basic - ( 2024 22:30 )    Color: Yellow / Appearance: Clear / S.042 / pH: x  Gluc: x / Ketone: Negative mg/dL  / Bili: Negative / Urobili: 0.2 mg/dL   Blood: x / Protein: 100 mg/dL / Nitrite: Negative   Leuk Esterase: Small / RBC: 4 /HPF / WBC 22 /HPF   Sq Epi: x / Non Sq Epi: x / Bacteria: Occasional /HPF            INFLAMMATORY MARKERS      MICROBIOLOGY:    Streptococcus pneumoniae Ag, Ur (01.10.24 @ 07:25)    Streptococcus pneumoniae Ag, Ur Result: Positive: Testing method: Immunochromatographic Assay  Result: Positive    RADIOLOGY & ADDITIONAL STUDIES:

## 2024-01-11 NOTE — PROGRESS NOTE ADULT - SUBJECTIVE AND OBJECTIVE BOX
Date/Time Patient Seen:  		  Referring MD:   Data Reviewed	       Patient is a 83y old  Female who presents with a chief complaint of fever (10 Jarvis 2024 20:58)      Subjective/HPI     PAST MEDICAL & SURGICAL HISTORY:  DM (diabetes mellitus)    Type 2 diabetes mellitus    Anxiety    OA (osteoarthritis) of knee  (Right knee)    Anemia    Other chronic pancreatitis    DM (diabetes mellitus)    HLD (hyperlipidemia)    Anxiety and depression    Seasonal allergies    Diabetes mellitus, type 2    GERD (gastroesophageal reflux disease)    Chronic joint pain    S/P arthroscopic knee surgery  (Right knee, 2007)    History of back surgery  (2007)    S/P cholecystectomy  (2002)    S/P subtotal gastrectomy  (with gastrojejunal anastomosis, 30 years ago)    S/P knee replacement          Medication list         MEDICATIONS  (STANDING):  albuterol/ipratropium for Nebulization 3 milliLiter(s) Nebulizer every 6 hours  cefTRIAXone   IVPB 1000 milliGRAM(s) IV Intermittent every 24 hours  dextrose 5%. 1000 milliLiter(s) (50 mL/Hr) IV Continuous <Continuous>  dextrose 5%. 1000 milliLiter(s) (100 mL/Hr) IV Continuous <Continuous>  dextrose 50% Injectable 12.5 Gram(s) IV Push once  dextrose 50% Injectable 25 Gram(s) IV Push once  dextrose 50% Injectable 25 Gram(s) IV Push once  glucagon  Injectable 1 milliGRAM(s) IntraMuscular once  heparin   Injectable 5000 Unit(s) SubCutaneous every 8 hours  influenza  Vaccine (HIGH DOSE) 0.7 milliLiter(s) IntraMuscular once  insulin glargine Injectable (LANTUS) 12 Unit(s) SubCutaneous every morning  insulin lispro (ADMELOG) corrective regimen sliding scale   SubCutaneous three times a day before meals  insulin lispro (ADMELOG) corrective regimen sliding scale   SubCutaneous at bedtime  insulin lispro Injectable (ADMELOG) 4 Unit(s) SubCutaneous three times a day before meals  metroNIDAZOLE    Tablet 500 milliGRAM(s) Oral every 8 hours  mirtazapine Soltab 15 milliGRAM(s) Oral at bedtime  QUEtiapine 25 milliGRAM(s) Oral at bedtime  sodium chloride 0.9%. 1000 milliLiter(s) (75 mL/Hr) IV Continuous <Continuous>    MEDICATIONS  (PRN):  acetaminophen     Tablet .. 650 milliGRAM(s) Oral every 6 hours PRN Temp greater or equal to 38C (100.4F), Mild Pain (1 - 3)  dextrose Oral Gel 15 Gram(s) Oral once PRN Blood Glucose LESS THAN 70 milliGRAM(s)/deciliter  guaiFENesin Oral Liquid (Sugar-Free) 200 milliGRAM(s) Oral every 6 hours PRN Cough         Vitals log        ICU Vital Signs Last 24 Hrs  T(C): 37.6 (10 Jarvis 2024 21:30), Max: 37.6 (10 Jarvis 2024 21:30)  T(F): 99.6 (10 Jarvis 2024 21:30), Max: 99.6 (10 Jarvis 2024 21:30)  HR: 101 (11 Jan 2024 00:57) (88 - 115)  BP: 124/67 (10 Jarvis 2024 21:30) (124/67 - 156/75)  BP(mean): --  ABP: --  ABP(mean): --  RR: 18 (10 Jarvis 2024 21:30) (17 - 18)  SpO2: 95% (11 Jan 2024 00:57) (92% - 99%)    O2 Parameters below as of 11 Jan 2024 00:57  Patient On (Oxygen Delivery Method): room air                 Input and Output:  I&O's Detail    10 Jarvis 2024 07:01  -  11 Jan 2024 04:57  --------------------------------------------------------  IN:    IV PiggyBack: 50 mL    sodium chloride 0.9%: 150 mL  Total IN: 200 mL    OUT:  Total OUT: 0 mL    Total NET: 200 mL          Lab Data                        8.3    16.34 )-----------( 224      ( 09 Jan 2024 11:15 )             24.7     01-09    137  |  106  |  28<H>  ----------------------------<  203<H>  4.5   |  25  |  1.00    Ca    8.3<L>      09 Jan 2024 11:15    TPro  6.9  /  Alb  2.3<L>  /  TBili  0.5  /  DBili  x   /  AST  28  /  ALT  16  /  AlkPhos  114  01-09            Review of Systems	      Objective     Physical Examination    heart s1s2  lung dec BS  head nc      Pertinent Lab findings & Imaging      Shanta:  NO   Adequate UO     I&O's Detail    10 Jarvis 2024 07:01  -  11 Jan 2024 04:57  --------------------------------------------------------  IN:    IV PiggyBack: 50 mL    sodium chloride 0.9%: 150 mL  Total IN: 200 mL    OUT:  Total OUT: 0 mL    Total NET: 200 mL               Discussed with:     Cultures:	        Radiology

## 2024-01-11 NOTE — PROGRESS NOTE ADULT - ASSESSMENT
83-year-old female PMH of DM2, anxiety/depression, chronic joint and back pain, osteoarthritis, GERD, anemia, exsmoker dementia, presents to the emergency department with son sent by urgent care by ambulance with report of pneumonia.  Patient states for the past week patient has had dry cough, generalized weakness, no appetite, at urgent care patient flu/COVID was negative, concern for patient having 101 fever, low blood pressure, and right sided lung infiltrate.  Pt just came back from [place] and as per son the doctors there had stopped all her meds except Seroquel and mirtazapine , she is not on any meds for diabetes     1 Sepsis sec to pneumonia POA  - cw ceftriaxone  - fu cultures  - pulmonary fu   - ID fu     2 Uncontrolled DM  - pt not on any meds now as per son  -  basal, bolus  - monitor FS  - endo fu appreciated     3 Dementia  - cw mirtazapine  - cw Seroquel    4 Lovenox for DVT prophylaxis

## 2024-01-11 NOTE — PROGRESS NOTE ADULT - ASSESSMENT
83-year-old female PMH of DM2, anxiety/depression, chronic joint and back pain, osteoarthritis, GERD, anemia, exsmoker dementia, presents to the emergency department with family sent by urgent care by ambulance with report of pneumonia.  One week of dry cough, generalized weakness, no appetite, 101 fever, low blood pressure.  Chest CT-Necrotizing right middle lobe pneumonia.    RECOMMENDATIONS  1-PNA concerning with fever, leukocytosis, necrotizing right middle lobe PNA on imaging in woman just returning from [place]. While TB is in the differential, the neg QFG with strong mitogen response, the appearance, lack of adenopathy and acuity suggesting bacterial    Agree with pulmonary involvement and recommend  -escalation to Zosyn 1/9- changed to ceftriaxone 1 gram IV daily on 1/10 as this will only require 30 minutes to deliver-continue for now   1/11-with +strept ag in urine will dc metronidazole as this may all be due to a severe necrotizing strept PNA    Thank you for consulting us and involving us in the management of this most interesting and challenging case.  We will follow along in the care of this patient. Please call us at [phone] or text me directly on my cell# at [phone] with any concerns.   83-year-old female PMH of DM2, anxiety/depression, chronic joint and back pain, osteoarthritis, GERD, anemia, exsmoker dementia, presents to the emergency department with family sent by urgent care by ambulance with report of pneumonia.  One week of dry cough, generalized weakness, no appetite, 101 fever, low blood pressure.  Chest CT-Necrotizing right middle lobe pneumonia.    RECOMMENDATIONS  1-PNA concerning with fever, leukocytosis, necrotizing right middle lobe PNA on imaging in woman just returning from [place]. While TB is in the differential, the neg QFG with strong mitogen response, the appearance, lack of adenopathy and acuity suggesting bacterial    Agree with pulmonary involvement and recommend  -escalation to Zosyn 1/9- changed to ceftriaxone 1 gram IV daily on 1/10 as this will only require 30 minutes to deliver-continue for now   1/11-with +strept ag in urine will dc metronidazole as this may all be due to a severe necrotizing strept PNA    Thank you for consulting us and involving us in the management of this most interesting and challenging case.  We will follow along in the care of this patient. Please call us at [phone] or text me directly on my cell# at [phone] with any concerns.

## 2024-01-12 ENCOUNTER — TRANSCRIPTION ENCOUNTER (OUTPATIENT)
Age: 84
End: 2024-01-12

## 2024-01-12 LAB
ALBUMIN SERPL ELPH-MCNC: 2 G/DL — LOW (ref 3.3–5)
ALBUMIN SERPL ELPH-MCNC: 2 G/DL — LOW (ref 3.3–5)
ALP SERPL-CCNC: 89 U/L — SIGNIFICANT CHANGE UP (ref 40–120)
ALP SERPL-CCNC: 89 U/L — SIGNIFICANT CHANGE UP (ref 40–120)
ALT FLD-CCNC: 14 U/L — SIGNIFICANT CHANGE UP (ref 12–78)
ALT FLD-CCNC: 14 U/L — SIGNIFICANT CHANGE UP (ref 12–78)
ANION GAP SERPL CALC-SCNC: 3 MMOL/L — LOW (ref 5–17)
ANION GAP SERPL CALC-SCNC: 3 MMOL/L — LOW (ref 5–17)
AST SERPL-CCNC: 15 U/L — SIGNIFICANT CHANGE UP (ref 15–37)
AST SERPL-CCNC: 15 U/L — SIGNIFICANT CHANGE UP (ref 15–37)
BILIRUB SERPL-MCNC: 0.3 MG/DL — SIGNIFICANT CHANGE UP (ref 0.2–1.2)
BILIRUB SERPL-MCNC: 0.3 MG/DL — SIGNIFICANT CHANGE UP (ref 0.2–1.2)
BUN SERPL-MCNC: 12 MG/DL — SIGNIFICANT CHANGE UP (ref 7–23)
BUN SERPL-MCNC: 12 MG/DL — SIGNIFICANT CHANGE UP (ref 7–23)
CALCIUM SERPL-MCNC: 8.3 MG/DL — LOW (ref 8.5–10.1)
CALCIUM SERPL-MCNC: 8.3 MG/DL — LOW (ref 8.5–10.1)
CHLORIDE SERPL-SCNC: 110 MMOL/L — HIGH (ref 96–108)
CHLORIDE SERPL-SCNC: 110 MMOL/L — HIGH (ref 96–108)
CO2 SERPL-SCNC: 23 MMOL/L — SIGNIFICANT CHANGE UP (ref 22–31)
CO2 SERPL-SCNC: 23 MMOL/L — SIGNIFICANT CHANGE UP (ref 22–31)
CREAT SERPL-MCNC: 0.73 MG/DL — SIGNIFICANT CHANGE UP (ref 0.5–1.3)
CREAT SERPL-MCNC: 0.73 MG/DL — SIGNIFICANT CHANGE UP (ref 0.5–1.3)
EGFR: 82 ML/MIN/1.73M2 — SIGNIFICANT CHANGE UP
EGFR: 82 ML/MIN/1.73M2 — SIGNIFICANT CHANGE UP
GLUCOSE SERPL-MCNC: 180 MG/DL — HIGH (ref 70–99)
GLUCOSE SERPL-MCNC: 180 MG/DL — HIGH (ref 70–99)
HCT VFR BLD CALC: 23 % — LOW (ref 34.5–45)
HCT VFR BLD CALC: 23 % — LOW (ref 34.5–45)
HGB BLD-MCNC: 7.7 G/DL — LOW (ref 11.5–15.5)
HGB BLD-MCNC: 7.7 G/DL — LOW (ref 11.5–15.5)
MCHC RBC-ENTMCNC: 30 PG — SIGNIFICANT CHANGE UP (ref 27–34)
MCHC RBC-ENTMCNC: 30 PG — SIGNIFICANT CHANGE UP (ref 27–34)
MCHC RBC-ENTMCNC: 33.5 GM/DL — SIGNIFICANT CHANGE UP (ref 32–36)
MCHC RBC-ENTMCNC: 33.5 GM/DL — SIGNIFICANT CHANGE UP (ref 32–36)
MCV RBC AUTO: 89.5 FL — SIGNIFICANT CHANGE UP (ref 80–100)
MCV RBC AUTO: 89.5 FL — SIGNIFICANT CHANGE UP (ref 80–100)
NRBC # BLD: 0 /100 WBCS — SIGNIFICANT CHANGE UP (ref 0–0)
NRBC # BLD: 0 /100 WBCS — SIGNIFICANT CHANGE UP (ref 0–0)
PLATELET # BLD AUTO: 391 K/UL — SIGNIFICANT CHANGE UP (ref 150–400)
PLATELET # BLD AUTO: 391 K/UL — SIGNIFICANT CHANGE UP (ref 150–400)
POTASSIUM SERPL-MCNC: 3.8 MMOL/L — SIGNIFICANT CHANGE UP (ref 3.5–5.3)
POTASSIUM SERPL-MCNC: 3.8 MMOL/L — SIGNIFICANT CHANGE UP (ref 3.5–5.3)
POTASSIUM SERPL-SCNC: 3.8 MMOL/L — SIGNIFICANT CHANGE UP (ref 3.5–5.3)
POTASSIUM SERPL-SCNC: 3.8 MMOL/L — SIGNIFICANT CHANGE UP (ref 3.5–5.3)
PROT SERPL-MCNC: 6.2 G/DL — SIGNIFICANT CHANGE UP (ref 6–8.3)
PROT SERPL-MCNC: 6.2 G/DL — SIGNIFICANT CHANGE UP (ref 6–8.3)
RBC # BLD: 2.57 M/UL — LOW (ref 3.8–5.2)
RBC # BLD: 2.57 M/UL — LOW (ref 3.8–5.2)
RBC # FLD: 14.7 % — HIGH (ref 10.3–14.5)
RBC # FLD: 14.7 % — HIGH (ref 10.3–14.5)
SODIUM SERPL-SCNC: 136 MMOL/L — SIGNIFICANT CHANGE UP (ref 135–145)
SODIUM SERPL-SCNC: 136 MMOL/L — SIGNIFICANT CHANGE UP (ref 135–145)
WBC # BLD: 12.13 K/UL — HIGH (ref 3.8–10.5)
WBC # BLD: 12.13 K/UL — HIGH (ref 3.8–10.5)
WBC # FLD AUTO: 12.13 K/UL — HIGH (ref 3.8–10.5)
WBC # FLD AUTO: 12.13 K/UL — HIGH (ref 3.8–10.5)

## 2024-01-12 RX ORDER — METFORMIN HYDROCHLORIDE 850 MG/1
1 TABLET ORAL
Qty: 60 | Refills: 0
Start: 2024-01-12 | End: 2024-02-10

## 2024-01-12 RX ADMIN — Medication 3 MILLILITER(S): at 14:28

## 2024-01-12 RX ADMIN — QUETIAPINE FUMARATE 25 MILLIGRAM(S): 200 TABLET, FILM COATED ORAL at 21:42

## 2024-01-12 RX ADMIN — CEFTRIAXONE 100 MILLIGRAM(S): 500 INJECTION, POWDER, FOR SOLUTION INTRAMUSCULAR; INTRAVENOUS at 12:25

## 2024-01-12 RX ADMIN — INSULIN GLARGINE 12 UNIT(S): 100 INJECTION, SOLUTION SUBCUTANEOUS at 08:29

## 2024-01-12 RX ADMIN — HEPARIN SODIUM 5000 UNIT(S): 5000 INJECTION INTRAVENOUS; SUBCUTANEOUS at 14:11

## 2024-01-12 RX ADMIN — HEPARIN SODIUM 5000 UNIT(S): 5000 INJECTION INTRAVENOUS; SUBCUTANEOUS at 21:42

## 2024-01-12 RX ADMIN — MIRTAZAPINE 15 MILLIGRAM(S): 45 TABLET, ORALLY DISINTEGRATING ORAL at 21:43

## 2024-01-12 RX ADMIN — SODIUM CHLORIDE 75 MILLILITER(S): 9 INJECTION INTRAMUSCULAR; INTRAVENOUS; SUBCUTANEOUS at 14:11

## 2024-01-12 RX ADMIN — Medication 1: at 08:28

## 2024-01-12 RX ADMIN — HEPARIN SODIUM 5000 UNIT(S): 5000 INJECTION INTRAVENOUS; SUBCUTANEOUS at 06:07

## 2024-01-12 RX ADMIN — Medication 3 MILLILITER(S): at 07:52

## 2024-01-12 NOTE — CONSULT NOTE ADULT - CONSULT REQUESTED DATE/TIME
09-Jan-2024 08:43
09-Jan-2024 15:07
09-Jan-2024 08:02
09-Jan-2024 09:37
12-Jan-2024 08:21
09-Jan-2024 16:07

## 2024-01-12 NOTE — DISCHARGE NOTE PROVIDER - NSDCCPCAREPLAN_GEN_ALL_CORE_FT
PRINCIPAL DISCHARGE DIAGNOSIS  Diagnosis: Pneumonia  Assessment and Plan of Treatment: You were admitted wtih shortness of breath. CT Scan of your chest showed bilateral pneumonia. You were seen by the infectious disease doctor which recommended you start on intravenous antibiotics. Your symptoms have improved.      SECONDARY DISCHARGE DIAGNOSES  Diagnosis: Type 2 diabetes mellitus  Assessment and Plan of Treatment: You were admitted with elevated blood sugars. You were seen by the endocrinology doctor who recommends you start on Metformin 1000 mg two times a day. Please follow with .    Diagnosis: Anemia  Assessment and Plan of Treatment: Your hemoglobin has remained stable. Please follow with your primary medical doctor.     PRINCIPAL DISCHARGE DIAGNOSIS  Diagnosis: Pneumonia  Assessment and Plan of Treatment: You were admitted wtih shortness of breath. CT Scan of your chest showed bilateral pneumonia. You were seen by the infectious disease doctor which recommended you start on intravenous antibiotics. Your symptoms have improved.      SECONDARY DISCHARGE DIAGNOSES  Diagnosis: Anemia  Assessment and Plan of Treatment: Your hemoglobin has remained stable. Please follow with your primary medical doctor.    Diagnosis: Type 2 diabetes mellitus  Assessment and Plan of Treatment: You were admitted with elevated blood sugars. You were seen by the endocrinology doctor who recommends you start on Metformin 500 mg two times a day. Please follow with Dr. Perlman

## 2024-01-12 NOTE — CONSULT NOTE ADULT - SUBJECTIVE AND OBJECTIVE BOX
Patient is a 83y old  Female who presents with a chief complaint of fever (12 Jan 2024 04:58)      Reason For Consult:     HPI:  83-year-old female PMH of DM2, anxiety/depression, chronic joint and back pain, osteoarthritis, GERD, anemia, exsmoker dementia, presents to the emergency department with son sent by urgent care by ambulance with report of pneumonia.  Patient states for the past week patient has had dry cough, generalized weakness, no appetite, at urgent care patient flu/COVID was negative, concern for patient having 101 fever, low blood pressure, and right sided lung infiltrate.  Pt just came back from [place] and as per son the doctors there had stopped all her meds except Seroquel and mirtazapine , she is not on any meds for diabetes  (09 Jan 2024 07:42)      PAST MEDICAL & SURGICAL HISTORY:  Type 2 diabetes mellitus      Anxiety      OA (osteoarthritis) of knee  (Right knee)      Anemia      Other chronic pancreatitis      Anxiety and depression      Seasonal allergies      Diabetes mellitus, type 2      GERD (gastroesophageal reflux disease)      Chronic joint pain      S/P arthroscopic knee surgery  (Right knee, 2007)      History of back surgery  (2007)      S/P cholecystectomy  (2002)      S/P subtotal gastrectomy  (with gastrojejunal anastomosis, 30 years ago)      S/P knee replacement          FAMILY HISTORY:  No pertinent family history in first degree relatives          Social History:    MEDICATIONS  (STANDING):  albuterol/ipratropium for Nebulization 3 milliLiter(s) Nebulizer every 6 hours  cefTRIAXone   IVPB 1000 milliGRAM(s) IV Intermittent every 24 hours  dextrose 5%. 1000 milliLiter(s) (100 mL/Hr) IV Continuous <Continuous>  dextrose 5%. 1000 milliLiter(s) (50 mL/Hr) IV Continuous <Continuous>  dextrose 50% Injectable 12.5 Gram(s) IV Push once  dextrose 50% Injectable 25 Gram(s) IV Push once  dextrose 50% Injectable 25 Gram(s) IV Push once  glucagon  Injectable 1 milliGRAM(s) IntraMuscular once  heparin   Injectable 5000 Unit(s) SubCutaneous every 8 hours  influenza  Vaccine (HIGH DOSE) 0.7 milliLiter(s) IntraMuscular once  insulin glargine Injectable (LANTUS) 12 Unit(s) SubCutaneous every morning  insulin lispro (ADMELOG) corrective regimen sliding scale   SubCutaneous three times a day before meals  insulin lispro (ADMELOG) corrective regimen sliding scale   SubCutaneous at bedtime  insulin lispro Injectable (ADMELOG) 4 Unit(s) SubCutaneous three times a day before meals  mirtazapine Soltab 15 milliGRAM(s) Oral at bedtime  QUEtiapine 25 milliGRAM(s) Oral at bedtime  sodium chloride 0.9%. 1000 milliLiter(s) (75 mL/Hr) IV Continuous <Continuous>    MEDICATIONS  (PRN):  acetaminophen     Tablet .. 650 milliGRAM(s) Oral every 6 hours PRN Temp greater or equal to 38C (100.4F), Mild Pain (1 - 3)  dextrose Oral Gel 15 Gram(s) Oral once PRN Blood Glucose LESS THAN 70 milliGRAM(s)/deciliter  guaiFENesin Oral Liquid (Sugar-Free) 200 milliGRAM(s) Oral every 6 hours PRN Cough        T(C): 37.3 (01-12-24 @ 05:20), Max: 37.3 (01-12-24 @ 05:20)  HR: 101 (01-12-24 @ 05:20) (98 - 114)  BP: 131/71 (01-12-24 @ 05:20) (131/71 - 146/73)  RR: 18 (01-12-24 @ 05:20) (18 - 18)  SpO2: 93% (01-12-24 @ 05:20) (92% - 98%)  Wt(kg): --    PHYSICAL EXAM:  CHEST/LUNG: Clear to percussion bilaterally; No rales, rhonchi, wheezing, or rubs  HEART: Regular rate and rhythm; No murmurs, rubs, or gallops  ABDOMEN: Soft, Nontender, Nondistended; Bowel sounds present  EXTREMITIES:  2+ Peripheral Pulses, No clubbing, cyanosis, or edema  SKIN: No rashes or lesions    CAPILLARY BLOOD GLUCOSE      POCT Blood Glucose.: 173 mg/dL (12 Jan 2024 07:44)  POCT Blood Glucose.: 248 mg/dL (12 Jan 2024 00:10)  POCT Blood Glucose.: 331 mg/dL (11 Jan 2024 21:49)  POCT Blood Glucose.: 151 mg/dL (11 Jan 2024 19:34)  POCT Blood Glucose.: 75 mg/dL (11 Jan 2024 16:56)  POCT Blood Glucose.: 113 mg/dL (11 Jan 2024 11:56)                            7.7    12.13 )-----------( 391      ( 12 Jan 2024 06:46 )             23.0       CMP:  01-12 @ 06:46  SGPT 14  Albumin 2.0   Alk Phos 89   Anion Gap 3   SGOT 15   Total Bili 0.3   BUN 12   Calcium Total 8.3   CO2 23   Chloride 110   Creatinine 0.73   eGFR if AA --   eGFR if non AA --   Glucose 180   Potassium 3.8   Protein 6.2   Sodium 136      Thyroid Function Tests:      Diabetes Tests:       Radiology:

## 2024-01-12 NOTE — DISCHARGE NOTE PROVIDER - CARE PROVIDERS DIRECT ADDRESSES
[email or web address]

## 2024-01-12 NOTE — DISCHARGE NOTE PROVIDER - NSDCCAREPROVSEEN_GEN_ALL_CORE_FT
Chet, Beatrice Garcia, Osman oCnti, Davindar Perlman, Gunnar Antonio, Faisal Weil, Loreta Cantrell, Rakel Chet, Beatrice Garcia, Osman Conti, Davindar Perlman, Gunnar Antonio, Faisal Weil, Loreta Cantrell, Rakel

## 2024-01-12 NOTE — PROGRESS NOTE ADULT - SUBJECTIVE AND OBJECTIVE BOX
Patient is a 83y old  Female who presents with a chief complaint of fever (12 Jan 2024 14:02)    Date of servie : 01-12-24 @ 14:18  INTERVAL HPI/OVERNIGHT EVENTS:  T(C): 37.3 (01-12-24 @ 13:45), Max: 37.3 (01-12-24 @ 05:20)  HR: 102 (01-12-24 @ 13:45) (98 - 114)  BP: 149/76 (01-12-24 @ 13:45) (131/71 - 149/76)  RR: 17 (01-12-24 @ 13:45) (17 - 18)  SpO2: 93% (01-12-24 @ 13:45) (92% - 98%)  Wt(kg): --  I&O's Summary    11 Jan 2024 07:01  -  12 Jan 2024 07:00  --------------------------------------------------------  IN: 360 mL / OUT: 0 mL / NET: 360 mL        LABS:                        7.7    12.13 )-----------( 391      ( 12 Jan 2024 06:46 )             23.0     01-12    136  |  110<H>  |  12  ----------------------------<  180<H>  3.8   |  23  |  0.73    Ca    8.3<L>      12 Jan 2024 06:46    TPro  6.2  /  Alb  2.0<L>  /  TBili  0.3  /  DBili  x   /  AST  15  /  ALT  14  /  AlkPhos  89  01-12      Urinalysis Basic - ( 12 Jan 2024 06:46 )    Color: x / Appearance: x / SG: x / pH: x  Gluc: 180 mg/dL / Ketone: x  / Bili: x / Urobili: x   Blood: x / Protein: x / Nitrite: x   Leuk Esterase: x / RBC: x / WBC x   Sq Epi: x / Non Sq Epi: x / Bacteria: x      CAPILLARY BLOOD GLUCOSE      POCT Blood Glucose.: 132 mg/dL (12 Jan 2024 11:58)  POCT Blood Glucose.: 173 mg/dL (12 Jan 2024 07:44)  POCT Blood Glucose.: 248 mg/dL (12 Jan 2024 00:10)  POCT Blood Glucose.: 331 mg/dL (11 Jan 2024 21:49)  POCT Blood Glucose.: 151 mg/dL (11 Jan 2024 19:34)  POCT Blood Glucose.: 75 mg/dL (11 Jan 2024 16:56)        Urinalysis Basic - ( 12 Jan 2024 06:46 )    Color: x / Appearance: x / SG: x / pH: x  Gluc: 180 mg/dL / Ketone: x  / Bili: x / Urobili: x   Blood: x / Protein: x / Nitrite: x   Leuk Esterase: x / RBC: x / WBC x   Sq Epi: x / Non Sq Epi: x / Bacteria: x        MEDICATIONS  (STANDING):  albuterol/ipratropium for Nebulization 3 milliLiter(s) Nebulizer every 6 hours  cefTRIAXone   IVPB 1000 milliGRAM(s) IV Intermittent every 24 hours  dextrose 5%. 1000 milliLiter(s) (100 mL/Hr) IV Continuous <Continuous>  dextrose 5%. 1000 milliLiter(s) (50 mL/Hr) IV Continuous <Continuous>  dextrose 50% Injectable 12.5 Gram(s) IV Push once  dextrose 50% Injectable 25 Gram(s) IV Push once  dextrose 50% Injectable 25 Gram(s) IV Push once  glucagon  Injectable 1 milliGRAM(s) IntraMuscular once  heparin   Injectable 5000 Unit(s) SubCutaneous every 8 hours  influenza  Vaccine (HIGH DOSE) 0.7 milliLiter(s) IntraMuscular once  insulin glargine Injectable (LANTUS) 12 Unit(s) SubCutaneous every morning  insulin lispro (ADMELOG) corrective regimen sliding scale   SubCutaneous three times a day before meals  insulin lispro (ADMELOG) corrective regimen sliding scale   SubCutaneous at bedtime  insulin lispro Injectable (ADMELOG) 4 Unit(s) SubCutaneous three times a day before meals  mirtazapine Soltab 15 milliGRAM(s) Oral at bedtime  QUEtiapine 25 milliGRAM(s) Oral at bedtime  sodium chloride 0.9%. 1000 milliLiter(s) (75 mL/Hr) IV Continuous <Continuous>    MEDICATIONS  (PRN):  acetaminophen     Tablet .. 650 milliGRAM(s) Oral every 6 hours PRN Temp greater or equal to 38C (100.4F), Mild Pain (1 - 3)  dextrose Oral Gel 15 Gram(s) Oral once PRN Blood Glucose LESS THAN 70 milliGRAM(s)/deciliter  guaiFENesin Oral Liquid (Sugar-Free) 200 milliGRAM(s) Oral every 6 hours PRN Cough          PHYSICAL EXAM:  GENERAL: NAD, well-groomed, well-developed  HEAD:  Atraumatic, Normocephalic  CHEST/LUNG: Clear to percussion bilaterally; No rales, rhonchi, wheezing, or rubs  HEART: Regular rate and rhythm; No murmurs, rubs, or gallops  ABDOMEN: Soft, Nontender, Nondistended; Bowel sounds present  EXTREMITIES:  2+ Peripheral Pulses, No clubbing, cyanosis, or edema  LYMPH: No lymphadenopathy noted  SKIN: No rashes or lesions    Care Discussed with Consultants/Other Providers [ ] YES  [ ] NO

## 2024-01-12 NOTE — PROGRESS NOTE ADULT - SUBJECTIVE AND OBJECTIVE BOX
[facility] DIVISION of INFECTIOUS DISEASE  Osman Garcia MD PhD, Michelle Braga MD, Tammi Zhao MD, Kenton Coy MD, Jann Acosta MD  and providing coverage with Ana Ortiz MD  Providing Infectious Disease Consultations at [facility], [facility], [facility]'s    Office# [phone] to schedule follow up appointments  Answering Service for urgent calls or New Consults [phone]  Cell# to text for urgent issues Osman Garcia [phone]     infectious diseases progress note:    FRANKI VALVERDE is a 83y y. o. Female patient    Overnight and events of the last 24hrs reviewed    Allergies    No Known Allergies    Intolerances        ANTIBIOTICS/RELEVANT:  antimicrobials  cefTRIAXone   IVPB 1000 milliGRAM(s) IV Intermittent every 24 hours    immunologic:  influenza  Vaccine (HIGH DOSE) 0.7 milliLiter(s) IntraMuscular once    OTHER:  acetaminophen     Tablet .. 650 milliGRAM(s) Oral every 6 hours PRN  albuterol/ipratropium for Nebulization 3 milliLiter(s) Nebulizer every 6 hours  dextrose 5%. 1000 milliLiter(s) IV Continuous <Continuous>  dextrose 5%. 1000 milliLiter(s) IV Continuous <Continuous>  dextrose 50% Injectable 12.5 Gram(s) IV Push once  dextrose 50% Injectable 25 Gram(s) IV Push once  dextrose 50% Injectable 25 Gram(s) IV Push once  dextrose Oral Gel 15 Gram(s) Oral once PRN  glucagon  Injectable 1 milliGRAM(s) IntraMuscular once  guaiFENesin Oral Liquid (Sugar-Free) 200 milliGRAM(s) Oral every 6 hours PRN  heparin   Injectable 5000 Unit(s) SubCutaneous every 8 hours  insulin glargine Injectable (LANTUS) 12 Unit(s) SubCutaneous every morning  insulin lispro (ADMELOG) corrective regimen sliding scale   SubCutaneous three times a day before meals  insulin lispro (ADMELOG) corrective regimen sliding scale   SubCutaneous at bedtime  insulin lispro Injectable (ADMELOG) 4 Unit(s) SubCutaneous three times a day before meals  mirtazapine Soltab 15 milliGRAM(s) Oral at bedtime  QUEtiapine 25 milliGRAM(s) Oral at bedtime  sodium chloride 0.9%. 1000 milliLiter(s) IV Continuous <Continuous>      Objective:  Vital Signs Last 24 Hrs  T(C): 37.3 (12 Jan 2024 05:20), Max: 37.3 (12 Jan 2024 05:20)  T(F): 99.1 (12 Jan 2024 05:20), Max: 99.1 (12 Jan 2024 05:20)  HR: 100 (12 Jan 2024 08:23) (98 - 114)  BP: 131/71 (12 Jan 2024 05:20) (131/71 - 146/73)  BP(mean): --  RR: 18 (12 Jan 2024 05:20) (18 - 18)  SpO2: 95% (12 Jan 2024 08:23) (92% - 98%)    Parameters below as of 12 Jan 2024 08:23  Patient On (Oxygen Delivery Method): room air        T(C): 37.3 (01-12-24 @ 05:20), Max: 37.6 (01-10-24 @ 21:30)  T(C): 37.3 (01-12-24 @ 05:20), Max: 37.6 (01-10-24 @ 21:30)  T(C): 37.3 (01-12-24 @ 05:20), Max: 37.6 (01-10-24 @ 21:30)    PHYSICAL EXAM:  HEENT: NC atraumatic  Neck: supple  Respiratory: no accessory muscle use, breathing comfortably, few crackles, improved exam  Cardiovascular: distant  Gastrointestinal: normal appearing, nondistended  Extremities: no clubbing, no cyanosis,  Neuro: alert, tracking      LABS:                          7.7    12.13 )-----------( 391      ( 12 Jan 2024 06:46 )             23.0       WBC  12.13 01-12 @ 06:46  13.94 01-11 @ 09:45  16.34 01-09 @ 11:15  16.10 01-08 @ 22:05      01-12    136  |  110<H>  |  12  ----------------------------<  180<H>  3.8   |  23  |  0.73    Ca    8.3<L>      12 Jan 2024 06:46    TPro  6.2  /  Alb  2.0<L>  /  TBili  0.3  /  DBili  x   /  AST  15  /  ALT  14  /  AlkPhos  89  01-12      Creatinine: 0.73 mg/dL (01-12-24 @ 06:46)  Creatinine: 0.87 mg/dL (01-11-24 @ 09:45)  Creatinine: 1.00 mg/dL (01-09-24 @ 11:15)  Creatinine: 1.60 mg/dL (01-08-24 @ 22:05)        Urinalysis Basic - ( 12 Jan 2024 06:46 )    Color: x / Appearance: x / SG: x / pH: x  Gluc: 180 mg/dL / Ketone: x  / Bili: x / Urobili: x   Blood: x / Protein: x / Nitrite: x   Leuk Esterase: x / RBC: x / WBC x   Sq Epi: x / Non Sq Epi: x / Bacteria: x            INFLAMMATORY MARKERS      MICROBIOLOGY:              RADIOLOGY & ADDITIONAL STUDIES:   [facility] DIV[facility] of INFECTIOUS DISEASE  Osman Garcia MD PhD, Michelle Braga MD, Tammi Zhao MD, Kenton Coy MD, Jann Acosta MD  and providing coverage with Ana Ortiz MD  Providing Infectious Disease Consultations at [facility], [facility], [facility]'s    Office# [phone] to schedule follow up appointments  Answering Service for urgent calls or New Consults [phone]  Cell# to text for urgent issues Osman Garcia [phone]     infectious diseases progress note:    FRANKI VALVERDE is a 83y y. o. Female patient    Overnight and events of the last 24hrs reviewed    Allergies    No Known Allergies    Intolerances        ANTIBIOTICS/RELEVANT:  antimicrobials  cefTRIAXone   IVPB 1000 milliGRAM(s) IV Intermittent every 24 hours    immunologic:  influenza  Vaccine (HIGH DOSE) 0.7 milliLiter(s) IntraMuscular once    OTHER:  acetaminophen     Tablet .. 650 milliGRAM(s) Oral every 6 hours PRN  albuterol/ipratropium for Nebulization 3 milliLiter(s) Nebulizer every 6 hours  dextrose 5%. 1000 milliLiter(s) IV Continuous <Continuous>  dextrose 5%. 1000 milliLiter(s) IV Continuous <Continuous>  dextrose 50% Injectable 12.5 Gram(s) IV Push once  dextrose 50% Injectable 25 Gram(s) IV Push once  dextrose 50% Injectable 25 Gram(s) IV Push once  dextrose Oral Gel 15 Gram(s) Oral once PRN  glucagon  Injectable 1 milliGRAM(s) IntraMuscular once  guaiFENesin Oral Liquid (Sugar-Free) 200 milliGRAM(s) Oral every 6 hours PRN  heparin   Injectable 5000 Unit(s) SubCutaneous every 8 hours  insulin glargine Injectable (LANTUS) 12 Unit(s) SubCutaneous every morning  insulin lispro (ADMELOG) corrective regimen sliding scale   SubCutaneous three times a day before meals  insulin lispro (ADMELOG) corrective regimen sliding scale   SubCutaneous at bedtime  insulin lispro Injectable (ADMELOG) 4 Unit(s) SubCutaneous three times a day before meals  mirtazapine Soltab 15 milliGRAM(s) Oral at bedtime  QUEtiapine 25 milliGRAM(s) Oral at bedtime  sodium chloride 0.9%. 1000 milliLiter(s) IV Continuous <Continuous>      Objective:  Vital Signs Last 24 Hrs  T(C): 37.3 (12 Jan 2024 05:20), Max: 37.3 (12 Jan 2024 05:20)  T(F): 99.1 (12 Jan 2024 05:20), Max: 99.1 (12 Jan 2024 05:20)  HR: 100 (12 Jan 2024 08:23) (98 - 114)  BP: 131/71 (12 Jan 2024 05:20) (131/71 - 146/73)  BP(mean): --  RR: 18 (12 Jan 2024 05:20) (18 - 18)  SpO2: 95% (12 Jan 2024 08:23) (92% - 98%)    Parameters below as of 12 Jan 2024 08:23  Patient On (Oxygen Delivery Method): room air        T(C): 37.3 (01-12-24 @ 05:20), Max: 37.6 (01-10-24 @ 21:30)  T(C): 37.3 (01-12-24 @ 05:20), Max: 37.6 (01-10-24 @ 21:30)  T(C): 37.3 (01-12-24 @ 05:20), Max: 37.6 (01-10-24 @ 21:30)    PHYSICAL EXAM:  HEENT: NC atraumatic  Neck: supple  Respiratory: no accessory muscle use, breathing comfortably, few crackles, improved exam  Cardiovascular: distant  Gastrointestinal: normal appearing, nondistended  Extremities: no clubbing, no cyanosis,  Neuro: alert, tracking      LABS:                          7.7    12.13 )-----------( 391      ( 12 Jan 2024 06:46 )             23.0       WBC  12.13 01-12 @ 06:46  13.94 01-11 @ 09:45  16.34 01-09 @ 11:15  16.10 01-08 @ 22:05      01-12    136  |  110<H>  |  12  ----------------------------<  180<H>  3.8   |  23  |  0.73    Ca    8.3<L>      12 Jan 2024 06:46    TPro  6.2  /  Alb  2.0<L>  /  TBili  0.3  /  DBili  x   /  AST  15  /  ALT  14  /  AlkPhos  89  01-12      Creatinine: 0.73 mg/dL (01-12-24 @ 06:46)  Creatinine: 0.87 mg/dL (01-11-24 @ 09:45)  Creatinine: 1.00 mg/dL (01-09-24 @ 11:15)  Creatinine: 1.60 mg/dL (01-08-24 @ 22:05)        Urinalysis Basic - ( 12 Jan 2024 06:46 )    Color: x / Appearance: x / SG: x / pH: x  Gluc: 180 mg/dL / Ketone: x  / Bili: x / Urobili: x   Blood: x / Protein: x / Nitrite: x   Leuk Esterase: x / RBC: x / WBC x   Sq Epi: x / Non Sq Epi: x / Bacteria: x            INFLAMMATORY MARKERS      MICROBIOLOGY:              RADIOLOGY & ADDITIONAL STUDIES:

## 2024-01-12 NOTE — DISCHARGE NOTE PROVIDER - CARE PROVIDER_API CALL
Braulio Guerrero  Endocrinology/Metab/Diabetes  [address] [place] [id number]  Phone: [phone]  Fax: [phone]  Follow Up Time: 1 week   Braulio Guerrero  Endocrinology/Metab/Diabetes  [address] [place] [id number]  Phone: [phone]  Fax: [phone]  Follow Up Time: 1 week   Braulio Guerrero  Endocrinology/Metab/Diabetes  [address] [place] [address]  Phone: [phone]  Fax: [phone]  Follow Up Time: 1 week

## 2024-01-12 NOTE — PROGRESS NOTE ADULT - SUBJECTIVE AND OBJECTIVE BOX
Date/Time Patient Seen:  		  Referring MD:   Data Reviewed	       Patient is a 83y old  Female who presents with a chief complaint of fever (11 Jan 2024 13:47)      Subjective/HPI     PAST MEDICAL & SURGICAL HISTORY:  DM (diabetes mellitus)    Type 2 diabetes mellitus    Anxiety    OA (osteoarthritis) of knee  (Right knee)    Anemia    Other chronic pancreatitis    DM (diabetes mellitus)    HLD (hyperlipidemia)    Anxiety and depression    Seasonal allergies    Diabetes mellitus, type 2    GERD (gastroesophageal reflux disease)    Chronic joint pain    S/P arthroscopic knee surgery  (Right knee, 2007)    History of back surgery  (2007)    S/P cholecystectomy  (2002)    S/P subtotal gastrectomy  (with gastrojejunal anastomosis, 30 years ago)    S/P knee replacement          Medication list         MEDICATIONS  (STANDING):  albuterol/ipratropium for Nebulization 3 milliLiter(s) Nebulizer every 6 hours  cefTRIAXone   IVPB 1000 milliGRAM(s) IV Intermittent every 24 hours  dextrose 5%. 1000 milliLiter(s) (100 mL/Hr) IV Continuous <Continuous>  dextrose 5%. 1000 milliLiter(s) (50 mL/Hr) IV Continuous <Continuous>  dextrose 50% Injectable 12.5 Gram(s) IV Push once  dextrose 50% Injectable 25 Gram(s) IV Push once  dextrose 50% Injectable 25 Gram(s) IV Push once  glucagon  Injectable 1 milliGRAM(s) IntraMuscular once  heparin   Injectable 5000 Unit(s) SubCutaneous every 8 hours  influenza  Vaccine (HIGH DOSE) 0.7 milliLiter(s) IntraMuscular once  insulin glargine Injectable (LANTUS) 12 Unit(s) SubCutaneous every morning  insulin lispro (ADMELOG) corrective regimen sliding scale   SubCutaneous three times a day before meals  insulin lispro (ADMELOG) corrective regimen sliding scale   SubCutaneous at bedtime  insulin lispro Injectable (ADMELOG) 4 Unit(s) SubCutaneous three times a day before meals  mirtazapine Soltab 15 milliGRAM(s) Oral at bedtime  QUEtiapine 25 milliGRAM(s) Oral at bedtime  sodium chloride 0.9%. 1000 milliLiter(s) (75 mL/Hr) IV Continuous <Continuous>    MEDICATIONS  (PRN):  acetaminophen     Tablet .. 650 milliGRAM(s) Oral every 6 hours PRN Temp greater or equal to 38C (100.4F), Mild Pain (1 - 3)  dextrose Oral Gel 15 Gram(s) Oral once PRN Blood Glucose LESS THAN 70 milliGRAM(s)/deciliter  guaiFENesin Oral Liquid (Sugar-Free) 200 milliGRAM(s) Oral every 6 hours PRN Cough         Vitals log        ICU Vital Signs Last 24 Hrs  T(C): 37.2 (11 Jan 2024 20:50), Max: 37.6 (11 Jan 2024 05:02)  T(F): 99 (11 Jan 2024 20:50), Max: 99.7 (11 Jan 2024 05:02)  HR: 103 (11 Jan 2024 20:50) (98 - 114)  BP: 146/73 (11 Jan 2024 20:50) (134/71 - 146/73)  BP(mean): --  ABP: --  ABP(mean): --  RR: 18 (11 Jan 2024 20:50) (18 - 18)  SpO2: 92% (11 Jan 2024 20:50) (92% - 98%)    O2 Parameters below as of 11 Jan 2024 20:50  Patient On (Oxygen Delivery Method): room air                 Input and Output:  I&O's Detail    10 Jarvis 2024 07:01  -  11 Jan 2024 07:00  --------------------------------------------------------  IN:    IV PiggyBack: 50 mL    sodium chloride 0.9%: 150 mL  Total IN: 200 mL    OUT:  Total OUT: 0 mL    Total NET: 200 mL      11 Jan 2024 07:01  -  12 Jan 2024 04:58  --------------------------------------------------------  IN:    Oral Fluid: 360 mL  Total IN: 360 mL    OUT:  Total OUT: 0 mL    Total NET: 360 mL          Lab Data                        8.7    13.94 )-----------( 397      ( 11 Jan 2024 09:45 )             26.1     01-11    138  |  107  |  15  ----------------------------<  135<H>  3.8   |  25  |  0.87    Ca    8.3<L>      11 Jan 2024 09:45    TPro  6.8  /  Alb  2.2<L>  /  TBili  0.4  /  DBili  x   /  AST  15  /  ALT  17  /  AlkPhos  107  01-11            Review of Systems	      Objective     Physical Examination    heart s1s2  lung dc BS  head nc      Pertinent Lab findings & Imaging      Shanta:  NO   Adequate UO     I&O's Detail    10 Jarvis 2024 07:01  -  11 Jan 2024 07:00  --------------------------------------------------------  IN:    IV PiggyBack: 50 mL    sodium chloride 0.9%: 150 mL  Total IN: 200 mL    OUT:  Total OUT: 0 mL    Total NET: 200 mL      11 Jan 2024 07:01  -  12 Jan 2024 04:58  --------------------------------------------------------  IN:    Oral Fluid: 360 mL  Total IN: 360 mL    OUT:  Total OUT: 0 mL    Total NET: 360 mL               Discussed with:     Cultures:	        Radiology

## 2024-01-12 NOTE — DISCHARGE NOTE PROVIDER - PROVIDER TOKENS
PROVIDER:[TOKEN:[[id number]],FOLLOWUP:[1 week]] PROVIDER:[TOKEN:[[id number]],FOLLOWUP:[1 week]] PROVIDER:[TOKEN:[[id number]],FOLLOWUP:[1 week]]

## 2024-01-12 NOTE — PROGRESS NOTE ADULT - ASSESSMENT
83-year-old female PMH of DM2, anxiety/depression, chronic joint and back pain, osteoarthritis, GERD, anemia, exsmoker dementia, presents to the emergency department with family sent by urgent care by ambulance with report of pneumonia.  One week of dry cough, generalized weakness, no appetite, 101 fever, low blood pressure.  Chest CT-Necrotizing right middle lobe pneumonia.    RECOMMENDATIONS  1-PNA concerning with fever, leukocytosis, necrotizing right middle lobe PNA on imaging in woman just returning from [place]. While TB is in the differential, the neg QFG with strong mitogen response, the appearance, lack of adenopathy and acuity suggesting bacterial    Agree with pulmonary involvement and recommend  -escalation to Zosyn 1/9- changed to ceftriaxone 1 gram IV daily on 1/10 as this will only require 30 minutes to deliver-continue for now   1/11-with +strept ag in urine stopped metronidazole as this may all be due to a severe necrotizing strept PNA  1/12 clinically improving but still with leukocytosis and with necrotizing PNA may extend duration past the 5 days so for now recommend continue IV ceftriaxone and then will assess 1/15-Monday for ultimate duration and potential to de-escalate to PO    Thank you for consulting us and involving us in the management of this most interesting and challenging case.  We will follow along in the care of this patient. Please call us at [phone] or text me directly on my cell# at [phone] with any concerns.    Starting tomorrow Dr Tammi Zhao will be assuming care of this patient so please contact her with any questions, concerns or new micro data.     83-year-old female PMH of DM2, anxiety/depression, chronic joint and back pain, osteoarthritis, GERD, anemia, exsmoker dementia, presents to the emergency department with family sent by urgent care by ambulance with report of pneumonia.  One week of dry cough, generalized weakness, no appetite, 101 fever, low blood pressure.  Chest CT-Necrotizing right middle lobe pneumonia.    RECOMMENDATIONS  1-PNA concerning with fever, leukocytosis, necrotizing right middle lobe PNA on imaging in woman just returning from [place]. While TB is in the differential, the neg QFG with strong mitogen response, the appearance, lack of adenopathy and acuity suggesting bacterial    Agree with pulmonary involvement and recommend  -escalation to Zosyn 1/9- changed to ceftriaxone 1 gram IV daily on 1/10 as this will only require 30 minutes to deliver-continue for now   1/11-with +strept ag in urine stopped metronidazole as this may all be due to a severe necrotizing strept PNA  1/12 clinically improving but still with leukocytosis and with necrotizing PNA may extend duration past the 5 days so for now recommend continue IV ceftriaxone and then will assess 1/15-Monday for ultimate duration and potential to de-escalate to PO    Thank you for consulting us and involving us in the management of this most interesting and challenging case.  We will follow along in the care of this patient. Please call us at [phone] or text me directly on my cell# at [phone] with any concerns.    Starting tomorrow Dr Tammi Zhao will be assuming care of this patient so please contact her with any questions, concerns or new micro data.

## 2024-01-12 NOTE — DISCHARGE NOTE PROVIDER - NSDCMRMEDTOKEN_GEN_ALL_CORE_FT
mirtazapine 15 mg oral tablet, disintegratin tab(s) orally once a day (at bedtime)  Seroquel 25 mg oral tablet: 1 tab(s) orally once a day (at bedtime)   metFORMIN 1000 mg oral tablet: 1 tab(s) orally 2 times a day  mirtazapine 15 mg oral tablet, disintegratin tab(s) orally once a day (at bedtime)  Seroquel 25 mg oral tablet: 1 tab(s) orally once a day (at bedtime)

## 2024-01-12 NOTE — PROGRESS NOTE ADULT - ASSESSMENT
83-year-old female PMH of DM2, anxiety/depression, chronic joint and back pain, osteoarthritis, GERD, anemia, ex-smoker dementia, presents to the emergency department with son sent by urgent care by ambulance with report of pneumonia.    pna  necrotizing pna?  anxiety  depression  OP  OA  DM  GERD  Anemia  Dementia  Ex smoker    ABX  ID eval noted  w/u in progress  Strep Pna Ag positive    ID eval  CT chest reviewed  PNA eval - necrotizing pna reported on CT  biomarkers  cx -   sputum -   monitor VS and HD and Sat  legionella - strep Ag -   discussion about TB risk  cough rx regimen  bronchodilators for cough assist and mucociliary clearance  goal sat > 88 pct  Pall Eval - GOC discussion

## 2024-01-12 NOTE — PROGRESS NOTE ADULT - SUBJECTIVE AND OBJECTIVE BOX
[facility]  Mitchell Cantrell PA-C  [address] [place] [address]  [phone]      INTERVAL HPI/OVERNIGHT EVENTS:  Pt s/e  No new GI events    MEDICATIONS  (STANDING):  albuterol/ipratropium for Nebulization 3 milliLiter(s) Nebulizer every 6 hours  cefTRIAXone   IVPB 1000 milliGRAM(s) IV Intermittent every 24 hours  dextrose 5%. 1000 milliLiter(s) (100 mL/Hr) IV Continuous <Continuous>  dextrose 5%. 1000 milliLiter(s) (50 mL/Hr) IV Continuous <Continuous>  dextrose 50% Injectable 12.5 Gram(s) IV Push once  dextrose 50% Injectable 25 Gram(s) IV Push once  dextrose 50% Injectable 25 Gram(s) IV Push once  glucagon  Injectable 1 milliGRAM(s) IntraMuscular once  heparin   Injectable 5000 Unit(s) SubCutaneous every 8 hours  influenza  Vaccine (HIGH DOSE) 0.7 milliLiter(s) IntraMuscular once  insulin glargine Injectable (LANTUS) 12 Unit(s) SubCutaneous every morning  insulin lispro (ADMELOG) corrective regimen sliding scale   SubCutaneous three times a day before meals  insulin lispro (ADMELOG) corrective regimen sliding scale   SubCutaneous at bedtime  insulin lispro Injectable (ADMELOG) 4 Unit(s) SubCutaneous three times a day before meals  mirtazapine Soltab 15 milliGRAM(s) Oral at bedtime  QUEtiapine 25 milliGRAM(s) Oral at bedtime  sodium chloride 0.9%. 1000 milliLiter(s) (75 mL/Hr) IV Continuous <Continuous>    MEDICATIONS  (PRN):  acetaminophen     Tablet .. 650 milliGRAM(s) Oral every 6 hours PRN Temp greater or equal to 38C (100.4F), Mild Pain (1 - 3)  dextrose Oral Gel 15 Gram(s) Oral once PRN Blood Glucose LESS THAN 70 milliGRAM(s)/deciliter  guaiFENesin Oral Liquid (Sugar-Free) 200 milliGRAM(s) Oral every 6 hours PRN Cough      Allergies    No Known Allergies      PHYSICAL EXAM:   Vital Signs:  Vital Signs Last 24 Hrs  T(C): 37.6 (2024 05:02), Max: 37.6 (10 Jarvis 2024 21:30)  T(F): 99.7 (2024 05:02), Max: 99.7 (2024 05:02)  HR: 103 (2024 05:02) (99 - 115)  BP: 134/71 (2024 05:02) (124/67 - 156/75)  BP(mean): --  RR: 18 (2024 05:02) (17 - 18)  SpO2: 95% (2024 05:02) (92% - 99%)    Parameters below as of 2024 05:02  Patient On (Oxygen Delivery Method): room air      Daily     Daily Weight in k.5 (2024 05:02)    GENERAL:  Appears stated age  HEENT:  NC/AT  CHEST:  Full & symmetric excursion  HEART:  Regular rhythm  ABDOMEN:  Soft, non-tender, non-distended  EXTEREMITIES:  no cyanosis  SKIN:  No rash  NEURO:  Alert      LABS:                        8.3    16.34 )-----------( 224      ( 2024 11:15 )             24.7     01-09    137  |  106  |  28<H>  ----------------------------<  203<H>  4.5   |  25  |  1.00    Ca    8.3<L>      2024 11:15    TPro  6.9  /  Alb  2.3<L>  /  TBili  0.5  /  DBili  x   /  AST  28  /  ALT  16  /  AlkPhos  114        Urinalysis Basic - ( 2024 22:30 )    Color: Yellow / Appearance: Clear / S.042 / pH: x  Gluc: x / Ketone: Negative mg/dL  / Bili: Negative / Urobili: 0.2 mg/dL   Blood: x / Protein: 100 mg/dL / Nitrite: Negative   Leuk Esterase: Small / RBC: 4 /HPF / WBC 22 /HPF   Sq Epi: x / Non Sq Epi: x / Bacteria: Occasional /HPF     [facility]  Mitchell Cantrell PA-C  [address] [place] [address]  [phone]      INTERVAL HPI/OVERNIGHT EVENTS:  Pt s/e  No new GI events    MEDICATIONS  (STANDING):  albuterol/ipratropium for Nebulization 3 milliLiter(s) Nebulizer every 6 hours  cefTRIAXone   IVPB 1000 milliGRAM(s) IV Intermittent every 24 hours  dextrose 5%. 1000 milliLiter(s) (100 mL/Hr) IV Continuous <Continuous>  dextrose 5%. 1000 milliLiter(s) (50 mL/Hr) IV Continuous <Continuous>  dextrose 50% Injectable 12.5 Gram(s) IV Push once  dextrose 50% Injectable 25 Gram(s) IV Push once  dextrose 50% Injectable 25 Gram(s) IV Push once  glucagon  Injectable 1 milliGRAM(s) IntraMuscular once  heparin   Injectable 5000 Unit(s) SubCutaneous every 8 hours  influenza  Vaccine (HIGH DOSE) 0.7 milliLiter(s) IntraMuscular once  insulin glargine Injectable (LANTUS) 12 Unit(s) SubCutaneous every morning  insulin lispro (ADMELOG) corrective regimen sliding scale   SubCutaneous three times a day before meals  insulin lispro (ADMELOG) corrective regimen sliding scale   SubCutaneous at bedtime  insulin lispro Injectable (ADMELOG) 4 Unit(s) SubCutaneous three times a day before meals  mirtazapine Soltab 15 milliGRAM(s) Oral at bedtime  QUEtiapine 25 milliGRAM(s) Oral at bedtime  sodium chloride 0.9%. 1000 milliLiter(s) (75 mL/Hr) IV Continuous <Continuous>    MEDICATIONS  (PRN):  acetaminophen     Tablet .. 650 milliGRAM(s) Oral every 6 hours PRN Temp greater or equal to 38C (100.4F), Mild Pain (1 - 3)  dextrose Oral Gel 15 Gram(s) Oral once PRN Blood Glucose LESS THAN 70 milliGRAM(s)/deciliter  guaiFENesin Oral Liquid (Sugar-Free) 200 milliGRAM(s) Oral every 6 hours PRN Cough      Allergies    No Known Allergies      PHYSICAL EXAM:   Vital Signs:  Vital Signs Last 24 Hrs  T(C): 37.6 (2024 05:02), Max: 37.6 (10 Jarvis 2024 21:30)  T(F): 99.7 (2024 05:02), Max: 99.7 (2024 05:02)  HR: 103 (2024 05:02) (99 - 115)  BP: 134/71 (2024 05:02) (124/67 - 156/75)  BP(mean): --  RR: 18 (2024 05:02) (17 - 18)  SpO2: 95% (2024 05:02) (92% - 99%)    Parameters below as of 2024 05:02  Patient On (Oxygen Delivery Method): room air      Daily     Daily Weight in k.5 (2024 05:02)    GENERAL:  Appears stated age  HEENT:  NC/AT  CHEST:  Full & symmetric excursion  HEART:  Regular rhythm  ABDOMEN:  Soft, non-tender, non-distended  EXTEREMITIES:  no cyanosis  SKIN:  No rash  NEURO:  Alert      LABS:                        8.3    16.34 )-----------( 224      ( 2024 11:15 )             24.7     01-09    137  |  106  |  28<H>  ----------------------------<  203<H>  4.5   |  25  |  1.00    Ca    8.3<L>      2024 11:15    TPro  6.9  /  Alb  2.3<L>  /  TBili  0.5  /  DBili  x   /  AST  28  /  ALT  16  /  AlkPhos  114        Urinalysis Basic - ( 2024 22:30 )    Color: Yellow / Appearance: Clear / S.042 / pH: x  Gluc: x / Ketone: Negative mg/dL  / Bili: Negative / Urobili: 0.2 mg/dL   Blood: x / Protein: 100 mg/dL / Nitrite: Negative   Leuk Esterase: Small / RBC: 4 /HPF / WBC 22 /HPF   Sq Epi: x / Non Sq Epi: x / Bacteria: Occasional /HPF

## 2024-01-12 NOTE — CONSULT NOTE ADULT - PROBLEM SELECTOR RECOMMENDATION 9
cont lantus 12 units qam  cont admelog 4 units 3x/day before meals  cont low dose admelog corrective scale coverage qac/qhs  cont cons cho diet  goal bg 100-180 in hosp setting

## 2024-01-12 NOTE — PROGRESS NOTE ADULT - SUBJECTIVE AND OBJECTIVE BOX
[facility]    CHIEF COMPLAINT: Patient is a 83y old  Female who presents with a chief complaint of fever (12 Jan 2024 08:34)      Follow Up: [ ] Chest Pain      [ ] Dyspnea     [ ] Palpitations    [ ] Atrial Fibrillation     [ ] Ventricular Dysrhythmia    [ ] Abnormal EKG                      [ ] Abnormal Cardiac Enzymes     [ ] Valvular Disease    HPI:  83-year-old female PMH of DM2, anxiety/depression, chronic joint and back pain, osteoarthritis, GERD, anemia, exsmoker dementia, presents to the emergency department with son sent by urgent care by ambulance with report of pneumonia.  Patient states for the past week patient has had dry cough, generalized weakness, no appetite, at urgent care patient flu/COVID was negative, concern for patient having 101 fever, low blood pressure, and right sided lung infiltrate.  Pt just came back from [place] and as per son the doctors there had stopped all her meds except Seroquel and mirtazapine , she is not on any meds for diabetes  (09 Jan 2024 07:42)    PAST MEDICAL & SURGICAL HISTORY:  Type 2 diabetes mellitus      Anxiety      OA (osteoarthritis) of knee  (Right knee)      Anemia      Other chronic pancreatitis      Anxiety and depression      Seasonal allergies      Diabetes mellitus, type 2      GERD (gastroesophageal reflux disease)      Chronic joint pain      S/P arthroscopic knee surgery  (Right knee, 2007)      History of back surgery  (2007)      S/P cholecystectomy  (2002)      S/P subtotal gastrectomy  (with gastrojejunal anastomosis, 30 years ago)      S/P knee replacement        MEDICATIONS  (STANDING):  albuterol/ipratropium for Nebulization 3 milliLiter(s) Nebulizer every 6 hours  cefTRIAXone   IVPB 1000 milliGRAM(s) IV Intermittent every 24 hours  dextrose 5%. 1000 milliLiter(s) (100 mL/Hr) IV Continuous <Continuous>  dextrose 5%. 1000 milliLiter(s) (50 mL/Hr) IV Continuous <Continuous>  dextrose 50% Injectable 25 Gram(s) IV Push once  dextrose 50% Injectable 12.5 Gram(s) IV Push once  dextrose 50% Injectable 25 Gram(s) IV Push once  glucagon  Injectable 1 milliGRAM(s) IntraMuscular once  heparin   Injectable 5000 Unit(s) SubCutaneous every 8 hours  influenza  Vaccine (HIGH DOSE) 0.7 milliLiter(s) IntraMuscular once  insulin glargine Injectable (LANTUS) 12 Unit(s) SubCutaneous every morning  insulin lispro (ADMELOG) corrective regimen sliding scale   SubCutaneous three times a day before meals  insulin lispro (ADMELOG) corrective regimen sliding scale   SubCutaneous at bedtime  insulin lispro Injectable (ADMELOG) 4 Unit(s) SubCutaneous three times a day before meals  mirtazapine Soltab 15 milliGRAM(s) Oral at bedtime  QUEtiapine 25 milliGRAM(s) Oral at bedtime  sodium chloride 0.9%. 1000 milliLiter(s) (75 mL/Hr) IV Continuous <Continuous>    MEDICATIONS  (PRN):  acetaminophen     Tablet .. 650 milliGRAM(s) Oral every 6 hours PRN Temp greater or equal to 38C (100.4F), Mild Pain (1 - 3)  dextrose Oral Gel 15 Gram(s) Oral once PRN Blood Glucose LESS THAN 70 milliGRAM(s)/deciliter  guaiFENesin Oral Liquid (Sugar-Free) 200 milliGRAM(s) Oral every 6 hours PRN Cough    Allergies    No Known Allergies    Intolerances        REVIEW OF SYSTEMS:    CONSTITUTIONAL: No weakness, fevers or chills.   EYES/ENT: No visual changes;    NECK: No pain or stiffness  RESPIRATORY: No cough, wheezing, No shortness of breath  CARDIOVASCULAR: No chest pain or palpitations  GASTROINTESTINAL: No abdominal pain, or hematochezia.  GENITOURINARY: No dysuria orhematuria  NEUROLOGICAL: No numbness or weakness  SKIN: No itching, burning, rashes  All other review of systems is negative unless indicated above    Vital Signs Last 24 Hrs  T(C): 37.3 (12 Jan 2024 05:20), Max: 37.3 (12 Jan 2024 05:20)  T(F): 99.1 (12 Jan 2024 05:20), Max: 99.1 (12 Jan 2024 05:20)  HR: 100 (12 Jan 2024 08:23) (98 - 114)  BP: 131/71 (12 Jan 2024 05:20) (131/71 - 146/73)  BP(mean): --  RR: 18 (12 Jan 2024 05:20) (18 - 18)  SpO2: 95% (12 Jan 2024 08:23) (92% - 98%)    Parameters below as of 12 Jan 2024 08:23  Patient On (Oxygen Delivery Method): room air      I&O's Summary    11 Jan 2024 07:01  -  12 Jan 2024 07:00  --------------------------------------------------------  IN: 360 mL / OUT: 0 mL / NET: 360 mL      PHYSICAL EXAM:  Constitutional: NAD  Neurological:calm, no focal deficits  HEENT: no JVD, EOMI  Cardiovascular: Regular, S1 and S2, no murmur  Pulmonary: breath sounds bilaterally  Gastrointestinal: Bowel Sounds present, soft, nontender  EXT:  no peripheral edema  Skin: No rashes.  Psych:  Mood calm  LABS: All Labs Reviewed:                          7.7    12.13 )-----------( 391      ( 12 Jan 2024 06:46 )             23.0     01-12    136  |  110<H>  |  12  ----------------------------<  180<H>  3.8   |  23  |  0.73    Ca    8.3<L>      12 Jan 2024 06:46    TPro  6.2  /  Alb  2.0<L>  /  TBili  0.3  /  DBili  x   /  AST  15  /  ALT  14  /  AlkPhos  89  01-12    Assessment and Plan:   · Assessment    83F with DM2, GERD, dementia who presents with SOB, fever, cough and generalized weakness found with PNA on CT. ECG is normal.  Doubt ACS    Suggest:    1. PNA  - Treat with Abx    2. Multiple CAD risk factors  - Recommend Lipitor 20mg daily    3. Pt less agitated   4. Anemia, 6.8 --> 8.3 .  No plan for endoscopy at this time.    Will follow prn.                [facility]    CHIEF COMPLAINT: Patient is a 83y old  Female who presents with a chief complaint of fever (12 Jan 2024 08:34)      Follow Up: [ ] Chest Pain      [ ] Dyspnea     [ ] Palpitations    [ ] Atrial Fibrillation     [ ] Ventricular Dysrhythmia    [ ] Abnormal EKG                      [ ] Abnormal Cardiac Enzymes     [ ] Valvular Disease    HPI:  83-year-old female PMH of DM2, anxiety/depression, chronic joint and back pain, osteoarthritis, GERD, anemia, exsmoker dementia, presents to the emergency department with son sent by urgent care by ambulance with report of pneumonia.  Patient states for the past week patient has had dry cough, generalized weakness, no appetite, at urgent care patient flu/COVID was negative, concern for patient having 101 fever, low blood pressure, and right sided lung infiltrate.  Pt just came back from [place] and as per son the doctors there had stopped all her meds except Seroquel and mirtazapine , she is not on any meds for diabetes  (09 Jan 2024 07:42)    PAST MEDICAL & SURGICAL HISTORY:  Type 2 diabetes mellitus      Anxiety      OA (osteoarthritis) of knee  (Right knee)      Anemia      Other chronic pancreatitis      Anxiety and depression      Seasonal allergies      Diabetes mellitus, type 2      GERD (gastroesophageal reflux disease)      Chronic joint pain      S/P arthroscopic knee surgery  (Right knee, 2007)      History of back surgery  (2007)      S/P cholecystectomy  (2002)      S/P subtotal gastrectomy  (with gastrojejunal anastomosis, 30 years ago)      S/P knee replacement        MEDICATIONS  (STANDING):  albuterol/ipratropium for Nebulization 3 milliLiter(s) Nebulizer every 6 hours  cefTRIAXone   IVPB 1000 milliGRAM(s) IV Intermittent every 24 hours  dextrose 5%. 1000 milliLiter(s) (100 mL/Hr) IV Continuous <Continuous>  dextrose 5%. 1000 milliLiter(s) (50 mL/Hr) IV Continuous <Continuous>  dextrose 50% Injectable 25 Gram(s) IV Push once  dextrose 50% Injectable 12.5 Gram(s) IV Push once  dextrose 50% Injectable 25 Gram(s) IV Push once  glucagon  Injectable 1 milliGRAM(s) IntraMuscular once  heparin   Injectable 5000 Unit(s) SubCutaneous every 8 hours  influenza  Vaccine (HIGH DOSE) 0.7 milliLiter(s) IntraMuscular once  insulin glargine Injectable (LANTUS) 12 Unit(s) SubCutaneous every morning  insulin lispro (ADMELOG) corrective regimen sliding scale   SubCutaneous three times a day before meals  insulin lispro (ADMELOG) corrective regimen sliding scale   SubCutaneous at bedtime  insulin lispro Injectable (ADMELOG) 4 Unit(s) SubCutaneous three times a day before meals  mirtazapine Soltab 15 milliGRAM(s) Oral at bedtime  QUEtiapine 25 milliGRAM(s) Oral at bedtime  sodium chloride 0.9%. 1000 milliLiter(s) (75 mL/Hr) IV Continuous <Continuous>    MEDICATIONS  (PRN):  acetaminophen     Tablet .. 650 milliGRAM(s) Oral every 6 hours PRN Temp greater or equal to 38C (100.4F), Mild Pain (1 - 3)  dextrose Oral Gel 15 Gram(s) Oral once PRN Blood Glucose LESS THAN 70 milliGRAM(s)/deciliter  guaiFENesin Oral Liquid (Sugar-Free) 200 milliGRAM(s) Oral every 6 hours PRN Cough    Allergies    No Known Allergies    Intolerances        REVIEW OF SYSTEMS:    CONSTITUTIONAL: No weakness, fevers or chills.   EYES/ENT: No visual changes;    NECK: No pain or stiffness  RESPIRATORY: No cough, wheezing, No shortness of breath  CARDIOVASCULAR: No chest pain or palpitations  GASTROINTESTINAL: No abdominal pain, or hematochezia.  GENITOURINARY: No dysuria orhematuria  NEUROLOGICAL: No numbness or weakness  SKIN: No itching, burning, rashes  All other review of systems is negative unless indicated above    Vital Signs Last 24 Hrs  T(C): 37.3 (12 Jan 2024 05:20), Max: 37.3 (12 Jan 2024 05:20)  T(F): 99.1 (12 Jan 2024 05:20), Max: 99.1 (12 Jan 2024 05:20)  HR: 100 (12 Jan 2024 08:23) (98 - 114)  BP: 131/71 (12 Jan 2024 05:20) (131/71 - 146/73)  BP(mean): --  RR: 18 (12 Jan 2024 05:20) (18 - 18)  SpO2: 95% (12 Jan 2024 08:23) (92% - 98%)    Parameters below as of 12 Jan 2024 08:23  Patient On (Oxygen Delivery Method): room air      I&O's Summary    11 Jan 2024 07:01  -  12 Jan 2024 07:00  --------------------------------------------------------  IN: 360 mL / OUT: 0 mL / NET: 360 mL      PHYSICAL EXAM:  Constitutional: NAD  Neurological:calm, no focal deficits  HEENT: no JVD, EOMI  Cardiovascular: Regular, S1 and S2, no murmur  Pulmonary: breath sounds bilaterally  Gastrointestinal: Bowel Sounds present, soft, nontender  EXT:  no peripheral edema  Skin: No rashes.  Psych:  Mood calm  LABS: All Labs Reviewed:                          7.7    12.13 )-----------( 391      ( 12 Jan 2024 06:46 )             23.0     01-12    136  |  110<H>  |  12  ----------------------------<  180<H>  3.8   |  23  |  0.73    Ca    8.3<L>      12 Jan 2024 06:46    TPro  6.2  /  Alb  2.0<L>  /  TBili  0.3  /  DBili  x   /  AST  15  /  ALT  14  /  AlkPhos  89  01-12    Assessment and Plan:   · Assessment    83F with DM2, GERD, dementia who presents with SOB, fever, cough and generalized weakness found with PNA on CT. ECG is normal.  Doubt ACS    Suggest:    1. PNA  - Treat with Abx    2. Multiple CAD risk factors  - Recommend Lipitor 20mg daily    3. Pt less agitated   4. Anemia, 6.8 --> 8.3 .  No plan for endoscopy at this time.    Will follow prn.                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 He had missed HD. Patient now s/p HD with improved symptoms. CT chest did show multifocal PNA but clinically not presenting as such. ID called for guidance.

## 2024-01-12 NOTE — CONSULT NOTE ADULT - CONSULT REASON
cough
?PNA
Hypotension
dm2 uncontrolled
anemia
83y A1C with Estimated Average Glucose Result: 11.1 % (01-08-24 @ 22:05)   diabetes mellitus uncontrolled type 2

## 2024-01-12 NOTE — DISCHARGE NOTE PROVIDER - NPI NUMBER (FOR [employer] USE ONLY) :
Patient is checking the status of this request.  She is more than happy to provide further information, if needed.   [[id number]] [[id number]] [[id number]]

## 2024-01-12 NOTE — DISCHARGE NOTE PROVIDER - HOSPITAL COURSE
83-year-old female PMH of DM2, anxiety/depression, chronic joint and back pain, osteoarthritis, GERD, anemia, exsmoker dementia, presents to the emergency department with son sent by urgent care by ambulance with report of pneumonia.  Patient states for the past week patient has had dry cough, generalized weakness, no appetite, at urgent care patient flu/COVID was negative, concern for patient having 101 fever, low blood pressure, and right sided lung infiltrate.  Pt just came back from [place] and as per son the doctors there had stopped all her meds except Seroquel and mirtazapine , she is not on any meds for diabetes     1 Sepsis sec to pneumonia POA  - cw ceftriaxone  - fu cultures  - pulmonary fu   - ID fu     2 Uncontrolled DM  - pt not on any meds now as per son  -  basal, bolus  - monitor FS  - endo fu appreciated     3 Dementia  - cw mirtazapine  - cw Seroquel    4 Lovenox for DVT prophylaxis       83-year-old female PMH of DM2, anxiety/depression, chronic joint and back pain, osteoarthritis, GERD, anemia, exsmoker dementia, presents to the emergency department with son sent by urgent care by ambulance with report of pneumonia.  Patient states for the past week patient has had dry cough, generalized weakness, no appetite, at urgent care patient flu/COVID was negative, concern for patient having 101 fever, low blood pressure, and right sided lung infiltrate.  Pt just came back from [place] and as per son the doctors there had stopped all her meds except Seroquel and mirtazapine , she is not on any meds for diabetes     1 Sepsis sec to pneumonia POA  - finihsed antibiotics   - ID fu     2 Uncontrolled DM  - dc on metformin   - pt with dementia not a good candidate for insulin     3 Dementia  - cw mirtazapine  - cw Seroquel

## 2024-01-13 LAB
BASOPHILS # BLD AUTO: 0 K/UL — SIGNIFICANT CHANGE UP (ref 0–0.2)
BASOPHILS # BLD AUTO: 0 K/UL — SIGNIFICANT CHANGE UP (ref 0–0.2)
BASOPHILS NFR BLD AUTO: 0 % — SIGNIFICANT CHANGE UP (ref 0–2)
BASOPHILS NFR BLD AUTO: 0 % — SIGNIFICANT CHANGE UP (ref 0–2)
EOSINOPHIL # BLD AUTO: 0 K/UL — SIGNIFICANT CHANGE UP (ref 0–0.5)
EOSINOPHIL # BLD AUTO: 0 K/UL — SIGNIFICANT CHANGE UP (ref 0–0.5)
EOSINOPHIL NFR BLD AUTO: 0 % — SIGNIFICANT CHANGE UP (ref 0–6)
EOSINOPHIL NFR BLD AUTO: 0 % — SIGNIFICANT CHANGE UP (ref 0–6)
HCT VFR BLD CALC: 24.5 % — LOW (ref 34.5–45)
HCT VFR BLD CALC: 24.5 % — LOW (ref 34.5–45)
HGB BLD-MCNC: 8.2 G/DL — LOW (ref 11.5–15.5)
HGB BLD-MCNC: 8.2 G/DL — LOW (ref 11.5–15.5)
LYMPHOCYTES # BLD AUTO: 1.52 K/UL — SIGNIFICANT CHANGE UP (ref 1–3.3)
LYMPHOCYTES # BLD AUTO: 1.52 K/UL — SIGNIFICANT CHANGE UP (ref 1–3.3)
LYMPHOCYTES # BLD AUTO: 16 % — SIGNIFICANT CHANGE UP (ref 13–44)
LYMPHOCYTES # BLD AUTO: 16 % — SIGNIFICANT CHANGE UP (ref 13–44)
MCHC RBC-ENTMCNC: 29.8 PG — SIGNIFICANT CHANGE UP (ref 27–34)
MCHC RBC-ENTMCNC: 29.8 PG — SIGNIFICANT CHANGE UP (ref 27–34)
MCHC RBC-ENTMCNC: 33.5 GM/DL — SIGNIFICANT CHANGE UP (ref 32–36)
MCHC RBC-ENTMCNC: 33.5 GM/DL — SIGNIFICANT CHANGE UP (ref 32–36)
MCV RBC AUTO: 89.1 FL — SIGNIFICANT CHANGE UP (ref 80–100)
MCV RBC AUTO: 89.1 FL — SIGNIFICANT CHANGE UP (ref 80–100)
MONOCYTES # BLD AUTO: 1.71 K/UL — HIGH (ref 0–0.9)
MONOCYTES # BLD AUTO: 1.71 K/UL — HIGH (ref 0–0.9)
MONOCYTES NFR BLD AUTO: 18 % — HIGH (ref 2–14)
MONOCYTES NFR BLD AUTO: 18 % — HIGH (ref 2–14)
NEUTROPHILS # BLD AUTO: 6.07 K/UL — SIGNIFICANT CHANGE UP (ref 1.8–7.4)
NEUTROPHILS # BLD AUTO: 6.07 K/UL — SIGNIFICANT CHANGE UP (ref 1.8–7.4)
NEUTROPHILS NFR BLD AUTO: 60 % — SIGNIFICANT CHANGE UP (ref 43–77)
NEUTROPHILS NFR BLD AUTO: 60 % — SIGNIFICANT CHANGE UP (ref 43–77)
NRBC # BLD: SIGNIFICANT CHANGE UP /100 WBCS (ref 0–0)
NRBC # BLD: SIGNIFICANT CHANGE UP /100 WBCS (ref 0–0)
PLATELET # BLD AUTO: 420 K/UL — HIGH (ref 150–400)
PLATELET # BLD AUTO: 420 K/UL — HIGH (ref 150–400)
RBC # BLD: 2.75 M/UL — LOW (ref 3.8–5.2)
RBC # BLD: 2.75 M/UL — LOW (ref 3.8–5.2)
RBC # FLD: 14.6 % — HIGH (ref 10.3–14.5)
RBC # FLD: 14.6 % — HIGH (ref 10.3–14.5)
WBC # BLD: 9.49 K/UL — SIGNIFICANT CHANGE UP (ref 3.8–10.5)
WBC # BLD: 9.49 K/UL — SIGNIFICANT CHANGE UP (ref 3.8–10.5)
WBC # FLD AUTO: 9.49 K/UL — SIGNIFICANT CHANGE UP (ref 3.8–10.5)
WBC # FLD AUTO: 9.49 K/UL — SIGNIFICANT CHANGE UP (ref 3.8–10.5)

## 2024-01-13 RX ORDER — MIRTAZAPINE 45 MG/1
15 TABLET, ORALLY DISINTEGRATING ORAL AT BEDTIME
Refills: 0 | Status: DISCONTINUED | OUTPATIENT
Start: 2024-01-13 | End: 2024-01-15

## 2024-01-13 RX ADMIN — CEFTRIAXONE 100 MILLIGRAM(S): 500 INJECTION, POWDER, FOR SOLUTION INTRAMUSCULAR; INTRAVENOUS at 12:19

## 2024-01-13 RX ADMIN — Medication 1: at 08:23

## 2024-01-13 RX ADMIN — Medication 1: at 17:02

## 2024-01-13 RX ADMIN — Medication 3: at 22:22

## 2024-01-13 RX ADMIN — Medication 3 MILLILITER(S): at 07:48

## 2024-01-13 RX ADMIN — Medication 4 UNIT(S): at 12:19

## 2024-01-13 RX ADMIN — QUETIAPINE FUMARATE 25 MILLIGRAM(S): 200 TABLET, FILM COATED ORAL at 22:23

## 2024-01-13 RX ADMIN — HEPARIN SODIUM 5000 UNIT(S): 5000 INJECTION INTRAVENOUS; SUBCUTANEOUS at 22:23

## 2024-01-13 RX ADMIN — Medication 4 UNIT(S): at 17:02

## 2024-01-13 RX ADMIN — Medication 3 MILLILITER(S): at 13:34

## 2024-01-13 RX ADMIN — Medication 4 UNIT(S): at 08:23

## 2024-01-13 RX ADMIN — MIRTAZAPINE 15 MILLIGRAM(S): 45 TABLET, ORALLY DISINTEGRATING ORAL at 22:38

## 2024-01-13 RX ADMIN — HEPARIN SODIUM 5000 UNIT(S): 5000 INJECTION INTRAVENOUS; SUBCUTANEOUS at 06:11

## 2024-01-13 RX ADMIN — INSULIN GLARGINE 12 UNIT(S): 100 INJECTION, SOLUTION SUBCUTANEOUS at 08:22

## 2024-01-13 RX ADMIN — HEPARIN SODIUM 5000 UNIT(S): 5000 INJECTION INTRAVENOUS; SUBCUTANEOUS at 14:16

## 2024-01-13 NOTE — PROGRESS NOTE ADULT - ASSESSMENT
83-year-old female PMH of DM2, anxiety/depression, chronic joint and back pain, osteoarthritis, GERD, anemia, ex-smoker dementia, presents to the emergency department with son sent by urgent care by ambulance with report of pneumonia.    pna  necrotizing pna?  anxiety  depression  OP  OA  DM  GERD  Anemia  Dementia  Ex smoker    ABX  ID eval noted  Strep Pna Ag positive  ENDO eval noted    ID eval  CT chest reviewed  PNA eval - necrotizing pna reported on CT  biomarkers  cx -   sputum -   monitor VS and HD and Sat  legionella - strep Ag -   discussion about TB risk  cough rx regimen  bronchodilators for cough assist and mucociliary clearance  goal sat > 88 pct  Pall Eval - GOC discussion

## 2024-01-13 NOTE — PROGRESS NOTE ADULT - SUBJECTIVE AND OBJECTIVE BOX
Patient is a 83y old  Female who presents with a chief complaint of fever (13 Jan 2024 10:29)    Date of servie : 01-13-24 @ 15:38  INTERVAL HPI/OVERNIGHT EVENTS:  T(C): 36.8 (01-13-24 @ 12:22), Max: 36.8 (01-13-24 @ 04:20)  HR: 72 (01-13-24 @ 13:44) (72 - 109)  BP: 129/73 (01-13-24 @ 12:22) (127/66 - 131/66)  RR: 18 (01-13-24 @ 12:22) (17 - 18)  SpO2: 97% (01-13-24 @ 12:22) (92% - 97%)  Wt(kg): --  I&O's Summary    12 Jan 2024 07:01  -  13 Jan 2024 07:00  --------------------------------------------------------  IN: 900 mL / OUT: 0 mL / NET: 900 mL        LABS:                        8.2    9.49  )-----------( 420      ( 13 Jan 2024 06:37 )             24.5     01-12    136  |  110<H>  |  12  ----------------------------<  180<H>  3.8   |  23  |  0.73    Ca    8.3<L>      12 Jan 2024 06:46    TPro  6.2  /  Alb  2.0<L>  /  TBili  0.3  /  DBili  x   /  AST  15  /  ALT  14  /  AlkPhos  89  01-12      Urinalysis Basic - ( 12 Jan 2024 06:46 )    Color: x / Appearance: x / SG: x / pH: x  Gluc: 180 mg/dL / Ketone: x  / Bili: x / Urobili: x   Blood: x / Protein: x / Nitrite: x   Leuk Esterase: x / RBC: x / WBC x   Sq Epi: x / Non Sq Epi: x / Bacteria: x      CAPILLARY BLOOD GLUCOSE      POCT Blood Glucose.: 121 mg/dL (13 Jan 2024 11:57)  POCT Blood Glucose.: 154 mg/dL (13 Jan 2024 08:14)  POCT Blood Glucose.: 248 mg/dL (12 Jan 2024 21:20)  POCT Blood Glucose.: 102 mg/dL (12 Jan 2024 16:37)        Urinalysis Basic - ( 12 Jan 2024 06:46 )    Color: x / Appearance: x / SG: x / pH: x  Gluc: 180 mg/dL / Ketone: x  / Bili: x / Urobili: x   Blood: x / Protein: x / Nitrite: x   Leuk Esterase: x / RBC: x / WBC x   Sq Epi: x / Non Sq Epi: x / Bacteria: x        MEDICATIONS  (STANDING):  albuterol/ipratropium for Nebulization 3 milliLiter(s) Nebulizer every 6 hours  cefTRIAXone   IVPB 1000 milliGRAM(s) IV Intermittent every 24 hours  dextrose 5%. 1000 milliLiter(s) (100 mL/Hr) IV Continuous <Continuous>  dextrose 5%. 1000 milliLiter(s) (50 mL/Hr) IV Continuous <Continuous>  dextrose 50% Injectable 12.5 Gram(s) IV Push once  dextrose 50% Injectable 25 Gram(s) IV Push once  dextrose 50% Injectable 25 Gram(s) IV Push once  glucagon  Injectable 1 milliGRAM(s) IntraMuscular once  heparin   Injectable 5000 Unit(s) SubCutaneous every 8 hours  influenza  Vaccine (HIGH DOSE) 0.7 milliLiter(s) IntraMuscular once  insulin glargine Injectable (LANTUS) 12 Unit(s) SubCutaneous every morning  insulin lispro (ADMELOG) corrective regimen sliding scale   SubCutaneous three times a day before meals  insulin lispro (ADMELOG) corrective regimen sliding scale   SubCutaneous at bedtime  insulin lispro Injectable (ADMELOG) 4 Unit(s) SubCutaneous three times a day before meals  mirtazapine Soltab 15 milliGRAM(s) Oral at bedtime  QUEtiapine 25 milliGRAM(s) Oral at bedtime  sodium chloride 0.9%. 1000 milliLiter(s) (75 mL/Hr) IV Continuous <Continuous>    MEDICATIONS  (PRN):  acetaminophen     Tablet .. 650 milliGRAM(s) Oral every 6 hours PRN Temp greater or equal to 38C (100.4F), Mild Pain (1 - 3)  dextrose Oral Gel 15 Gram(s) Oral once PRN Blood Glucose LESS THAN 70 milliGRAM(s)/deciliter  guaiFENesin Oral Liquid (Sugar-Free) 200 milliGRAM(s) Oral every 6 hours PRN Cough          PHYSICAL EXAM:  GENERAL: NAD, well-groomed, well-developed  HEAD:  Atraumatic, Normocephalic  CHEST/LUNG: Clear to percussion bilaterally; No rales, rhonchi, wheezing, or rubs  HEART: Regular rate and rhythm; No murmurs, rubs, or gallops  ABDOMEN: Soft, Nontender, Nondistended; Bowel sounds present  EXTREMITIES:  2+ Peripheral Pulses, No clubbing, cyanosis, or edema  LYMPH: No lymphadenopathy noted  SKIN: No rashes or lesions    Care Discussed with Consultants/Other Providers [ ] YES  [ ] NO

## 2024-01-13 NOTE — PROGRESS NOTE ADULT - SUBJECTIVE AND OBJECTIVE BOX
[facility] Cardiology    CHIEF COMPLAINT: Patient is a 83y old  Female who presents with a chief complaint of fever (13 Jan 2024 07:50)      Follow Up: [ ] Chest Pain      [ ] Dyspnea     [ ] Palpitations    [ ] Atrial Fibrillation     [ ] Ventricular Dysrhythmia    [ ] Abnormal EKG                      [ ] Abnormal Cardiac Enzymes     [ ] Valvular Disease    HPI:  83-year-old female PMH of DM2, anxiety/depression, chronic joint and back pain, osteoarthritis, GERD, anemia, exsmoker dementia, presents to the emergency department with son sent by urgent care by ambulance with report of pneumonia.  Patient states for the past week patient has had dry cough, generalized weakness, no appetite, at urgent care patient flu/COVID was negative, concern for patient having 101 fever, low blood pressure, and right sided lung infiltrate.  Pt just came back from [place] and as per son the doctors there had stopped all her meds except Seroquel and mirtazapine , she is not on any meds for diabetes  (09 Jan 2024 07:42)    PAST MEDICAL & SURGICAL HISTORY:  Type 2 diabetes mellitus      Anxiety      OA (osteoarthritis) of knee  (Right knee)      Anemia      Other chronic pancreatitis      Anxiety and depression      Seasonal allergies      Diabetes mellitus, type 2      GERD (gastroesophageal reflux disease)      Chronic joint pain      S/P arthroscopic knee surgery  (Right knee, 2007)      History of back surgery  (2007)      S/P cholecystectomy  (2002)      S/P subtotal gastrectomy  (with gastrojejunal anastomosis, 30 years ago)      S/P knee replacement        MEDICATIONS  (STANDING):  albuterol/ipratropium for Nebulization 3 milliLiter(s) Nebulizer every 6 hours  cefTRIAXone   IVPB 1000 milliGRAM(s) IV Intermittent every 24 hours  dextrose 5%. 1000 milliLiter(s) (100 mL/Hr) IV Continuous <Continuous>  dextrose 5%. 1000 milliLiter(s) (50 mL/Hr) IV Continuous <Continuous>  dextrose 50% Injectable 12.5 Gram(s) IV Push once  dextrose 50% Injectable 25 Gram(s) IV Push once  dextrose 50% Injectable 25 Gram(s) IV Push once  glucagon  Injectable 1 milliGRAM(s) IntraMuscular once  heparin   Injectable 5000 Unit(s) SubCutaneous every 8 hours  influenza  Vaccine (HIGH DOSE) 0.7 milliLiter(s) IntraMuscular once  insulin glargine Injectable (LANTUS) 12 Unit(s) SubCutaneous every morning  insulin lispro (ADMELOG) corrective regimen sliding scale   SubCutaneous at bedtime  insulin lispro (ADMELOG) corrective regimen sliding scale   SubCutaneous three times a day before meals  insulin lispro Injectable (ADMELOG) 4 Unit(s) SubCutaneous three times a day before meals  mirtazapine Soltab 15 milliGRAM(s) Oral at bedtime  QUEtiapine 25 milliGRAM(s) Oral at bedtime  sodium chloride 0.9%. 1000 milliLiter(s) (75 mL/Hr) IV Continuous <Continuous>    MEDICATIONS  (PRN):  acetaminophen     Tablet .. 650 milliGRAM(s) Oral every 6 hours PRN Temp greater or equal to 38C (100.4F), Mild Pain (1 - 3)  dextrose Oral Gel 15 Gram(s) Oral once PRN Blood Glucose LESS THAN 70 milliGRAM(s)/deciliter  guaiFENesin Oral Liquid (Sugar-Free) 200 milliGRAM(s) Oral every 6 hours PRN Cough    Allergies    No Known Allergies    Intolerances        REVIEW OF SYSTEMS:    CONSTITUTIONAL: No weakness, fevers or chills.   EYES/ENT: No visual changes;    NECK: No pain or stiffness  RESPIRATORY: No cough, wheezing, No shortness of breath  CARDIOVASCULAR: No chest pain or palpitations  GASTROINTESTINAL: No abdominal pain, or hematochezia.  GENITOURINARY: No dysuria orhematuria  NEUROLOGICAL: No numbness or weakness  SKIN: No itching, burning, rashes  All other review of systems is negative unless indicated above    Vital Signs Last 24 Hrs  T(C): 36.8 (13 Jan 2024 04:20), Max: 37.3 (12 Jan 2024 13:45)  T(F): 98.3 (13 Jan 2024 04:20), Max: 99.2 (12 Jan 2024 13:45)  HR: 78 (13 Jan 2024 08:00) (78 - 109)  BP: 131/66 (13 Jan 2024 04:20) (127/66 - 149/76)  BP(mean): --  RR: 18 (13 Jan 2024 04:20) (17 - 18)  SpO2: 93% (13 Jan 2024 04:20) (92% - 93%)    Parameters below as of 13 Jan 2024 04:20  Patient On (Oxygen Delivery Method): room air      I&O's Summary    12 Jan 2024 07:01  -  13 Jan 2024 07:00  --------------------------------------------------------  IN: 900 mL / OUT: 0 mL / NET: 900 mL        PHYSICAL EXAM:  Constitutional: NAD  Neurological: Alert  HEENT: no JVD, EOMI  Cardiovascular: Regular, S1 and S2, no murmur  Pulmonary: breath sounds bilaterally  Gastrointestinal: Bowel Sounds present, soft, nontender  EXT:  no peripheral edema  Skin: No rashes.  Psych:  Mood calm  LABS: All Labs Reviewed:                          8.2    9.49  )-----------( 420      ( 13 Jan 2024 06:37 )             24.5     01-12    136  |  110<H>  |  12  ----------------------------<  180<H>  3.8   |  23  |  0.73    Ca    8.3<L>      12 Jan 2024 06:46    TPro  6.2  /  Alb  2.0<L>  /  TBili  0.3  /  DBili  x   /  AST  15  /  ALT  14  /  AlkPhos  89  01-12      Assessment and Plan:   · Assessment    83F with DM2, GERD, dementia who presents with SOB, fever, cough and generalized weakness found with PNA on CT. ECG is normal.  Doubt ACS    Suggest:    1. PNA  - Treat with Abx    2. Multiple CAD risk factors  - Recommend Lipitor 20mg daily    3. Pt less agitated   4. Anemia, 6.8 --> 8.3 .  No plan for endoscopy at this time.    Will follow prn.                      [facility] Cardiology    CHIEF COMPLAINT: Patient is a 83y old  Female who presents with a chief complaint of fever (13 Jan 2024 07:50)      Follow Up: [ ] Chest Pain      [ ] Dyspnea     [ ] Palpitations    [ ] Atrial Fibrillation     [ ] Ventricular Dysrhythmia    [ ] Abnormal EKG                      [ ] Abnormal Cardiac Enzymes     [ ] Valvular Disease    HPI:  83-year-old female PMH of DM2, anxiety/depression, chronic joint and back pain, osteoarthritis, GERD, anemia, exsmoker dementia, presents to the emergency department with son sent by urgent care by ambulance with report of pneumonia.  Patient states for the past week patient has had dry cough, generalized weakness, no appetite, at urgent care patient flu/COVID was negative, concern for patient having 101 fever, low blood pressure, and right sided lung infiltrate.  Pt just came back from [place] and as per son the doctors there had stopped all her meds except Seroquel and mirtazapine , she is not on any meds for diabetes  (09 Jan 2024 07:42)    PAST MEDICAL & SURGICAL HISTORY:  Type 2 diabetes mellitus      Anxiety      OA (osteoarthritis) of knee  (Right knee)      Anemia      Other chronic pancreatitis      Anxiety and depression      Seasonal allergies      Diabetes mellitus, type 2      GERD (gastroesophageal reflux disease)      Chronic joint pain      S/P arthroscopic knee surgery  (Right knee, 2007)      History of back surgery  (2007)      S/P cholecystectomy  (2002)      S/P subtotal gastrectomy  (with gastrojejunal anastomosis, 30 years ago)      S/P knee replacement        MEDICATIONS  (STANDING):  albuterol/ipratropium for Nebulization 3 milliLiter(s) Nebulizer every 6 hours  cefTRIAXone   IVPB 1000 milliGRAM(s) IV Intermittent every 24 hours  dextrose 5%. 1000 milliLiter(s) (100 mL/Hr) IV Continuous <Continuous>  dextrose 5%. 1000 milliLiter(s) (50 mL/Hr) IV Continuous <Continuous>  dextrose 50% Injectable 12.5 Gram(s) IV Push once  dextrose 50% Injectable 25 Gram(s) IV Push once  dextrose 50% Injectable 25 Gram(s) IV Push once  glucagon  Injectable 1 milliGRAM(s) IntraMuscular once  heparin   Injectable 5000 Unit(s) SubCutaneous every 8 hours  influenza  Vaccine (HIGH DOSE) 0.7 milliLiter(s) IntraMuscular once  insulin glargine Injectable (LANTUS) 12 Unit(s) SubCutaneous every morning  insulin lispro (ADMELOG) corrective regimen sliding scale   SubCutaneous at bedtime  insulin lispro (ADMELOG) corrective regimen sliding scale   SubCutaneous three times a day before meals  insulin lispro Injectable (ADMELOG) 4 Unit(s) SubCutaneous three times a day before meals  mirtazapine Soltab 15 milliGRAM(s) Oral at bedtime  QUEtiapine 25 milliGRAM(s) Oral at bedtime  sodium chloride 0.9%. 1000 milliLiter(s) (75 mL/Hr) IV Continuous <Continuous>    MEDICATIONS  (PRN):  acetaminophen     Tablet .. 650 milliGRAM(s) Oral every 6 hours PRN Temp greater or equal to 38C (100.4F), Mild Pain (1 - 3)  dextrose Oral Gel 15 Gram(s) Oral once PRN Blood Glucose LESS THAN 70 milliGRAM(s)/deciliter  guaiFENesin Oral Liquid (Sugar-Free) 200 milliGRAM(s) Oral every 6 hours PRN Cough    Allergies    No Known Allergies    Intolerances        REVIEW OF SYSTEMS:    CONSTITUTIONAL: No weakness, fevers or chills.   EYES/ENT: No visual changes;    NECK: No pain or stiffness  RESPIRATORY: No cough, wheezing, No shortness of breath  CARDIOVASCULAR: No chest pain or palpitations  GASTROINTESTINAL: No abdominal pain, or hematochezia.  GENITOURINARY: No dysuria orhematuria  NEUROLOGICAL: No numbness or weakness  SKIN: No itching, burning, rashes  All other review of systems is negative unless indicated above    Vital Signs Last 24 Hrs  T(C): 36.8 (13 Jan 2024 04:20), Max: 37.3 (12 Jan 2024 13:45)  T(F): 98.3 (13 Jan 2024 04:20), Max: 99.2 (12 Jan 2024 13:45)  HR: 78 (13 Jan 2024 08:00) (78 - 109)  BP: 131/66 (13 Jan 2024 04:20) (127/66 - 149/76)  BP(mean): --  RR: 18 (13 Jan 2024 04:20) (17 - 18)  SpO2: 93% (13 Jan 2024 04:20) (92% - 93%)    Parameters below as of 13 Jan 2024 04:20  Patient On (Oxygen Delivery Method): room air      I&O's Summary    12 Jan 2024 07:01  -  13 Jan 2024 07:00  --------------------------------------------------------  IN: 900 mL / OUT: 0 mL / NET: 900 mL        PHYSICAL EXAM:  Constitutional: NAD  Neurological: Alert  HEENT: no JVD, EOMI  Cardiovascular: Regular, S1 and S2, no murmur  Pulmonary: breath sounds bilaterally  Gastrointestinal: Bowel Sounds present, soft, nontender  EXT:  no peripheral edema  Skin: No rashes.  Psych:  Mood calm  LABS: All Labs Reviewed:                          8.2    9.49  )-----------( 420      ( 13 Jan 2024 06:37 )             24.5     01-12    136  |  110<H>  |  12  ----------------------------<  180<H>  3.8   |  23  |  0.73    Ca    8.3<L>      12 Jan 2024 06:46    TPro  6.2  /  Alb  2.0<L>  /  TBili  0.3  /  DBili  x   /  AST  15  /  ALT  14  /  AlkPhos  89  01-12      Assessment and Plan:   · Assessment    83F with DM2, GERD, dementia who presents with SOB, fever, cough and generalized weakness found with PNA on CT. ECG is normal.  Doubt ACS    Suggest:    1. PNA  - Treat with Abx    2. Multiple CAD risk factors  - Recommend Lipitor 20mg daily    3. Pt less agitated   4. Anemia, 6.8 --> 8.3 .  No plan for endoscopy at this time.    Will follow prn.

## 2024-01-13 NOTE — PROGRESS NOTE ADULT - SUBJECTIVE AND OBJECTIVE BOX
[facility]  Mitchell Cantrell PA-C  [address] [place] [address]  [phone]      INTERVAL HPI/OVERNIGHT EVENTS:  Pt s/e  Tolerating diet   No new GI events      MEDICATIONS  (STANDING):  albuterol/ipratropium for Nebulization 3 milliLiter(s) Nebulizer every 6 hours  cefTRIAXone   IVPB 1000 milliGRAM(s) IV Intermittent every 24 hours  dextrose 5%. 1000 milliLiter(s) (100 mL/Hr) IV Continuous <Continuous>  dextrose 5%. 1000 milliLiter(s) (50 mL/Hr) IV Continuous <Continuous>  dextrose 50% Injectable 12.5 Gram(s) IV Push once  dextrose 50% Injectable 25 Gram(s) IV Push once  dextrose 50% Injectable 25 Gram(s) IV Push once  glucagon  Injectable 1 milliGRAM(s) IntraMuscular once  heparin   Injectable 5000 Unit(s) SubCutaneous every 8 hours  influenza  Vaccine (HIGH DOSE) 0.7 milliLiter(s) IntraMuscular once  insulin glargine Injectable (LANTUS) 12 Unit(s) SubCutaneous every morning  insulin lispro (ADMELOG) corrective regimen sliding scale   SubCutaneous three times a day before meals  insulin lispro (ADMELOG) corrective regimen sliding scale   SubCutaneous at bedtime  insulin lispro Injectable (ADMELOG) 4 Unit(s) SubCutaneous three times a day before meals  mirtazapine Soltab 15 milliGRAM(s) Oral at bedtime  QUEtiapine 25 milliGRAM(s) Oral at bedtime  sodium chloride 0.9%. 1000 milliLiter(s) (75 mL/Hr) IV Continuous <Continuous>    MEDICATIONS  (PRN):  acetaminophen     Tablet .. 650 milliGRAM(s) Oral every 6 hours PRN Temp greater or equal to 38C (100.4F), Mild Pain (1 - 3)  dextrose Oral Gel 15 Gram(s) Oral once PRN Blood Glucose LESS THAN 70 milliGRAM(s)/deciliter  guaiFENesin Oral Liquid (Sugar-Free) 200 milliGRAM(s) Oral every 6 hours PRN Cough      Allergies  No Known Allergies    Intolerances      Vital Signs Last 24 Hrs  T(C): 36.8 (13 Jan 2024 04:20), Max: 37.3 (12 Jan 2024 13:45)  T(F): 98.3 (13 Jan 2024 04:20), Max: 99.2 (12 Jan 2024 13:45)  HR: 78 (13 Jan 2024 08:00) (78 - 109)  BP: 131/66 (13 Jan 2024 04:20) (127/66 - 149/76)  BP(mean): --  RR: 18 (13 Jan 2024 04:20) (17 - 18)  SpO2: 93% (13 Jan 2024 04:20) (92% - 93%)    Parameters below as of 13 Jan 2024 04:20  Patient On (Oxygen Delivery Method): room air    01-12-24 @ 07:01  -  01-13-24 @ 07:00  --------------------------------------------------------  IN: 900 mL / OUT: 0 mL / NET: 900 mL    GENERAL:  Appears stated age  HEENT:  NC/AT  CHEST:  Full & symmetric excursion  HEART:  Regular rhythm  ABDOMEN:  Soft, non-tender, non-distended  EXTEREMITIES:  no cyanosis  SKIN:  No rash  NEURO:  Alert          LABS:                        8.2    9.49  )-----------( 420      ( 13 Jan 2024 06:37 )             24.5     01-12    136  |  110<H>  |  12  ----------------------------<  180<H>  3.8   |  23  |  0.73    Ca    8.3<L>      12 Jan 2024 06:46    TPro  6.2  /  Alb  2.0<L>  /  TBili  0.3  /  DBili  x   /  AST  15  /  ALT  14  /  AlkPhos  89  01-12                     [facility]  Mitchell Cantrell PA-C  [address] [place] [address]  [phone]      INTERVAL HPI/OVERNIGHT EVENTS:  Pt s/e  Tolerating diet   No new GI events      MEDICATIONS  (STANDING):  albuterol/ipratropium for Nebulization 3 milliLiter(s) Nebulizer every 6 hours  cefTRIAXone   IVPB 1000 milliGRAM(s) IV Intermittent every 24 hours  dextrose 5%. 1000 milliLiter(s) (100 mL/Hr) IV Continuous <Continuous>  dextrose 5%. 1000 milliLiter(s) (50 mL/Hr) IV Continuous <Continuous>  dextrose 50% Injectable 12.5 Gram(s) IV Push once  dextrose 50% Injectable 25 Gram(s) IV Push once  dextrose 50% Injectable 25 Gram(s) IV Push once  glucagon  Injectable 1 milliGRAM(s) IntraMuscular once  heparin   Injectable 5000 Unit(s) SubCutaneous every 8 hours  influenza  Vaccine (HIGH DOSE) 0.7 milliLiter(s) IntraMuscular once  insulin glargine Injectable (LANTUS) 12 Unit(s) SubCutaneous every morning  insulin lispro (ADMELOG) corrective regimen sliding scale   SubCutaneous three times a day before meals  insulin lispro (ADMELOG) corrective regimen sliding scale   SubCutaneous at bedtime  insulin lispro Injectable (ADMELOG) 4 Unit(s) SubCutaneous three times a day before meals  mirtazapine Soltab 15 milliGRAM(s) Oral at bedtime  QUEtiapine 25 milliGRAM(s) Oral at bedtime  sodium chloride 0.9%. 1000 milliLiter(s) (75 mL/Hr) IV Continuous <Continuous>    MEDICATIONS  (PRN):  acetaminophen     Tablet .. 650 milliGRAM(s) Oral every 6 hours PRN Temp greater or equal to 38C (100.4F), Mild Pain (1 - 3)  dextrose Oral Gel 15 Gram(s) Oral once PRN Blood Glucose LESS THAN 70 milliGRAM(s)/deciliter  guaiFENesin Oral Liquid (Sugar-Free) 200 milliGRAM(s) Oral every 6 hours PRN Cough      Allergies  No Known Allergies    Intolerances      Vital Signs Last 24 Hrs  T(C): 36.8 (13 Jan 2024 04:20), Max: 37.3 (12 Jan 2024 13:45)  T(F): 98.3 (13 Jan 2024 04:20), Max: 99.2 (12 Jan 2024 13:45)  HR: 78 (13 Jan 2024 08:00) (78 - 109)  BP: 131/66 (13 Jan 2024 04:20) (127/66 - 149/76)  BP(mean): --  RR: 18 (13 Jan 2024 04:20) (17 - 18)  SpO2: 93% (13 Jan 2024 04:20) (92% - 93%)    Parameters below as of 13 Jan 2024 04:20  Patient On (Oxygen Delivery Method): room air    01-12-24 @ 07:01  -  01-13-24 @ 07:00  --------------------------------------------------------  IN: 900 mL / OUT: 0 mL / NET: 900 mL    GENERAL:  Appears stated age  HEENT:  NC/AT  CHEST:  Full & symmetric excursion  HEART:  Regular rhythm  ABDOMEN:  Soft, non-tender, non-distended  EXTEREMITIES:  no cyanosis  SKIN:  No rash  NEURO:  Alert          LABS:                        8.2    9.49  )-----------( 420      ( 13 Jan 2024 06:37 )             24.5     01-12    136  |  110<H>  |  12  ----------------------------<  180<H>  3.8   |  23  |  0.73    Ca    8.3<L>      12 Jan 2024 06:46    TPro  6.2  /  Alb  2.0<L>  /  TBili  0.3  /  DBili  x   /  AST  15  /  ALT  14  /  AlkPhos  89  01-12

## 2024-01-13 NOTE — PROGRESS NOTE ADULT - SUBJECTIVE AND OBJECTIVE BOX
Date/Time Patient Seen:  		  Referring MD:   Data Reviewed	       Patient is a 83y old  Female who presents with a chief complaint of fever (12 Jan 2024 14:16)      Subjective/HPI     PAST MEDICAL & SURGICAL HISTORY:  DM (diabetes mellitus)    Type 2 diabetes mellitus    Anxiety    OA (osteoarthritis) of knee  (Right knee)    Anemia    Other chronic pancreatitis    DM (diabetes mellitus)    HLD (hyperlipidemia)    Anxiety and depression    Seasonal allergies    Diabetes mellitus, type 2    GERD (gastroesophageal reflux disease)    Chronic joint pain    S/P arthroscopic knee surgery  (Right knee, 2007)    History of back surgery  (2007)    S/P cholecystectomy  (2002)    S/P subtotal gastrectomy  (with gastrojejunal anastomosis, 30 years ago)    S/P knee replacement          Medication list         MEDICATIONS  (STANDING):  albuterol/ipratropium for Nebulization 3 milliLiter(s) Nebulizer every 6 hours  cefTRIAXone   IVPB 1000 milliGRAM(s) IV Intermittent every 24 hours  dextrose 5%. 1000 milliLiter(s) (100 mL/Hr) IV Continuous <Continuous>  dextrose 5%. 1000 milliLiter(s) (50 mL/Hr) IV Continuous <Continuous>  dextrose 50% Injectable 12.5 Gram(s) IV Push once  dextrose 50% Injectable 25 Gram(s) IV Push once  dextrose 50% Injectable 25 Gram(s) IV Push once  glucagon  Injectable 1 milliGRAM(s) IntraMuscular once  heparin   Injectable 5000 Unit(s) SubCutaneous every 8 hours  influenza  Vaccine (HIGH DOSE) 0.7 milliLiter(s) IntraMuscular once  insulin glargine Injectable (LANTUS) 12 Unit(s) SubCutaneous every morning  insulin lispro (ADMELOG) corrective regimen sliding scale   SubCutaneous three times a day before meals  insulin lispro (ADMELOG) corrective regimen sliding scale   SubCutaneous at bedtime  insulin lispro Injectable (ADMELOG) 4 Unit(s) SubCutaneous three times a day before meals  mirtazapine Soltab 15 milliGRAM(s) Oral at bedtime  QUEtiapine 25 milliGRAM(s) Oral at bedtime  sodium chloride 0.9%. 1000 milliLiter(s) (75 mL/Hr) IV Continuous <Continuous>    MEDICATIONS  (PRN):  acetaminophen     Tablet .. 650 milliGRAM(s) Oral every 6 hours PRN Temp greater or equal to 38C (100.4F), Mild Pain (1 - 3)  dextrose Oral Gel 15 Gram(s) Oral once PRN Blood Glucose LESS THAN 70 milliGRAM(s)/deciliter  guaiFENesin Oral Liquid (Sugar-Free) 200 milliGRAM(s) Oral every 6 hours PRN Cough         Vitals log        ICU Vital Signs Last 24 Hrs  T(C): 36.8 (13 Jan 2024 04:20), Max: 37.3 (12 Jan 2024 13:45)  T(F): 98.3 (13 Jan 2024 04:20), Max: 99.2 (12 Jan 2024 13:45)  HR: 98 (13 Jan 2024 04:20) (87 - 109)  BP: 131/66 (13 Jan 2024 04:20) (127/66 - 149/76)  BP(mean): --  ABP: --  ABP(mean): --  RR: 18 (13 Jan 2024 04:20) (17 - 18)  SpO2: 93% (13 Jan 2024 04:20) (92% - 95%)    O2 Parameters below as of 13 Jan 2024 04:20  Patient On (Oxygen Delivery Method): room air                 Input and Output:  I&O's Detail    12 Jan 2024 07:01  -  13 Jan 2024 07:00  --------------------------------------------------------  IN:    sodium chloride 0.9%: 900 mL  Total IN: 900 mL    OUT:  Total OUT: 0 mL    Total NET: 900 mL          Lab Data                        8.2    9.49  )-----------( 420      ( 13 Jan 2024 06:37 )             24.5     01-12    136  |  110<H>  |  12  ----------------------------<  180<H>  3.8   |  23  |  0.73    Ca    8.3<L>      12 Jan 2024 06:46    TPro  6.2  /  Alb  2.0<L>  /  TBili  0.3  /  DBili  x   /  AST  15  /  ALT  14  /  AlkPhos  89  01-12            Review of Systems	      Objective     Physical Examination    heart s1s2  lung dc BS  head nc      Pertinent Lab findings & Imaging      Shanta:  NO   Adequate UO     I&O's Detail    12 Jan 2024 07:01  -  13 Jan 2024 07:00  --------------------------------------------------------  IN:    sodium chloride 0.9%: 900 mL  Total IN: 900 mL    OUT:  Total OUT: 0 mL    Total NET: 900 mL               Discussed with:     Cultures:	        Radiology

## 2024-01-14 LAB
ALBUMIN SERPL ELPH-MCNC: 2.1 G/DL — LOW (ref 3.3–5)
ALBUMIN SERPL ELPH-MCNC: 2.1 G/DL — LOW (ref 3.3–5)
ALP SERPL-CCNC: 84 U/L — SIGNIFICANT CHANGE UP (ref 40–120)
ALP SERPL-CCNC: 84 U/L — SIGNIFICANT CHANGE UP (ref 40–120)
ALT FLD-CCNC: 18 U/L — SIGNIFICANT CHANGE UP (ref 12–78)
ALT FLD-CCNC: 18 U/L — SIGNIFICANT CHANGE UP (ref 12–78)
ANION GAP SERPL CALC-SCNC: 2 MMOL/L — LOW (ref 5–17)
ANION GAP SERPL CALC-SCNC: 2 MMOL/L — LOW (ref 5–17)
AST SERPL-CCNC: 29 U/L — SIGNIFICANT CHANGE UP (ref 15–37)
AST SERPL-CCNC: 29 U/L — SIGNIFICANT CHANGE UP (ref 15–37)
BILIRUB SERPL-MCNC: 0.3 MG/DL — SIGNIFICANT CHANGE UP (ref 0.2–1.2)
BILIRUB SERPL-MCNC: 0.3 MG/DL — SIGNIFICANT CHANGE UP (ref 0.2–1.2)
BUN SERPL-MCNC: 9 MG/DL — SIGNIFICANT CHANGE UP (ref 7–23)
BUN SERPL-MCNC: 9 MG/DL — SIGNIFICANT CHANGE UP (ref 7–23)
CALCIUM SERPL-MCNC: 8.7 MG/DL — SIGNIFICANT CHANGE UP (ref 8.5–10.1)
CALCIUM SERPL-MCNC: 8.7 MG/DL — SIGNIFICANT CHANGE UP (ref 8.5–10.1)
CHLORIDE SERPL-SCNC: 107 MMOL/L — SIGNIFICANT CHANGE UP (ref 96–108)
CHLORIDE SERPL-SCNC: 107 MMOL/L — SIGNIFICANT CHANGE UP (ref 96–108)
CO2 SERPL-SCNC: 27 MMOL/L — SIGNIFICANT CHANGE UP (ref 22–31)
CO2 SERPL-SCNC: 27 MMOL/L — SIGNIFICANT CHANGE UP (ref 22–31)
CREAT SERPL-MCNC: 0.77 MG/DL — SIGNIFICANT CHANGE UP (ref 0.5–1.3)
CREAT SERPL-MCNC: 0.77 MG/DL — SIGNIFICANT CHANGE UP (ref 0.5–1.3)
CULTURE RESULTS: SIGNIFICANT CHANGE UP
EGFR: 76 ML/MIN/1.73M2 — SIGNIFICANT CHANGE UP
EGFR: 76 ML/MIN/1.73M2 — SIGNIFICANT CHANGE UP
GLUCOSE SERPL-MCNC: 198 MG/DL — HIGH (ref 70–99)
GLUCOSE SERPL-MCNC: 198 MG/DL — HIGH (ref 70–99)
HCT VFR BLD CALC: 24.6 % — LOW (ref 34.5–45)
HCT VFR BLD CALC: 24.6 % — LOW (ref 34.5–45)
HGB BLD-MCNC: 8 G/DL — LOW (ref 11.5–15.5)
HGB BLD-MCNC: 8 G/DL — LOW (ref 11.5–15.5)
MCHC RBC-ENTMCNC: 29.6 PG — SIGNIFICANT CHANGE UP (ref 27–34)
MCHC RBC-ENTMCNC: 29.6 PG — SIGNIFICANT CHANGE UP (ref 27–34)
MCHC RBC-ENTMCNC: 32.5 GM/DL — SIGNIFICANT CHANGE UP (ref 32–36)
MCHC RBC-ENTMCNC: 32.5 GM/DL — SIGNIFICANT CHANGE UP (ref 32–36)
MCV RBC AUTO: 91.1 FL — SIGNIFICANT CHANGE UP (ref 80–100)
MCV RBC AUTO: 91.1 FL — SIGNIFICANT CHANGE UP (ref 80–100)
NRBC # BLD: 0 /100 WBCS — SIGNIFICANT CHANGE UP (ref 0–0)
NRBC # BLD: 0 /100 WBCS — SIGNIFICANT CHANGE UP (ref 0–0)
PLATELET # BLD AUTO: 502 K/UL — HIGH (ref 150–400)
PLATELET # BLD AUTO: 502 K/UL — HIGH (ref 150–400)
POTASSIUM SERPL-MCNC: 4.3 MMOL/L — SIGNIFICANT CHANGE UP (ref 3.5–5.3)
POTASSIUM SERPL-MCNC: 4.3 MMOL/L — SIGNIFICANT CHANGE UP (ref 3.5–5.3)
POTASSIUM SERPL-SCNC: 4.3 MMOL/L — SIGNIFICANT CHANGE UP (ref 3.5–5.3)
POTASSIUM SERPL-SCNC: 4.3 MMOL/L — SIGNIFICANT CHANGE UP (ref 3.5–5.3)
PROT SERPL-MCNC: 6.4 G/DL — SIGNIFICANT CHANGE UP (ref 6–8.3)
PROT SERPL-MCNC: 6.4 G/DL — SIGNIFICANT CHANGE UP (ref 6–8.3)
RBC # BLD: 2.7 M/UL — LOW (ref 3.8–5.2)
RBC # BLD: 2.7 M/UL — LOW (ref 3.8–5.2)
RBC # FLD: 14.5 % — SIGNIFICANT CHANGE UP (ref 10.3–14.5)
RBC # FLD: 14.5 % — SIGNIFICANT CHANGE UP (ref 10.3–14.5)
SODIUM SERPL-SCNC: 136 MMOL/L — SIGNIFICANT CHANGE UP (ref 135–145)
SODIUM SERPL-SCNC: 136 MMOL/L — SIGNIFICANT CHANGE UP (ref 135–145)
SPECIMEN SOURCE: SIGNIFICANT CHANGE UP
WBC # BLD: 9.94 K/UL — SIGNIFICANT CHANGE UP (ref 3.8–10.5)
WBC # BLD: 9.94 K/UL — SIGNIFICANT CHANGE UP (ref 3.8–10.5)
WBC # FLD AUTO: 9.94 K/UL — SIGNIFICANT CHANGE UP (ref 3.8–10.5)
WBC # FLD AUTO: 9.94 K/UL — SIGNIFICANT CHANGE UP (ref 3.8–10.5)

## 2024-01-14 RX ORDER — INSULIN LISPRO 100/ML
VIAL (ML) SUBCUTANEOUS AT BEDTIME
Refills: 0 | Status: DISCONTINUED | OUTPATIENT
Start: 2024-01-14 | End: 2024-01-15

## 2024-01-14 RX ORDER — INSULIN LISPRO 100/ML
VIAL (ML) SUBCUTANEOUS
Refills: 0 | Status: DISCONTINUED | OUTPATIENT
Start: 2024-01-14 | End: 2024-01-15

## 2024-01-14 RX ADMIN — MIRTAZAPINE 15 MILLIGRAM(S): 45 TABLET, ORALLY DISINTEGRATING ORAL at 22:14

## 2024-01-14 RX ADMIN — Medication 2: at 08:46

## 2024-01-14 RX ADMIN — Medication 4 UNIT(S): at 08:45

## 2024-01-14 RX ADMIN — INSULIN GLARGINE 12 UNIT(S): 100 INJECTION, SOLUTION SUBCUTANEOUS at 08:46

## 2024-01-14 RX ADMIN — CEFTRIAXONE 100 MILLIGRAM(S): 500 INJECTION, POWDER, FOR SOLUTION INTRAMUSCULAR; INTRAVENOUS at 19:58

## 2024-01-14 RX ADMIN — Medication 3: at 12:14

## 2024-01-14 RX ADMIN — Medication 4 UNIT(S): at 12:13

## 2024-01-14 RX ADMIN — HEPARIN SODIUM 5000 UNIT(S): 5000 INJECTION INTRAVENOUS; SUBCUTANEOUS at 22:14

## 2024-01-14 RX ADMIN — Medication 3 MILLILITER(S): at 07:54

## 2024-01-14 RX ADMIN — Medication 3 MILLILITER(S): at 14:36

## 2024-01-14 RX ADMIN — HEPARIN SODIUM 5000 UNIT(S): 5000 INJECTION INTRAVENOUS; SUBCUTANEOUS at 05:38

## 2024-01-14 RX ADMIN — QUETIAPINE FUMARATE 25 MILLIGRAM(S): 200 TABLET, FILM COATED ORAL at 22:15

## 2024-01-14 NOTE — PROGRESS NOTE ADULT - SUBJECTIVE AND OBJECTIVE BOX
[facility] Cardiology    CHIEF COMPLAINT: Patient is a 83y old  Female who presents with a chief complaint of fever (14 Jan 2024 09:49)      Follow Up: [ ] Chest Pain      [ ] Dyspnea     [ ] Palpitations    [ ] Atrial Fibrillation     [ ] Ventricular Dysrhythmia    [ ] Abnormal EKG                      [ ] Abnormal Cardiac Enzymes     [ ] Valvular Disease    HPI:  83-year-old female PMH of DM2, anxiety/depression, chronic joint and back pain, osteoarthritis, GERD, anemia, exsmoker dementia, presents to the emergency department with son sent by urgent care by ambulance with report of pneumonia.  Patient states for the past week patient has had dry cough, generalized weakness, no appetite, at urgent care patient flu/COVID was negative, concern for patient having 101 fever, low blood pressure, and right sided lung infiltrate.  Pt just came back from [place] and as per son the doctors there had stopped all her meds except Seroquel and mirtazapine , she is not on any meds for diabetes  (09 Jan 2024 07:42)    PAST MEDICAL & SURGICAL HISTORY:  Type 2 diabetes mellitus      Anxiety      OA (osteoarthritis) of knee  (Right knee)      Anemia      Other chronic pancreatitis      Anxiety and depression      Seasonal allergies      Diabetes mellitus, type 2      GERD (gastroesophageal reflux disease)      Chronic joint pain      S/P arthroscopic knee surgery  (Right knee, 2007)      History of back surgery  (2007)      S/P cholecystectomy  (2002)      S/P subtotal gastrectomy  (with gastrojejunal anastomosis, 30 years ago)      S/P knee replacement        MEDICATIONS  (STANDING):  albuterol/ipratropium for Nebulization 3 milliLiter(s) Nebulizer every 6 hours  cefTRIAXone   IVPB 1000 milliGRAM(s) IV Intermittent every 24 hours  dextrose 5%. 1000 milliLiter(s) (100 mL/Hr) IV Continuous <Continuous>  dextrose 5%. 1000 milliLiter(s) (50 mL/Hr) IV Continuous <Continuous>  dextrose 50% Injectable 12.5 Gram(s) IV Push once  dextrose 50% Injectable 25 Gram(s) IV Push once  dextrose 50% Injectable 25 Gram(s) IV Push once  glucagon  Injectable 1 milliGRAM(s) IntraMuscular once  heparin   Injectable 5000 Unit(s) SubCutaneous every 8 hours  influenza  Vaccine (HIGH DOSE) 0.7 milliLiter(s) IntraMuscular once  insulin glargine Injectable (LANTUS) 12 Unit(s) SubCutaneous every morning  insulin lispro (ADMELOG) corrective regimen sliding scale   SubCutaneous three times a day before meals  insulin lispro (ADMELOG) corrective regimen sliding scale   SubCutaneous at bedtime  insulin lispro Injectable (ADMELOG) 4 Unit(s) SubCutaneous three times a day before meals  mirtazapine 15 milliGRAM(s) Oral at bedtime  QUEtiapine 25 milliGRAM(s) Oral at bedtime  sodium chloride 0.9%. 1000 milliLiter(s) (75 mL/Hr) IV Continuous <Continuous>    MEDICATIONS  (PRN):  acetaminophen     Tablet .. 650 milliGRAM(s) Oral every 6 hours PRN Temp greater or equal to 38C (100.4F), Mild Pain (1 - 3)  dextrose Oral Gel 15 Gram(s) Oral once PRN Blood Glucose LESS THAN 70 milliGRAM(s)/deciliter  guaiFENesin Oral Liquid (Sugar-Free) 200 milliGRAM(s) Oral every 6 hours PRN Cough    Allergies    No Known Allergies    Intolerances        REVIEW OF SYSTEMS:    CONSTITUTIONAL: No weakness, fevers or chills.   EYES/ENT: No visual changes;    NECK: No pain or stiffness  RESPIRATORY: No cough, wheezing, No shortness of breath  CARDIOVASCULAR: No chest pain or palpitations  GASTROINTESTINAL: No abdominal pain, or hematochezia.  GENITOURINARY: No dysuria orhematuria  NEUROLOGICAL: No numbness or weakness  SKIN: No itching, burning, rashes  All other review of systems is negative unless indicated above    Vital Signs Last 24 Hrs  T(C): 37.2 (14 Jan 2024 05:10), Max: 37.2 (14 Jan 2024 05:10)  T(F): 99 (14 Jan 2024 05:10), Max: 99 (14 Jan 2024 05:10)  HR: 88 (14 Jan 2024 07:55) (72 - 113)  BP: 156/71 (14 Jan 2024 05:10) (129/73 - 156/71)  BP(mean): --  RR: 18 (14 Jan 2024 05:10) (18 - 18)  SpO2: 94% (14 Jan 2024 05:10) (94% - 97%)    Parameters below as of 14 Jan 2024 05:10  Patient On (Oxygen Delivery Method): room air      I&O's Summary    13 Jan 2024 07:01  -  14 Jan 2024 07:00  --------------------------------------------------------  IN: 1400 mL / OUT: 0 mL / NET: 1400 mL        PHYSICAL EXAM:  Constitutional: NAD  Neurological: Alert, no focal deficits  HEENT: no JVD, EOMI  Cardiovascular: Regular, S1 and S2, no murmur  Pulmonary: breath sounds bilaterally  Gastrointestinal: Bowel Sounds present, soft, nontender  EXT:  no peripheral edema  Skin: No rashes.  Psych:  Mood calm  LABS: All Labs Reviewed:                          8.0    9.94  )-----------( 502      ( 14 Jan 2024 06:35 )             24.6     01-14    136  |  107  |  9   ----------------------------<  198<H>  4.3   |  27  |  0.77    Ca    8.7      14 Jan 2024 06:35    TPro  6.4  /  Alb  2.1<L>  /  TBili  0.3  /  DBili  x   /  AST  29  /  ALT  18  /  AlkPhos  84  01-14      Assessment and Plan:   · Assessment    83F with DM2, GERD, dementia who presents with SOB, fever, cough and generalized weakness found with PNA on CT. ECG is normal.  Doubt ACS    Suggest:    1. PNA  - Treat with Abx    2. Multiple CAD risk factors  - Recommend Lipitor 20mg daily    3. Pt less agitated   4. Anemia, 6.8 --> 8.0 .  No plan for endoscopy at this time.    Will follow prn.   call if needed                   [facility] Cardiology    CHIEF COMPLAINT: Patient is a 83y old  Female who presents with a chief complaint of fever (14 Jan 2024 09:49)      Follow Up: [ ] Chest Pain      [ ] Dyspnea     [ ] Palpitations    [ ] Atrial Fibrillation     [ ] Ventricular Dysrhythmia    [ ] Abnormal EKG                      [ ] Abnormal Cardiac Enzymes     [ ] Valvular Disease    HPI:  83-year-old female PMH of DM2, anxiety/depression, chronic joint and back pain, osteoarthritis, GERD, anemia, exsmoker dementia, presents to the emergency department with son sent by urgent care by ambulance with report of pneumonia.  Patient states for the past week patient has had dry cough, generalized weakness, no appetite, at urgent care patient flu/COVID was negative, concern for patient having 101 fever, low blood pressure, and right sided lung infiltrate.  Pt just came back from [place] and as per son the doctors there had stopped all her meds except Seroquel and mirtazapine , she is not on any meds for diabetes  (09 Jan 2024 07:42)    PAST MEDICAL & SURGICAL HISTORY:  Type 2 diabetes mellitus      Anxiety      OA (osteoarthritis) of knee  (Right knee)      Anemia      Other chronic pancreatitis      Anxiety and depression      Seasonal allergies      Diabetes mellitus, type 2      GERD (gastroesophageal reflux disease)      Chronic joint pain      S/P arthroscopic knee surgery  (Right knee, 2007)      History of back surgery  (2007)      S/P cholecystectomy  (2002)      S/P subtotal gastrectomy  (with gastrojejunal anastomosis, 30 years ago)      S/P knee replacement        MEDICATIONS  (STANDING):  albuterol/ipratropium for Nebulization 3 milliLiter(s) Nebulizer every 6 hours  cefTRIAXone   IVPB 1000 milliGRAM(s) IV Intermittent every 24 hours  dextrose 5%. 1000 milliLiter(s) (100 mL/Hr) IV Continuous <Continuous>  dextrose 5%. 1000 milliLiter(s) (50 mL/Hr) IV Continuous <Continuous>  dextrose 50% Injectable 12.5 Gram(s) IV Push once  dextrose 50% Injectable 25 Gram(s) IV Push once  dextrose 50% Injectable 25 Gram(s) IV Push once  glucagon  Injectable 1 milliGRAM(s) IntraMuscular once  heparin   Injectable 5000 Unit(s) SubCutaneous every 8 hours  influenza  Vaccine (HIGH DOSE) 0.7 milliLiter(s) IntraMuscular once  insulin glargine Injectable (LANTUS) 12 Unit(s) SubCutaneous every morning  insulin lispro (ADMELOG) corrective regimen sliding scale   SubCutaneous three times a day before meals  insulin lispro (ADMELOG) corrective regimen sliding scale   SubCutaneous at bedtime  insulin lispro Injectable (ADMELOG) 4 Unit(s) SubCutaneous three times a day before meals  mirtazapine 15 milliGRAM(s) Oral at bedtime  QUEtiapine 25 milliGRAM(s) Oral at bedtime  sodium chloride 0.9%. 1000 milliLiter(s) (75 mL/Hr) IV Continuous <Continuous>    MEDICATIONS  (PRN):  acetaminophen     Tablet .. 650 milliGRAM(s) Oral every 6 hours PRN Temp greater or equal to 38C (100.4F), Mild Pain (1 - 3)  dextrose Oral Gel 15 Gram(s) Oral once PRN Blood Glucose LESS THAN 70 milliGRAM(s)/deciliter  guaiFENesin Oral Liquid (Sugar-Free) 200 milliGRAM(s) Oral every 6 hours PRN Cough    Allergies    No Known Allergies    Intolerances        REVIEW OF SYSTEMS:    CONSTITUTIONAL: No weakness, fevers or chills.   EYES/ENT: No visual changes;    NECK: No pain or stiffness  RESPIRATORY: No cough, wheezing, No shortness of breath  CARDIOVASCULAR: No chest pain or palpitations  GASTROINTESTINAL: No abdominal pain, or hematochezia.  GENITOURINARY: No dysuria orhematuria  NEUROLOGICAL: No numbness or weakness  SKIN: No itching, burning, rashes  All other review of systems is negative unless indicated above    Vital Signs Last 24 Hrs  T(C): 37.2 (14 Jan 2024 05:10), Max: 37.2 (14 Jan 2024 05:10)  T(F): 99 (14 Jan 2024 05:10), Max: 99 (14 Jan 2024 05:10)  HR: 88 (14 Jan 2024 07:55) (72 - 113)  BP: 156/71 (14 Jan 2024 05:10) (129/73 - 156/71)  BP(mean): --  RR: 18 (14 Jan 2024 05:10) (18 - 18)  SpO2: 94% (14 Jan 2024 05:10) (94% - 97%)    Parameters below as of 14 Jan 2024 05:10  Patient On (Oxygen Delivery Method): room air      I&O's Summary    13 Jan 2024 07:01  -  14 Jan 2024 07:00  --------------------------------------------------------  IN: 1400 mL / OUT: 0 mL / NET: 1400 mL        PHYSICAL EXAM:  Constitutional: NAD  Neurological: Alert, no focal deficits  HEENT: no JVD, EOMI  Cardiovascular: Regular, S1 and S2, no murmur  Pulmonary: breath sounds bilaterally  Gastrointestinal: Bowel Sounds present, soft, nontender  EXT:  no peripheral edema  Skin: No rashes.  Psych:  Mood calm  LABS: All Labs Reviewed:                          8.0    9.94  )-----------( 502      ( 14 Jan 2024 06:35 )             24.6     01-14    136  |  107  |  9   ----------------------------<  198<H>  4.3   |  27  |  0.77    Ca    8.7      14 Jan 2024 06:35    TPro  6.4  /  Alb  2.1<L>  /  TBili  0.3  /  DBili  x   /  AST  29  /  ALT  18  /  AlkPhos  84  01-14      Assessment and Plan:   · Assessment    83F with DM2, GERD, dementia who presents with SOB, fever, cough and generalized weakness found with PNA on CT. ECG is normal.  Doubt ACS    Suggest:    1. PNA  - Treat with Abx    2. Multiple CAD risk factors  - Recommend Lipitor 20mg daily    3. Pt less agitated   4. Anemia, 6.8 --> 8.0 .  No plan for endoscopy at this time.    Will follow prn.   call if needed

## 2024-01-14 NOTE — PROGRESS NOTE ADULT - SUBJECTIVE AND OBJECTIVE BOX
Patient is a 83y old  Female who presents with a chief complaint of fever (14 Jan 2024 11:53)    Date of servie : 01-14-24 @ 14:00  INTERVAL HPI/OVERNIGHT EVENTS:  T(C): 37.2 (01-14-24 @ 12:20), Max: 37.2 (01-14-24 @ 05:10)  HR: 100 (01-14-24 @ 12:20) (88 - 113)  BP: 134/75 (01-14-24 @ 12:20) (134/75 - 156/71)  RR: 18 (01-14-24 @ 12:20) (18 - 18)  SpO2: 95% (01-14-24 @ 12:20) (94% - 97%)  Wt(kg): --  I&O's Summary    13 Jan 2024 07:01  -  14 Jan 2024 07:00  --------------------------------------------------------  IN: 1400 mL / OUT: 0 mL / NET: 1400 mL        LABS:                        8.0    9.94  )-----------( 502      ( 14 Jan 2024 06:35 )             24.6     01-14    136  |  107  |  9   ----------------------------<  198<H>  4.3   |  27  |  0.77    Ca    8.7      14 Jan 2024 06:35    TPro  6.4  /  Alb  2.1<L>  /  TBili  0.3  /  DBili  x   /  AST  29  /  ALT  18  /  AlkPhos  84  01-14      Urinalysis Basic - ( 14 Jan 2024 06:35 )    Color: x / Appearance: x / SG: x / pH: x  Gluc: 198 mg/dL / Ketone: x  / Bili: x / Urobili: x   Blood: x / Protein: x / Nitrite: x   Leuk Esterase: x / RBC: x / WBC x   Sq Epi: x / Non Sq Epi: x / Bacteria: x      CAPILLARY BLOOD GLUCOSE      POCT Blood Glucose.: 269 mg/dL (14 Jan 2024 11:57)  POCT Blood Glucose.: 202 mg/dL (14 Jan 2024 07:50)  POCT Blood Glucose.: 395 mg/dL (13 Jan 2024 21:34)  POCT Blood Glucose.: 173 mg/dL (13 Jan 2024 16:53)        Urinalysis Basic - ( 14 Jan 2024 06:35 )    Color: x / Appearance: x / SG: x / pH: x  Gluc: 198 mg/dL / Ketone: x  / Bili: x / Urobili: x   Blood: x / Protein: x / Nitrite: x   Leuk Esterase: x / RBC: x / WBC x   Sq Epi: x / Non Sq Epi: x / Bacteria: x        MEDICATIONS  (STANDING):  albuterol/ipratropium for Nebulization 3 milliLiter(s) Nebulizer every 6 hours  cefTRIAXone   IVPB 1000 milliGRAM(s) IV Intermittent every 24 hours  dextrose 5%. 1000 milliLiter(s) (100 mL/Hr) IV Continuous <Continuous>  dextrose 5%. 1000 milliLiter(s) (50 mL/Hr) IV Continuous <Continuous>  dextrose 50% Injectable 12.5 Gram(s) IV Push once  dextrose 50% Injectable 25 Gram(s) IV Push once  dextrose 50% Injectable 25 Gram(s) IV Push once  glucagon  Injectable 1 milliGRAM(s) IntraMuscular once  heparin   Injectable 5000 Unit(s) SubCutaneous every 8 hours  influenza  Vaccine (HIGH DOSE) 0.7 milliLiter(s) IntraMuscular once  insulin glargine Injectable (LANTUS) 12 Unit(s) SubCutaneous every morning  insulin lispro (ADMELOG) corrective regimen sliding scale   SubCutaneous three times a day before meals  insulin lispro (ADMELOG) corrective regimen sliding scale   SubCutaneous at bedtime  insulin lispro Injectable (ADMELOG) 4 Unit(s) SubCutaneous three times a day before meals  mirtazapine 15 milliGRAM(s) Oral at bedtime  QUEtiapine 25 milliGRAM(s) Oral at bedtime  sodium chloride 0.9%. 1000 milliLiter(s) (75 mL/Hr) IV Continuous <Continuous>    MEDICATIONS  (PRN):  acetaminophen     Tablet .. 650 milliGRAM(s) Oral every 6 hours PRN Temp greater or equal to 38C (100.4F), Mild Pain (1 - 3)  dextrose Oral Gel 15 Gram(s) Oral once PRN Blood Glucose LESS THAN 70 milliGRAM(s)/deciliter  guaiFENesin Oral Liquid (Sugar-Free) 200 milliGRAM(s) Oral every 6 hours PRN Cough          PHYSICAL EXAM:  GENERAL: frail  CHEST/LUNG: Clear to percussion bilaterally; No rales, rhonchi, wheezing, or rubs  HEART: Regular rate and rhythm; No murmurs, rubs, or gallops  ABDOMEN: Soft, Nontender, Nondistended; Bowel sounds present  EXTREMITIES:  2+ Peripheral Pulses, No clubbing, cyanosis, or edema  LYMPH: No lymphadenopathy noted  SKIN: No rashes or lesions    Care Discussed with Consultants/Other Providers [x ] YES  [ ] NO

## 2024-01-14 NOTE — PROGRESS NOTE ADULT - SUBJECTIVE AND OBJECTIVE BOX
Date/Time Patient Seen:  		  Referring MD:   Data Reviewed	       Patient is a 83y old  Female who presents with a chief complaint of fever (13 Jan 2024 15:38)      Subjective/HPI     PAST MEDICAL & SURGICAL HISTORY:  DM (diabetes mellitus)    Type 2 diabetes mellitus    Anxiety    OA (osteoarthritis) of knee  (Right knee)    Anemia    Other chronic pancreatitis    DM (diabetes mellitus)    HLD (hyperlipidemia)    Anxiety and depression    Seasonal allergies    Diabetes mellitus, type 2    GERD (gastroesophageal reflux disease)    Chronic joint pain    S/P arthroscopic knee surgery  (Right knee, 2007)    History of back surgery  (2007)    S/P cholecystectomy  (2002)    S/P subtotal gastrectomy  (with gastrojejunal anastomosis, 30 years ago)    S/P knee replacement          Medication list         MEDICATIONS  (STANDING):  albuterol/ipratropium for Nebulization 3 milliLiter(s) Nebulizer every 6 hours  cefTRIAXone   IVPB 1000 milliGRAM(s) IV Intermittent every 24 hours  dextrose 5%. 1000 milliLiter(s) (100 mL/Hr) IV Continuous <Continuous>  dextrose 5%. 1000 milliLiter(s) (50 mL/Hr) IV Continuous <Continuous>  dextrose 50% Injectable 12.5 Gram(s) IV Push once  dextrose 50% Injectable 25 Gram(s) IV Push once  dextrose 50% Injectable 25 Gram(s) IV Push once  glucagon  Injectable 1 milliGRAM(s) IntraMuscular once  heparin   Injectable 5000 Unit(s) SubCutaneous every 8 hours  influenza  Vaccine (HIGH DOSE) 0.7 milliLiter(s) IntraMuscular once  insulin glargine Injectable (LANTUS) 12 Unit(s) SubCutaneous every morning  insulin lispro (ADMELOG) corrective regimen sliding scale   SubCutaneous at bedtime  insulin lispro (ADMELOG) corrective regimen sliding scale   SubCutaneous three times a day before meals  insulin lispro Injectable (ADMELOG) 4 Unit(s) SubCutaneous three times a day before meals  mirtazapine 15 milliGRAM(s) Oral at bedtime  QUEtiapine 25 milliGRAM(s) Oral at bedtime  sodium chloride 0.9%. 1000 milliLiter(s) (75 mL/Hr) IV Continuous <Continuous>    MEDICATIONS  (PRN):  acetaminophen     Tablet .. 650 milliGRAM(s) Oral every 6 hours PRN Temp greater or equal to 38C (100.4F), Mild Pain (1 - 3)  dextrose Oral Gel 15 Gram(s) Oral once PRN Blood Glucose LESS THAN 70 milliGRAM(s)/deciliter  guaiFENesin Oral Liquid (Sugar-Free) 200 milliGRAM(s) Oral every 6 hours PRN Cough         Vitals log        ICU Vital Signs Last 24 Hrs  T(C): 37.1 (13 Jan 2024 20:50), Max: 37.1 (13 Jan 2024 20:50)  T(F): 98.7 (13 Jan 2024 20:50), Max: 98.7 (13 Jan 2024 20:50)  HR: 113 (13 Jan 2024 20:50) (72 - 113)  BP: 140/76 (13 Jan 2024 20:50) (129/73 - 140/76)  BP(mean): --  ABP: --  ABP(mean): --  RR: 18 (13 Jan 2024 20:50) (18 - 18)  SpO2: 97% (13 Jan 2024 20:50) (97% - 97%)    O2 Parameters below as of 13 Jan 2024 20:50  Patient On (Oxygen Delivery Method): room air                 Input and Output:  I&O's Detail    12 Jan 2024 07:01  -  13 Jan 2024 07:00  --------------------------------------------------------  IN:    sodium chloride 0.9%: 900 mL  Total IN: 900 mL    OUT:  Total OUT: 0 mL    Total NET: 900 mL      13 Jan 2024 07:01  -  14 Jan 2024 05:06  --------------------------------------------------------  IN:    IV PiggyBack: 50 mL    sodium chloride 0.9%: 1350 mL  Total IN: 1400 mL    OUT:  Total OUT: 0 mL    Total NET: 1400 mL          Lab Data                        8.2    9.49  )-----------( 420      ( 13 Jan 2024 06:37 )             24.5     01-12    136  |  110<H>  |  12  ----------------------------<  180<H>  3.8   |  23  |  0.73    Ca    8.3<L>      12 Jan 2024 06:46    TPro  6.2  /  Alb  2.0<L>  /  TBili  0.3  /  DBili  x   /  AST  15  /  ALT  14  /  AlkPhos  89  01-12            Review of Systems	      Objective     Physical Examination    heart s1s2  lung dc BS  head nc      Pertinent Lab findings & Imaging      Shanta:  NO   Adequate UO     I&O's Detail    12 Jan 2024 07:01  -  13 Jan 2024 07:00  --------------------------------------------------------  IN:    sodium chloride 0.9%: 900 mL  Total IN: 900 mL    OUT:  Total OUT: 0 mL    Total NET: 900 mL      13 Jan 2024 07:01  -  14 Jan 2024 05:06  --------------------------------------------------------  IN:    IV PiggyBack: 50 mL    sodium chloride 0.9%: 1350 mL  Total IN: 1400 mL    OUT:  Total OUT: 0 mL    Total NET: 1400 mL               Discussed with:     Cultures:	        Radiology

## 2024-01-14 NOTE — PROGRESS NOTE ADULT - PROBLEM SELECTOR PLAN 1
cont lantus 12 units qam  cont admelog 4 units 3x/day before meals  change mod dose admelog corrective scale coverage qac/qhs  cont cons cho diet  goal bg 100-180 in hosp setting

## 2024-01-14 NOTE — PROGRESS NOTE ADULT - SUBJECTIVE AND OBJECTIVE BOX
[facility]  Mitchell Cantrell PA-C  [address] [place] [address]  [phone]      INTERVAL HPI/OVERNIGHT EVENTS:  Pt s/e  Tolerating diet   No new GI events      MEDICATIONS  (STANDING):  albuterol/ipratropium for Nebulization 3 milliLiter(s) Nebulizer every 6 hours  cefTRIAXone   IVPB 1000 milliGRAM(s) IV Intermittent every 24 hours  dextrose 5%. 1000 milliLiter(s) (100 mL/Hr) IV Continuous <Continuous>  dextrose 5%. 1000 milliLiter(s) (50 mL/Hr) IV Continuous <Continuous>  dextrose 50% Injectable 12.5 Gram(s) IV Push once  dextrose 50% Injectable 25 Gram(s) IV Push once  dextrose 50% Injectable 25 Gram(s) IV Push once  glucagon  Injectable 1 milliGRAM(s) IntraMuscular once  heparin   Injectable 5000 Unit(s) SubCutaneous every 8 hours  influenza  Vaccine (HIGH DOSE) 0.7 milliLiter(s) IntraMuscular once  insulin glargine Injectable (LANTUS) 12 Unit(s) SubCutaneous every morning  insulin lispro (ADMELOG) corrective regimen sliding scale   SubCutaneous three times a day before meals  insulin lispro (ADMELOG) corrective regimen sliding scale   SubCutaneous at bedtime  insulin lispro Injectable (ADMELOG) 4 Unit(s) SubCutaneous three times a day before meals  mirtazapine 15 milliGRAM(s) Oral at bedtime  QUEtiapine 25 milliGRAM(s) Oral at bedtime  sodium chloride 0.9%. 1000 milliLiter(s) (75 mL/Hr) IV Continuous <Continuous>    MEDICATIONS  (PRN):  acetaminophen     Tablet .. 650 milliGRAM(s) Oral every 6 hours PRN Temp greater or equal to 38C (100.4F), Mild Pain (1 - 3)  dextrose Oral Gel 15 Gram(s) Oral once PRN Blood Glucose LESS THAN 70 milliGRAM(s)/deciliter  guaiFENesin Oral Liquid (Sugar-Free) 200 milliGRAM(s) Oral every 6 hours PRN Cough      Allergies  No Known Allergies    Intolerances      Vital Signs Last 24 Hrs  T(C): 37.2 (14 Jan 2024 05:10), Max: 37.2 (14 Jan 2024 05:10)  T(F): 99 (14 Jan 2024 05:10), Max: 99 (14 Jan 2024 05:10)  HR: 88 (14 Jan 2024 07:55) (72 - 113)  BP: 156/71 (14 Jan 2024 05:10) (129/73 - 156/71)  BP(mean): --  RR: 18 (14 Jan 2024 05:10) (18 - 18)  SpO2: 94% (14 Jan 2024 05:10) (94% - 97%)    Parameters below as of 14 Jan 2024 05:10  Patient On (Oxygen Delivery Method): room air    GENERAL:  Appears stated age  HEENT:  NC/AT  CHEST:  Full & symmetric excursion  HEART:  Regular rhythm  ABDOMEN:  Soft, non-tender, non-distended  EXTEREMITIES:  no cyanosis  SKIN:  No rash  NEURO:  Alert          LABS:                        8.0    9.94  )-----------( 502      ( 14 Jan 2024 06:35 )             24.6     01-14    136  |  107  |  9   ----------------------------<  198<H>  4.3   |  27  |  0.77    Ca    8.7      14 Jan 2024 06:35    TPro  6.4  /  Alb  2.1<L>  /  TBili  0.3  /  DBili  x   /  AST  29  /  ALT  18  /  AlkPhos  84  01-14                     [facility]  Mitchell Cantrell PA-C  [address] [place] [address]  [phone]      INTERVAL HPI/OVERNIGHT EVENTS:  Pt s/e  Tolerating diet   No new GI events      MEDICATIONS  (STANDING):  albuterol/ipratropium for Nebulization 3 milliLiter(s) Nebulizer every 6 hours  cefTRIAXone   IVPB 1000 milliGRAM(s) IV Intermittent every 24 hours  dextrose 5%. 1000 milliLiter(s) (100 mL/Hr) IV Continuous <Continuous>  dextrose 5%. 1000 milliLiter(s) (50 mL/Hr) IV Continuous <Continuous>  dextrose 50% Injectable 12.5 Gram(s) IV Push once  dextrose 50% Injectable 25 Gram(s) IV Push once  dextrose 50% Injectable 25 Gram(s) IV Push once  glucagon  Injectable 1 milliGRAM(s) IntraMuscular once  heparin   Injectable 5000 Unit(s) SubCutaneous every 8 hours  influenza  Vaccine (HIGH DOSE) 0.7 milliLiter(s) IntraMuscular once  insulin glargine Injectable (LANTUS) 12 Unit(s) SubCutaneous every morning  insulin lispro (ADMELOG) corrective regimen sliding scale   SubCutaneous three times a day before meals  insulin lispro (ADMELOG) corrective regimen sliding scale   SubCutaneous at bedtime  insulin lispro Injectable (ADMELOG) 4 Unit(s) SubCutaneous three times a day before meals  mirtazapine 15 milliGRAM(s) Oral at bedtime  QUEtiapine 25 milliGRAM(s) Oral at bedtime  sodium chloride 0.9%. 1000 milliLiter(s) (75 mL/Hr) IV Continuous <Continuous>    MEDICATIONS  (PRN):  acetaminophen     Tablet .. 650 milliGRAM(s) Oral every 6 hours PRN Temp greater or equal to 38C (100.4F), Mild Pain (1 - 3)  dextrose Oral Gel 15 Gram(s) Oral once PRN Blood Glucose LESS THAN 70 milliGRAM(s)/deciliter  guaiFENesin Oral Liquid (Sugar-Free) 200 milliGRAM(s) Oral every 6 hours PRN Cough      Allergies  No Known Allergies    Intolerances      Vital Signs Last 24 Hrs  T(C): 37.2 (14 Jan 2024 05:10), Max: 37.2 (14 Jan 2024 05:10)  T(F): 99 (14 Jan 2024 05:10), Max: 99 (14 Jan 2024 05:10)  HR: 88 (14 Jan 2024 07:55) (72 - 113)  BP: 156/71 (14 Jan 2024 05:10) (129/73 - 156/71)  BP(mean): --  RR: 18 (14 Jan 2024 05:10) (18 - 18)  SpO2: 94% (14 Jan 2024 05:10) (94% - 97%)    Parameters below as of 14 Jan 2024 05:10  Patient On (Oxygen Delivery Method): room air    GENERAL:  Appears stated age  HEENT:  NC/AT  CHEST:  Full & symmetric excursion  HEART:  Regular rhythm  ABDOMEN:  Soft, non-tender, non-distended  EXTEREMITIES:  no cyanosis  SKIN:  No rash  NEURO:  Alert          LABS:                        8.0    9.94  )-----------( 502      ( 14 Jan 2024 06:35 )             24.6     01-14    136  |  107  |  9   ----------------------------<  198<H>  4.3   |  27  |  0.77    Ca    8.7      14 Jan 2024 06:35    TPro  6.4  /  Alb  2.1<L>  /  TBili  0.3  /  DBili  x   /  AST  29  /  ALT  18  /  AlkPhos  84  01-14

## 2024-01-14 NOTE — PROGRESS NOTE ADULT - SUBJECTIVE AND OBJECTIVE BOX
CAPILLARY BLOOD GLUCOSE      POCT Blood Glucose.: 269 mg/dL (14 Jan 2024 11:57)  POCT Blood Glucose.: 202 mg/dL (14 Jan 2024 07:50)  POCT Blood Glucose.: 395 mg/dL (13 Jan 2024 21:34)  POCT Blood Glucose.: 173 mg/dL (13 Jan 2024 16:53)      Vital Signs Last 24 Hrs  T(C): 37.2 (14 Jan 2024 12:20), Max: 37.2 (14 Jan 2024 05:10)  T(F): 99 (14 Jan 2024 12:20), Max: 99 (14 Jan 2024 05:10)  HR: 78 (14 Jan 2024 14:05) (78 - 113)  BP: 134/75 (14 Jan 2024 12:20) (134/75 - 156/71)  BP(mean): --  RR: 18 (14 Jan 2024 12:20) (18 - 18)  SpO2: 95% (14 Jan 2024 12:20) (94% - 97%)    Parameters below as of 14 Jan 2024 12:20  Patient On (Oxygen Delivery Method): room air  O2 Flow (L/min): 23      Respiratory: CTA B/L  CV: RRR, S1S2, no murmurs, rubs or gallops  Abdominal: Soft, NT, ND +BS, Last BM  Extremities: No edema, + peripheral pulses     01-14    136  |  107  |  9   ----------------------------<  198<H>  4.3   |  27  |  0.77    Ca    8.7      14 Jan 2024 06:35    TPro  6.4  /  Alb  2.1<L>  /  TBili  0.3  /  DBili  x   /  AST  29  /  ALT  18  /  AlkPhos  84  01-14      dextrose 50% Injectable 12.5 Gram(s) IV Push once  dextrose 50% Injectable 25 Gram(s) IV Push once  dextrose 50% Injectable 25 Gram(s) IV Push once  dextrose Oral Gel 15 Gram(s) Oral once PRN  glucagon  Injectable 1 milliGRAM(s) IntraMuscular once  insulin glargine Injectable (LANTUS) 12 Unit(s) SubCutaneous every morning  insulin lispro (ADMELOG) corrective regimen sliding scale   SubCutaneous three times a day before meals  insulin lispro (ADMELOG) corrective regimen sliding scale   SubCutaneous at bedtime  insulin lispro Injectable (ADMELOG) 4 Unit(s) SubCutaneous three times a day before meals

## 2024-01-14 NOTE — PROGRESS NOTE ADULT - ASSESSMENT
83-year-old female PMH of DM2, anxiety/depression, chronic joint and back pain, osteoarthritis, GERD, anemia, ex-smoker dementia, presents to the emergency department with son sent by urgent care by ambulance with report of pneumonia.    pna  necrotizing pna?  anxiety  depression  OP  OA  DM  GERD  Anemia  Dementia  Ex smoker    ABX  on IVF  ID eval noted  Strep Pna Ag positive  ENDO eval noted    ID eval  CT chest reviewed  PNA eval - necrotizing pna reported on CT  biomarkers  cx -   sputum -   monitor VS and HD and Sat  legionella - strep Ag -   discussion about TB risk  cough rx regimen  bronchodilators for cough assist and mucociliary clearance  goal sat > 88 pct  Pall Eval - GOC discussion

## 2024-01-14 NOTE — PROGRESS NOTE ADULT - PROBLEM/PLAN-1
Flonase 2 puffs in each nostril daily X 1 week, then 1 puff in each nostril daily.    Antihistamine daily
DISPLAY PLAN FREE TEXT

## 2024-01-15 ENCOUNTER — TRANSCRIPTION ENCOUNTER (OUTPATIENT)
Age: 84
End: 2024-01-15

## 2024-01-15 VITALS — OXYGEN SATURATION: 97 %

## 2024-01-15 LAB
ALBUMIN SERPL ELPH-MCNC: 2.4 G/DL — LOW (ref 3.3–5)
ALBUMIN SERPL ELPH-MCNC: 2.4 G/DL — LOW (ref 3.3–5)
ALP SERPL-CCNC: 100 U/L — SIGNIFICANT CHANGE UP (ref 40–120)
ALP SERPL-CCNC: 100 U/L — SIGNIFICANT CHANGE UP (ref 40–120)
ALT FLD-CCNC: 23 U/L — SIGNIFICANT CHANGE UP (ref 12–78)
ALT FLD-CCNC: 23 U/L — SIGNIFICANT CHANGE UP (ref 12–78)
ANION GAP SERPL CALC-SCNC: 4 MMOL/L — LOW (ref 5–17)
ANION GAP SERPL CALC-SCNC: 4 MMOL/L — LOW (ref 5–17)
AST SERPL-CCNC: 36 U/L — SIGNIFICANT CHANGE UP (ref 15–37)
AST SERPL-CCNC: 36 U/L — SIGNIFICANT CHANGE UP (ref 15–37)
BILIRUB SERPL-MCNC: 0.2 MG/DL — SIGNIFICANT CHANGE UP (ref 0.2–1.2)
BILIRUB SERPL-MCNC: 0.2 MG/DL — SIGNIFICANT CHANGE UP (ref 0.2–1.2)
BUN SERPL-MCNC: 8 MG/DL — SIGNIFICANT CHANGE UP (ref 7–23)
BUN SERPL-MCNC: 8 MG/DL — SIGNIFICANT CHANGE UP (ref 7–23)
CALCIUM SERPL-MCNC: 9 MG/DL — SIGNIFICANT CHANGE UP (ref 8.5–10.1)
CALCIUM SERPL-MCNC: 9 MG/DL — SIGNIFICANT CHANGE UP (ref 8.5–10.1)
CHLORIDE SERPL-SCNC: 106 MMOL/L — SIGNIFICANT CHANGE UP (ref 96–108)
CHLORIDE SERPL-SCNC: 106 MMOL/L — SIGNIFICANT CHANGE UP (ref 96–108)
CO2 SERPL-SCNC: 26 MMOL/L — SIGNIFICANT CHANGE UP (ref 22–31)
CO2 SERPL-SCNC: 26 MMOL/L — SIGNIFICANT CHANGE UP (ref 22–31)
CREAT SERPL-MCNC: 0.72 MG/DL — SIGNIFICANT CHANGE UP (ref 0.5–1.3)
CREAT SERPL-MCNC: 0.72 MG/DL — SIGNIFICANT CHANGE UP (ref 0.5–1.3)
EGFR: 83 ML/MIN/1.73M2 — SIGNIFICANT CHANGE UP
EGFR: 83 ML/MIN/1.73M2 — SIGNIFICANT CHANGE UP
GLUCOSE SERPL-MCNC: 156 MG/DL — HIGH (ref 70–99)
GLUCOSE SERPL-MCNC: 156 MG/DL — HIGH (ref 70–99)
HCT VFR BLD CALC: 26.5 % — LOW (ref 34.5–45)
HCT VFR BLD CALC: 26.5 % — LOW (ref 34.5–45)
HGB BLD-MCNC: 8.7 G/DL — LOW (ref 11.5–15.5)
HGB BLD-MCNC: 8.7 G/DL — LOW (ref 11.5–15.5)
MCHC RBC-ENTMCNC: 28.9 PG — SIGNIFICANT CHANGE UP (ref 27–34)
MCHC RBC-ENTMCNC: 28.9 PG — SIGNIFICANT CHANGE UP (ref 27–34)
MCHC RBC-ENTMCNC: 32.8 GM/DL — SIGNIFICANT CHANGE UP (ref 32–36)
MCHC RBC-ENTMCNC: 32.8 GM/DL — SIGNIFICANT CHANGE UP (ref 32–36)
MCV RBC AUTO: 88 FL — SIGNIFICANT CHANGE UP (ref 80–100)
MCV RBC AUTO: 88 FL — SIGNIFICANT CHANGE UP (ref 80–100)
MRSA PCR RESULT.: SIGNIFICANT CHANGE UP
NRBC # BLD: 0 /100 WBCS — SIGNIFICANT CHANGE UP (ref 0–0)
NRBC # BLD: 0 /100 WBCS — SIGNIFICANT CHANGE UP (ref 0–0)
PLATELET # BLD AUTO: 607 K/UL — HIGH (ref 150–400)
PLATELET # BLD AUTO: 607 K/UL — HIGH (ref 150–400)
POTASSIUM SERPL-MCNC: 4.1 MMOL/L — SIGNIFICANT CHANGE UP (ref 3.5–5.3)
POTASSIUM SERPL-MCNC: 4.1 MMOL/L — SIGNIFICANT CHANGE UP (ref 3.5–5.3)
POTASSIUM SERPL-SCNC: 4.1 MMOL/L — SIGNIFICANT CHANGE UP (ref 3.5–5.3)
POTASSIUM SERPL-SCNC: 4.1 MMOL/L — SIGNIFICANT CHANGE UP (ref 3.5–5.3)
PROT SERPL-MCNC: 7.2 G/DL — SIGNIFICANT CHANGE UP (ref 6–8.3)
PROT SERPL-MCNC: 7.2 G/DL — SIGNIFICANT CHANGE UP (ref 6–8.3)
RBC # BLD: 3.01 M/UL — LOW (ref 3.8–5.2)
RBC # BLD: 3.01 M/UL — LOW (ref 3.8–5.2)
RBC # FLD: 14.3 % — SIGNIFICANT CHANGE UP (ref 10.3–14.5)
RBC # FLD: 14.3 % — SIGNIFICANT CHANGE UP (ref 10.3–14.5)
S AUREUS DNA NOSE QL NAA+PROBE: SIGNIFICANT CHANGE UP
SODIUM SERPL-SCNC: 136 MMOL/L — SIGNIFICANT CHANGE UP (ref 135–145)
SODIUM SERPL-SCNC: 136 MMOL/L — SIGNIFICANT CHANGE UP (ref 135–145)
WBC # BLD: 10.04 K/UL — SIGNIFICANT CHANGE UP (ref 3.8–10.5)
WBC # BLD: 10.04 K/UL — SIGNIFICANT CHANGE UP (ref 3.8–10.5)
WBC # FLD AUTO: 10.04 K/UL — SIGNIFICANT CHANGE UP (ref 3.8–10.5)
WBC # FLD AUTO: 10.04 K/UL — SIGNIFICANT CHANGE UP (ref 3.8–10.5)

## 2024-01-15 PROCEDURE — 0225U NFCT DS DNA&RNA 21 SARSCOV2: CPT

## 2024-01-15 PROCEDURE — 85027 COMPLETE CBC AUTOMATED: CPT

## 2024-01-15 PROCEDURE — 74177 CT ABD & PELVIS W/CONTRAST: CPT | Mod: MA

## 2024-01-15 PROCEDURE — 83605 ASSAY OF LACTIC ACID: CPT

## 2024-01-15 PROCEDURE — 87040 BLOOD CULTURE FOR BACTERIA: CPT

## 2024-01-15 PROCEDURE — 82010 KETONE BODYS QUAN: CPT

## 2024-01-15 PROCEDURE — 93005 ELECTROCARDIOGRAM TRACING: CPT

## 2024-01-15 PROCEDURE — 87640 STAPH A DNA AMP PROBE: CPT

## 2024-01-15 PROCEDURE — 86850 RBC ANTIBODY SCREEN: CPT

## 2024-01-15 PROCEDURE — 83036 HEMOGLOBIN GLYCOSYLATED A1C: CPT

## 2024-01-15 PROCEDURE — 86480 TB TEST CELL IMMUN MEASURE: CPT

## 2024-01-15 PROCEDURE — 82962 GLUCOSE BLOOD TEST: CPT

## 2024-01-15 PROCEDURE — 87449 NOS EACH ORGANISM AG IA: CPT

## 2024-01-15 PROCEDURE — 86922 COMPATIBILITY TEST ANTIGLOB: CPT

## 2024-01-15 PROCEDURE — 86870 RBC ANTIBODY IDENTIFICATION: CPT

## 2024-01-15 PROCEDURE — P9016: CPT

## 2024-01-15 PROCEDURE — 85025 COMPLETE CBC W/AUTO DIFF WBC: CPT

## 2024-01-15 PROCEDURE — 85610 PROTHROMBIN TIME: CPT

## 2024-01-15 PROCEDURE — 82272 OCCULT BLD FECES 1-3 TESTS: CPT

## 2024-01-15 PROCEDURE — 82803 BLOOD GASES ANY COMBINATION: CPT

## 2024-01-15 PROCEDURE — 36430 TRANSFUSION BLD/BLD COMPNT: CPT

## 2024-01-15 PROCEDURE — 94640 AIRWAY INHALATION TREATMENT: CPT

## 2024-01-15 PROCEDURE — 87086 URINE CULTURE/COLONY COUNT: CPT

## 2024-01-15 PROCEDURE — 86901 BLOOD TYPING SEROLOGIC RH(D): CPT

## 2024-01-15 PROCEDURE — 86900 BLOOD TYPING SEROLOGIC ABO: CPT

## 2024-01-15 PROCEDURE — 86140 C-REACTIVE PROTEIN: CPT

## 2024-01-15 PROCEDURE — 71260 CT THORAX DX C+: CPT | Mod: MA

## 2024-01-15 PROCEDURE — 81001 URINALYSIS AUTO W/SCOPE: CPT

## 2024-01-15 PROCEDURE — 84443 ASSAY THYROID STIM HORMONE: CPT

## 2024-01-15 PROCEDURE — 36415 COLL VENOUS BLD VENIPUNCTURE: CPT

## 2024-01-15 PROCEDURE — 87637 SARSCOV2&INF A&B&RSV AMP PRB: CPT

## 2024-01-15 PROCEDURE — 87641 MR-STAPH DNA AMP PROBE: CPT

## 2024-01-15 PROCEDURE — 80053 COMPREHEN METABOLIC PANEL: CPT

## 2024-01-15 PROCEDURE — 87899 AGENT NOS ASSAY W/OPTIC: CPT

## 2024-01-15 PROCEDURE — 99285 EMERGENCY DEPT VISIT HI MDM: CPT | Mod: 25

## 2024-01-15 PROCEDURE — 86880 COOMBS TEST DIRECT: CPT

## 2024-01-15 PROCEDURE — 85730 THROMBOPLASTIN TIME PARTIAL: CPT

## 2024-01-15 PROCEDURE — 71045 X-RAY EXAM CHEST 1 VIEW: CPT

## 2024-01-15 PROCEDURE — 94760 N-INVAS EAR/PLS OXIMETRY 1: CPT

## 2024-01-15 PROCEDURE — 96374 THER/PROPH/DIAG INJ IV PUSH: CPT

## 2024-01-15 RX ADMIN — HEPARIN SODIUM 5000 UNIT(S): 5000 INJECTION INTRAVENOUS; SUBCUTANEOUS at 06:39

## 2024-01-15 RX ADMIN — Medication 4 UNIT(S): at 17:29

## 2024-01-15 RX ADMIN — Medication 3 MILLILITER(S): at 13:50

## 2024-01-15 RX ADMIN — Medication 4: at 17:30

## 2024-01-15 RX ADMIN — HEPARIN SODIUM 5000 UNIT(S): 5000 INJECTION INTRAVENOUS; SUBCUTANEOUS at 14:53

## 2024-01-15 RX ADMIN — Medication 4 UNIT(S): at 08:14

## 2024-01-15 RX ADMIN — INSULIN GLARGINE 12 UNIT(S): 100 INJECTION, SOLUTION SUBCUTANEOUS at 08:15

## 2024-01-15 RX ADMIN — Medication 2: at 08:14

## 2024-01-15 RX ADMIN — Medication 3 MILLILITER(S): at 07:36

## 2024-01-15 NOTE — DISCHARGE NOTE NURSING/CASE MANAGEMENT/SOCIAL WORK - PATIENT PORTAL LINK FT
You can access the FollowMyHealth Patient Portal offered by [facility] by registering at the following website: [email or web address]. By joining [employer]’s FollowMyHealth portal, you will also be able to view your health information using other applications (apps) compatible with our system. You can access the FollowMyHealth Patient Portal offered by [facility] by registering at the following website: [email or web address]. By joining [employer]’s FollowMyHealth portal, you will also be able to view your health information using other applications (apps) compatible with our system. You can access the FollowMyHealth Patient Portal offered by [facility] by registering at the following website: [email or web address]. By joining [employer]’s FollowMyHealth portal, you will also be able to view your health information using other applications (apps) compatible with our system.

## 2024-01-15 NOTE — DISCHARGE NOTE NURSING/CASE MANAGEMENT/SOCIAL WORK - CAREGIVER ADDRESS
[address] [facility] [address] 15 [facility] [address] 15 [facility] [id number]

## 2024-01-15 NOTE — PROVIDER CONTACT NOTE (HYPOGLYCEMIA EVENT) - NS PROVIDER CONTACT BACKGROUND-HYPO
Age: 83y    Gender: Female    POCT Blood Glucose:  140 mg/dL (01-15-24 @ 11:59)  55 mg/dL (01-15-24 @ 11:27)  51 mg/dL (01-15-24 @ 11:25)  156 mg/dL (01-15-24 @ 07:46)  212 mg/dL (01-14-24 @ 21:54)  87 mg/dL (01-14-24 @ 16:49)      eMAR:  insulin glargine Injectable (LANTUS)   12 Unit(s) SubCutaneous (01-15-24 @ 08:15)    insulin lispro (ADMELOG) corrective regimen sliding scale   3 Unit(s) SubCutaneous (01-14-24 @ 12:14)    insulin lispro (ADMELOG) corrective regimen sliding scale   2 Unit(s) SubCutaneous (01-15-24 @ 08:14)    insulin lispro Injectable (ADMELOG)   4 Unit(s) SubCutaneous (01-15-24 @ 08:14)   4 Unit(s) SubCutaneous (01-14-24 @ 12:13)

## 2024-01-15 NOTE — PROGRESS NOTE ADULT - ASSESSMENT
83-year-old female PMH of DM2, anxiety/depression, chronic joint and back pain, osteoarthritis, GERD, anemia, ex-smoker dementia, presents to the emergency department with son sent by urgent care by ambulance with report of pneumonia.    pna  necrotizing pna?  anxiety  depression  OP  OA  DM  GERD  Anemia  Dementia  Ex smoker    on remeron  Strep Pna Ag positive    ID eval  CT chest reviewed  PNA eval - necrotizing pna reported on CT  biomarkers  cx -   sputum -   monitor VS and HD and Sat  legionella - strep Ag -   discussion about TB risk  cough rx regimen  bronchodilators for cough assist and mucociliary clearance  goal sat > 88 pct  Pall Eval - GOC discussion

## 2024-01-15 NOTE — DISCHARGE NOTE NURSING/CASE MANAGEMENT/SOCIAL WORK - NSSCCONTNUM_GEN_ALL_CORE
[facility] Home care agency [phone] or [phone] will reach out to you within 24-72 hours of your discharge to schedule home care visit/eval appointment with you. Please call agency for any queries regarding home care services   [facility] Home care agency [phone] or [phone] will reach out to you within 24-72 hours of your discharge to schedule home care visit/eval appointment with you. Please call agency for any queries regarding home care services   [facility] Home care agency [phone] or [phone] will reach out to you within 24-72 hours of your discharge to schedule home care visit/eval appointment with you. Please call agency for any queries regarding home care services

## 2024-01-15 NOTE — DISCHARGE NOTE NURSING/CASE MANAGEMENT/SOCIAL WORK - CASE MANAGER'S NAME
Case Management office  [phone]- Margo Jackson RN CM   Case Management office  [phone]- Margo Jackson RN CM   Case Management office  [phone]- Margo Jackson RN CM

## 2024-01-15 NOTE — PROGRESS NOTE ADULT - SUBJECTIVE AND OBJECTIVE BOX
[facility]  Mitchell Cantrell PA-C  [address] [place] [address]  [phone]      INTERVAL HPI/OVERNIGHT EVENTS:  Pt s/e  Hgb stable  No new GI events  No overt GI bleeding    MEDICATIONS  (STANDING):  albuterol/ipratropium for Nebulization 3 milliLiter(s) Nebulizer every 6 hours  dextrose 5%. 1000 milliLiter(s) (50 mL/Hr) IV Continuous <Continuous>  dextrose 5%. 1000 milliLiter(s) (100 mL/Hr) IV Continuous <Continuous>  dextrose 50% Injectable 25 Gram(s) IV Push once  dextrose 50% Injectable 12.5 Gram(s) IV Push once  dextrose 50% Injectable 25 Gram(s) IV Push once  glucagon  Injectable 1 milliGRAM(s) IntraMuscular once  heparin   Injectable 5000 Unit(s) SubCutaneous every 8 hours  influenza  Vaccine (HIGH DOSE) 0.7 milliLiter(s) IntraMuscular once  insulin glargine Injectable (LANTUS) 12 Unit(s) SubCutaneous every morning  insulin lispro (ADMELOG) corrective regimen sliding scale   SubCutaneous three times a day before meals  insulin lispro (ADMELOG) corrective regimen sliding scale   SubCutaneous at bedtime  insulin lispro Injectable (ADMELOG) 4 Unit(s) SubCutaneous three times a day before meals  mirtazapine 15 milliGRAM(s) Oral at bedtime  QUEtiapine 25 milliGRAM(s) Oral at bedtime    MEDICATIONS  (PRN):  acetaminophen     Tablet .. 650 milliGRAM(s) Oral every 6 hours PRN Temp greater or equal to 38C (100.4F), Mild Pain (1 - 3)  dextrose Oral Gel 15 Gram(s) Oral once PRN Blood Glucose LESS THAN 70 milliGRAM(s)/deciliter  guaiFENesin Oral Liquid (Sugar-Free) 200 milliGRAM(s) Oral every 6 hours PRN Cough      Allergies    No Known Allergies      PHYSICAL EXAM:   Vital Signs:  Vital Signs Last 24 Hrs  T(C): 36.9 (15 Jarvis 2024 05:14), Max: 37.3 (14 Jan 2024 21:20)  T(F): 98.4 (15 Jarvis 2024 05:14), Max: 99.2 (14 Jan 2024 21:20)  HR: 103 (15 Jarvis 2024 07:46) (78 - 105)  BP: 145/78 (15 Jarvis 2024 05:14) (145/78 - 150/80)  BP(mean): --  RR: 18 (15 Jarvis 2024 05:14) (18 - 18)  SpO2: 98% (15 Jarvis 2024 07:46) (93% - 100%)    Parameters below as of 15 Jarvis 2024 07:46  Patient On (Oxygen Delivery Method): room air      GENERAL:  Appears stated age  HEENT:  NC/AT  CHEST:  Full & symmetric excursion  HEART:  Regular rhythm  ABDOMEN:  Soft, non-tender, non-distended  EXTEREMITIES:  no cyanosis  SKIN:  No rash  NEURO:  Alert      LABS:                        8.7    10.04 )-----------( 607      ( 15 Jarvis 2024 06:08 )             26.5     01-15    136  |  106  |  8   ----------------------------<  156<H>  4.1   |  26  |  0.72    Ca    9.0      15 Jarvis 2024 06:08    TPro  7.2  /  Alb  2.4<L>  /  TBili  0.2  /  DBili  x   /  AST  36  /  ALT  23  /  AlkPhos  100  01-15      Urinalysis Basic - ( 15 Jarvis 2024 06:08 )    Color: x / Appearance: x / SG: x / pH: x  Gluc: 156 mg/dL / Ketone: x  / Bili: x / Urobili: x   Blood: x / Protein: x / Nitrite: x   Leuk Esterase: x / RBC: x / WBC x   Sq Epi: x / Non Sq Epi: x / Bacteria: x     [facility]  Mitchell Cantrell PA-C  [address] [place] [address]  [phone]      INTERVAL HPI/OVERNIGHT EVENTS:  Pt s/e  Hgb stable  No new GI events  No overt GI bleeding    MEDICATIONS  (STANDING):  albuterol/ipratropium for Nebulization 3 milliLiter(s) Nebulizer every 6 hours  dextrose 5%. 1000 milliLiter(s) (50 mL/Hr) IV Continuous <Continuous>  dextrose 5%. 1000 milliLiter(s) (100 mL/Hr) IV Continuous <Continuous>  dextrose 50% Injectable 25 Gram(s) IV Push once  dextrose 50% Injectable 12.5 Gram(s) IV Push once  dextrose 50% Injectable 25 Gram(s) IV Push once  glucagon  Injectable 1 milliGRAM(s) IntraMuscular once  heparin   Injectable 5000 Unit(s) SubCutaneous every 8 hours  influenza  Vaccine (HIGH DOSE) 0.7 milliLiter(s) IntraMuscular once  insulin glargine Injectable (LANTUS) 12 Unit(s) SubCutaneous every morning  insulin lispro (ADMELOG) corrective regimen sliding scale   SubCutaneous three times a day before meals  insulin lispro (ADMELOG) corrective regimen sliding scale   SubCutaneous at bedtime  insulin lispro Injectable (ADMELOG) 4 Unit(s) SubCutaneous three times a day before meals  mirtazapine 15 milliGRAM(s) Oral at bedtime  QUEtiapine 25 milliGRAM(s) Oral at bedtime    MEDICATIONS  (PRN):  acetaminophen     Tablet .. 650 milliGRAM(s) Oral every 6 hours PRN Temp greater or equal to 38C (100.4F), Mild Pain (1 - 3)  dextrose Oral Gel 15 Gram(s) Oral once PRN Blood Glucose LESS THAN 70 milliGRAM(s)/deciliter  guaiFENesin Oral Liquid (Sugar-Free) 200 milliGRAM(s) Oral every 6 hours PRN Cough      Allergies    No Known Allergies      PHYSICAL EXAM:   Vital Signs:  Vital Signs Last 24 Hrs  T(C): 36.9 (15 Jarvis 2024 05:14), Max: 37.3 (14 Jan 2024 21:20)  T(F): 98.4 (15 Jarvis 2024 05:14), Max: 99.2 (14 Jan 2024 21:20)  HR: 103 (15 Jarvis 2024 07:46) (78 - 105)  BP: 145/78 (15 Jarvis 2024 05:14) (145/78 - 150/80)  BP(mean): --  RR: 18 (15 Jarvis 2024 05:14) (18 - 18)  SpO2: 98% (15 Jarvis 2024 07:46) (93% - 100%)    Parameters below as of 15 Jarvis 2024 07:46  Patient On (Oxygen Delivery Method): room air      GENERAL:  Appears stated age  HEENT:  NC/AT  CHEST:  Full & symmetric excursion  HEART:  Regular rhythm  ABDOMEN:  Soft, non-tender, non-distended  EXTEREMITIES:  no cyanosis  SKIN:  No rash  NEURO:  Alert      LABS:                        8.7    10.04 )-----------( 607      ( 15 Jarvis 2024 06:08 )             26.5     01-15    136  |  106  |  8   ----------------------------<  156<H>  4.1   |  26  |  0.72    Ca    9.0      15 Jarvis 2024 06:08    TPro  7.2  /  Alb  2.4<L>  /  TBili  0.2  /  DBili  x   /  AST  36  /  ALT  23  /  AlkPhos  100  01-15      Urinalysis Basic - ( 15 Jarvis 2024 06:08 )    Color: x / Appearance: x / SG: x / pH: x  Gluc: 156 mg/dL / Ketone: x  / Bili: x / Urobili: x   Blood: x / Protein: x / Nitrite: x   Leuk Esterase: x / RBC: x / WBC x   Sq Epi: x / Non Sq Epi: x / Bacteria: x     [facility]  Mitchell Cantrell PA-C  [address] [place] [address]  [phone]      INTERVAL HPI/OVERNIGHT EVENTS:  Pt s/e  Hgb stable  No new GI events  No overt GI bleeding    MEDICATIONS  (STANDING):  albuterol/ipratropium for Nebulization 3 milliLiter(s) Nebulizer every 6 hours  dextrose 5%. 1000 milliLiter(s) (50 mL/Hr) IV Continuous <Continuous>  dextrose 5%. 1000 milliLiter(s) (100 mL/Hr) IV Continuous <Continuous>  dextrose 50% Injectable 25 Gram(s) IV Push once  dextrose 50% Injectable 12.5 Gram(s) IV Push once  dextrose 50% Injectable 25 Gram(s) IV Push once  glucagon  Injectable 1 milliGRAM(s) IntraMuscular once  heparin   Injectable 5000 Unit(s) SubCutaneous every 8 hours  influenza  Vaccine (HIGH DOSE) 0.7 milliLiter(s) IntraMuscular once  insulin glargine Injectable (LANTUS) 12 Unit(s) SubCutaneous every morning  insulin lispro (ADMELOG) corrective regimen sliding scale   SubCutaneous three times a day before meals  insulin lispro (ADMELOG) corrective regimen sliding scale   SubCutaneous at bedtime  insulin lispro Injectable (ADMELOG) 4 Unit(s) SubCutaneous three times a day before meals  mirtazapine 15 milliGRAM(s) Oral at bedtime  QUEtiapine 25 milliGRAM(s) Oral at bedtime    MEDICATIONS  (PRN):  acetaminophen     Tablet .. 650 milliGRAM(s) Oral every 6 hours PRN Temp greater or equal to 38C (100.4F), Mild Pain (1 - 3)  dextrose Oral Gel 15 Gram(s) Oral once PRN Blood Glucose LESS THAN 70 milliGRAM(s)/deciliter  guaiFENesin Oral Liquid (Sugar-Free) 200 milliGRAM(s) Oral every 6 hours PRN Cough      Allergies    No Known Allergies      PHYSICAL EXAM:   Vital Signs:  Vital Signs Last 24 Hrs  T(C): 36.9 (15 Jarvis 2024 05:14), Max: 37.3 (14 Jan 2024 21:20)  T(F): 98.4 (15 Jarvis 2024 05:14), Max: 99.2 (14 Jan 2024 21:20)  HR: 103 (15 Jarvis 2024 07:46) (78 - 105)  BP: 145/78 (15 Jarvis 2024 05:14) (145/78 - 150/80)  BP(mean): --  RR: 18 (15 Jarvis 2024 05:14) (18 - 18)  SpO2: 98% (15 Jarvis 2024 07:46) (93% - 100%)    Parameters below as of 15 Jarvis 2024 07:46  Patient On (Oxygen Delivery Method): room air      GENERAL:  Appears stated age  HEENT:  NC/AT  CHEST:  Full & symmetric excursion  HEART:  Regular rhythm  ABDOMEN:  Soft, non-tender, non-distended  EXTEREMITIES:  no cyanosis  SKIN:  No rash  NEURO:  Alert      LABS:                        8.7    10.04 )-----------( 607      ( 15 Jarvis 2024 06:08 )             26.5     01-15    136  |  106  |  8   ----------------------------<  156<H>  4.1   |  26  |  0.72    Ca    9.0      15 Jarvis 2024 06:08    TPro  7.2  /  Alb  2.4<L>  /  TBili  0.2  /  DBili  x   /  AST  36  /  ALT  23  /  AlkPhos  100  01-15      Urinalysis Basic - ( 15 Jarvis 2024 06:08 )    Color: x / Appearance: x / SG: x / pH: x  Gluc: 156 mg/dL / Ketone: x  / Bili: x / Urobili: x   Blood: x / Protein: x / Nitrite: x   Leuk Esterase: x / RBC: x / WBC x   Sq Epi: x / Non Sq Epi: x / Bacteria: x

## 2024-01-15 NOTE — DISCHARGE NOTE NURSING/CASE MANAGEMENT/SOCIAL WORK - NSDCPEFALRISK_GEN_ALL_CORE
For information on Fall & Injury Prevention, visit: [email or web address] OR  [email or web address] OR  https://www.cdc.gov/steadi/patient.html For information on Fall & Injury Prevention, visit: [email or web address] OR  [email or web address]-[email or web address]-[email or web address]-injury OR  https://www.cdc.gov/steadi/patient.html For information on Fall & Injury Prevention, visit: [email or web address]://[email or web address] OR  [email or web address]-injury OR  https://www.cdc.gov/steadi/patient.html

## 2024-01-15 NOTE — CASE MANAGEMENT PROGRESS NOTE - NSCMPROGRESSNOTE_GEN_ALL_CORE
CM awaiting DC summary to be updated and called son x2 for MD and to review POC. [facility] awaiting orders to accept CM awaiting DC summary to be updated and called son x2 for MD and to review POC. [facility] awaiting orders to accept CM awaiting DC summary to be updated and called son x2 for MD and to review POC. [facility] awaiting orders to accept

## 2024-01-15 NOTE — CASE MANAGEMENT PROGRESS NOTE - NSCMPROGRESSNOTE_GEN_ALL_CORE
Per MD, patient is medically cleared for discharge home today.  [facility] accepted case.  CM remains available throughout hospital stay.

## 2024-01-15 NOTE — PROGRESS NOTE ADULT - PROVIDER SPECIALTY LIST ADULT
Gastroenterology
Gastroenterology
Hospitalist
Cardiology
Cardiology
Endocrinology
Hospitalist
Cardiology
Cardiology
Gastroenterology
Gastroenterology
Hospitalist
Infectious Disease
Pulmonology
Gastroenterology
Hospitalist
Hospitalist
Pulmonology
Infectious Disease
Pulmonology
Cardiology
Gastroenterology
Infectious Disease
Infectious Disease
Pulmonology

## 2024-01-15 NOTE — CASE MANAGEMENT PROGRESS NOTE - NSCMPROGRESSNOTE_GEN_ALL_CORE
CM spoke with MD re transition to home POC. CM stated would refer patient for RN, [facility] referral initiated and sent. CM called son, left msg. NO PCP noted on documents, will inquire

## 2024-01-15 NOTE — PROGRESS NOTE ADULT - SUBJECTIVE AND OBJECTIVE BOX
[facility], Division of Infectious Diseases  GERDA Wiley Y. Patel, S. Shah, G. [facility]  [phone]    Name: FRANKI VALVERDE  Age: 83y  Gender: Female  MRN: [id number]    Interval History:  Patient seen and examined at bedside   No acute overnight events. Afebrile  No complaints  Notes reviewed    Antibiotics:      Medications:  acetaminophen     Tablet .. 650 milliGRAM(s) Oral every 6 hours PRN  albuterol/ipratropium for Nebulization 3 milliLiter(s) Nebulizer every 6 hours  dextrose 5%. 1000 milliLiter(s) IV Continuous <Continuous>  dextrose 5%. 1000 milliLiter(s) IV Continuous <Continuous>  dextrose 50% Injectable 12.5 Gram(s) IV Push once  dextrose 50% Injectable 25 Gram(s) IV Push once  dextrose 50% Injectable 25 Gram(s) IV Push once  dextrose Oral Gel 15 Gram(s) Oral once PRN  glucagon  Injectable 1 milliGRAM(s) IntraMuscular once  guaiFENesin Oral Liquid (Sugar-Free) 200 milliGRAM(s) Oral every 6 hours PRN  heparin   Injectable 5000 Unit(s) SubCutaneous every 8 hours  influenza  Vaccine (HIGH DOSE) 0.7 milliLiter(s) IntraMuscular once  insulin glargine Injectable (LANTUS) 12 Unit(s) SubCutaneous every morning  insulin lispro (ADMELOG) corrective regimen sliding scale   SubCutaneous three times a day before meals  insulin lispro (ADMELOG) corrective regimen sliding scale   SubCutaneous at bedtime  insulin lispro Injectable (ADMELOG) 4 Unit(s) SubCutaneous three times a day before meals  mirtazapine 15 milliGRAM(s) Oral at bedtime  QUEtiapine 25 milliGRAM(s) Oral at bedtime      ROS unable to obtain  Allergies: No Known Allergies    For details regarding the patient's past medical history, social history, family history, and other miscellaneous elements, please refer the initial infectious diseases consultation and/or the admitting history and physical examination for this admission.    Objective:  Vitals:   T(C): 36.8 (01-15-24 @ 12:46), Max: 37.3 (01-14-24 @ 21:20)  HR: 92 (01-15-24 @ 12:46) (92 - 105)  BP: 119/73 (01-15-24 @ 12:46) (119/73 - 150/80)  RR: 18 (01-15-24 @ 12:46) (18 - 18)  SpO2: 97% (01-15-24 @ 13:50) (93% - 100%)    Physical Examination:  General: no acute distress  HEENT: NC/AT, EOMI  Cardio: RRR  Resp: decreased b/l breath sounds  Abd: soft, NT, ND, + bowel sounds  Neuro: no obvious focal deficits  Ext: no edema or cyanosis  Skin: warm, dry, no visible rash      Laboratory Studies:  CBC:                       8.7    10.04 )-----------( 607      ( 15 Jarvis 2024 06:08 )             26.5     CMP: 01-15    136  |  106  |  8   ----------------------------<  156<H>  4.1   |  26  |  0.72    Ca    9.0      15 Jarvis 2024 06:08    TPro  7.2  /  Alb  2.4<L>  /  TBili  0.2  /  DBili  x   /  AST  36  /  ALT  23  /  AlkPhos  100  01-15    LIVER FUNCTIONS - ( 15 Jarvis 2024 06:08 )  Alb: 2.4 g/dL / Pro: 7.2 g/dL / ALK PHOS: 100 U/L / ALT: 23 U/L / AST: 36 U/L / GGT: x           Urinalysis Basic - ( 15 Jarvis 2024 06:08 )    Color: x / Appearance: x / SG: x / pH: x  Gluc: 156 mg/dL / Ketone: x  / Bili: x / Urobili: x   Blood: x / Protein: x / Nitrite: x   Leuk Esterase: x / RBC: x / WBC x   Sq Epi: x / Non Sq Epi: x / Bacteria: x        Microbiology: reviewed    Culture - Urine (collected 01-10-24 @ 06:44)  Source: Catheterized Catheterized  Final Report (01-11-24 @ 14:27):    <10,000 CFU/mL Normal Urogenital Nohemy    Culture - Blood (collected 01-08-24 @ 22:05)  Source: .Blood Blood-Peripheral  Final Report (01-14-24 @ 02:01):    No growth at 5 days    Culture - Blood (collected 01-08-24 @ 21:55)  Source: .Blood Blood-Peripheral  Final Report (01-14-24 @ 02:01):    No growth at 5 days          Radiology: reviewed       Cyndi, Division of Infectious Diseases  GERDA Wiley Y. Patel, S. Shah, G. [facility]  [phone]    Name: FRANKI VALVERDE  Age: 83y  Gender: Female  MRN: [id number]    Interval History:  Patient seen and examined at bedside   No acute overnight events. Afebrile  No complaints  Notes reviewed    Antibiotics:      Medications:  acetaminophen     Tablet .. 650 milliGRAM(s) Oral every 6 hours PRN  albuterol/ipratropium for Nebulization 3 milliLiter(s) Nebulizer every 6 hours  dextrose 5%. 1000 milliLiter(s) IV Continuous <Continuous>  dextrose 5%. 1000 milliLiter(s) IV Continuous <Continuous>  dextrose 50% Injectable 12.5 Gram(s) IV Push once  dextrose 50% Injectable 25 Gram(s) IV Push once  dextrose 50% Injectable 25 Gram(s) IV Push once  dextrose Oral Gel 15 Gram(s) Oral once PRN  glucagon  Injectable 1 milliGRAM(s) IntraMuscular once  guaiFENesin Oral Liquid (Sugar-Free) 200 milliGRAM(s) Oral every 6 hours PRN  heparin   Injectable 5000 Unit(s) SubCutaneous every 8 hours  influenza  Vaccine (HIGH DOSE) 0.7 milliLiter(s) IntraMuscular once  insulin glargine Injectable (LANTUS) 12 Unit(s) SubCutaneous every morning  insulin lispro (ADMELOG) corrective regimen sliding scale   SubCutaneous three times a day before meals  insulin lispro (ADMELOG) corrective regimen sliding scale   SubCutaneous at bedtime  insulin lispro Injectable (ADMELOG) 4 Unit(s) SubCutaneous three times a day before meals  mirtazapine 15 milliGRAM(s) Oral at bedtime  QUEtiapine 25 milliGRAM(s) Oral at bedtime      ROS unable to obtain  Allergies: No Known Allergies    For details regarding the patient's past medical history, social history, family history, and other miscellaneous elements, please refer the initial infectious diseases consultation and/or the admitting history and physical examination for this admission.    Objective:  Vitals:   T(C): 36.8 (01-15-24 @ 12:46), Max: 37.3 (01-14-24 @ 21:20)  HR: 92 (01-15-24 @ 12:46) (92 - 105)  BP: 119/73 (01-15-24 @ 12:46) (119/73 - 150/80)  RR: 18 (01-15-24 @ 12:46) (18 - 18)  SpO2: 97% (01-15-24 @ 13:50) (93% - 100%)    Physical Examination:  General: no acute distress  HEENT: NC/AT, EOMI  Cardio: RRR  Resp: decreased b/l breath sounds  Abd: soft, NT, ND, + bowel sounds  Neuro: no obvious focal deficits  Ext: no edema or cyanosis  Skin: warm, dry, no visible rash      Laboratory Studies:  CBC:                       8.7    10.04 )-----------( 607      ( 15 Jarvis 2024 06:08 )             26.5     CMP: 01-15    136  |  106  |  8   ----------------------------<  156<H>  4.1   |  26  |  0.72    Ca    9.0      15 Jarvis 2024 06:08    TPro  7.2  /  Alb  2.4<L>  /  TBili  0.2  /  DBili  x   /  AST  36  /  ALT  23  /  AlkPhos  100  01-15    LIVER FUNCTIONS - ( 15 Jarvis 2024 06:08 )  Alb: 2.4 g/dL / Pro: 7.2 g/dL / ALK PHOS: 100 U/L / ALT: 23 U/L / AST: 36 U/L / GGT: x           Urinalysis Basic - ( 15 Jarvis 2024 06:08 )    Color: x / Appearance: x / SG: x / pH: x  Gluc: 156 mg/dL / Ketone: x  / Bili: x / Urobili: x   Blood: x / Protein: x / Nitrite: x   Leuk Esterase: x / RBC: x / WBC x   Sq Epi: x / Non Sq Epi: x / Bacteria: x        Microbiology: reviewed    Culture - Urine (collected 01-10-24 @ 06:44)  Source: Catheterized Catheterized  Final Report (01-11-24 @ 14:27):    <10,000 CFU/mL Normal Urogenital Nohemy    Culture - Blood (collected 01-08-24 @ 22:05)  Source: .Blood Blood-Peripheral  Final Report (01-14-24 @ 02:01):    No growth at 5 days    Culture - Blood (collected 01-08-24 @ 21:55)  Source: .Blood Blood-Peripheral  Final Report (01-14-24 @ 02:01):    No growth at 5 days          Radiology: reviewed       C[facility], Division of Infectious Diseases  GERDA Wiley Y. Patel, S. Shah, G. [facility]  [phone]    Name: FRANKI VALVERDE  Age: 83y  Gender: Female  MRN: [id number]    Interval History:  Patient seen and examined at bedside   No acute overnight events. Afebrile  No complaints  Notes reviewed    Antibiotics:      Medications:  acetaminophen     Tablet .. 650 milliGRAM(s) Oral every 6 hours PRN  albuterol/ipratropium for Nebulization 3 milliLiter(s) Nebulizer every 6 hours  dextrose 5%. 1000 milliLiter(s) IV Continuous <Continuous>  dextrose 5%. 1000 milliLiter(s) IV Continuous <Continuous>  dextrose 50% Injectable 12.5 Gram(s) IV Push once  dextrose 50% Injectable 25 Gram(s) IV Push once  dextrose 50% Injectable 25 Gram(s) IV Push once  dextrose Oral Gel 15 Gram(s) Oral once PRN  glucagon  Injectable 1 milliGRAM(s) IntraMuscular once  guaiFENesin Oral Liquid (Sugar-Free) 200 milliGRAM(s) Oral every 6 hours PRN  heparin   Injectable 5000 Unit(s) SubCutaneous every 8 hours  influenza  Vaccine (HIGH DOSE) 0.7 milliLiter(s) IntraMuscular once  insulin glargine Injectable (LANTUS) 12 Unit(s) SubCutaneous every morning  insulin lispro (ADMELOG) corrective regimen sliding scale   SubCutaneous three times a day before meals  insulin lispro (ADMELOG) corrective regimen sliding scale   SubCutaneous at bedtime  insulin lispro Injectable (ADMELOG) 4 Unit(s) SubCutaneous three times a day before meals  mirtazapine 15 milliGRAM(s) Oral at bedtime  QUEtiapine 25 milliGRAM(s) Oral at bedtime      ROS unable to obtain  Allergies: No Known Allergies    For details regarding the patient's past medical history, social history, family history, and other miscellaneous elements, please refer the initial infectious diseases consultation and/or the admitting history and physical examination for this admission.    Objective:  Vitals:   T(C): 36.8 (01-15-24 @ 12:46), Max: 37.3 (01-14-24 @ 21:20)  HR: 92 (01-15-24 @ 12:46) (92 - 105)  BP: 119/73 (01-15-24 @ 12:46) (119/73 - 150/80)  RR: 18 (01-15-24 @ 12:46) (18 - 18)  SpO2: 97% (01-15-24 @ 13:50) (93% - 100%)    Physical Examination:  General: no acute distress  HEENT: NC/AT, EOMI  Cardio: RRR  Resp: decreased b/l breath sounds  Abd: soft, NT, ND, + bowel sounds  Neuro: no obvious focal deficits  Ext: no edema or cyanosis  Skin: warm, dry, no visible rash      Laboratory Studies:  CBC:                       8.7    10.04 )-----------( 607      ( 15 Jarvis 2024 06:08 )             26.5     CMP: 01-15    136  |  106  |  8   ----------------------------<  156<H>  4.1   |  26  |  0.72    Ca    9.0      15 Jarvis 2024 06:08    TPro  7.2  /  Alb  2.4<L>  /  TBili  0.2  /  DBili  x   /  AST  36  /  ALT  23  /  AlkPhos  100  01-15    LIVER FUNCTIONS - ( 15 Jarvis 2024 06:08 )  Alb: 2.4 g/dL / Pro: 7.2 g/dL / ALK PHOS: 100 U/L / ALT: 23 U/L / AST: 36 U/L / GGT: x           Urinalysis Basic - ( 15 Jarvis 2024 06:08 )    Color: x / Appearance: x / SG: x / pH: x  Gluc: 156 mg/dL / Ketone: x  / Bili: x / Urobili: x   Blood: x / Protein: x / Nitrite: x   Leuk Esterase: x / RBC: x / WBC x   Sq Epi: x / Non Sq Epi: x / Bacteria: x        Microbiology: reviewed    Culture - Urine (collected 01-10-24 @ 06:44)  Source: Catheterized Catheterized  Final Report (01-11-24 @ 14:27):    <10,000 CFU/mL Normal Urogenital Nohemy    Culture - Blood (collected 01-08-24 @ 22:05)  Source: .Blood Blood-Peripheral  Final Report (01-14-24 @ 02:01):    No growth at 5 days    Culture - Blood (collected 01-08-24 @ 21:55)  Source: .Blood Blood-Peripheral  Final Report (01-14-24 @ 02:01):    No growth at 5 days          Radiology: reviewed

## 2024-01-15 NOTE — PROGRESS NOTE ADULT - ASSESSMENT
83-year-old female PMH of DM2, anxiety/depression, chronic joint and back pain, osteoarthritis, GERD, anemia, exsmoker dementia, presents to the emergency department with family sent by urgent care by ambulance with report of pneumonia.  One week of dry cough, generalized weakness, no appetite, 101 fever, low blood pressure.  Chest CT-Necrotizing right middle lobe pneumonia.    RECOMMENDATIONS  1-PNA concerning with fever, leukocytosis, necrotizing right middle lobe PNA on imaging in woman just returning from [place].   While TB is in the differential, the neg QFG with strong mitogen response, the appearance, lack of adenopathy and acuity suggesting bacterial    Agree with pulmonary involvement and recommend  -escalation to Zosyn 1/9- changed to ceftriaxone 1 gram IV daily on 1/10 as this will only require 30 minutes to deliver-continue for now   1/11-with +strept ag in urine stopped metronidazole as this may all be due to a severe necrotizing strept PNA  1/12 clinically improving but still with leukocytosis and with necrotizing PNA may extend duration past the 5 days  1/15 pt improved, s/p 7 day Abx. Can monitor off Abx    Infectious Diseases will continue to follow. Please call with any questions.   Tammi Zhao M.D.  [facility] Division of Infectious Diseases [phone]  For after 5 P.M. and weekends, please call [phone]       83-year-old female PMH of DM2, anxiety/depression, chronic joint and back pain, osteoarthritis, GERD, anemia, exsmoker dementia, presents to the emergency department with family sent by urgent care by ambulance with report of pneumonia.  One week of dry cough, generalized weakness, no appetite, 101 fever, low blood pressure.  Chest CT-Necrotizing right middle lobe pneumonia.    RECOMMENDATIONS  1-PNA concerning with fever, leukocytosis, necrotizing right middle lobe PNA on imaging in woman just returning from [place].   While TB is in the differential, the neg QFG with strong mitogen response, the appearance, lack of adenopathy and acuity suggesting bacterial    Agree with pulmonary involvement and recommend  -escalation to Zosyn 1/9- changed to ceftriaxone 1 gram IV daily on 1/10 as this will only require 30 minutes to deliver-continue for now   1/11-with +strept ag in urine stopped metronidazole as this may all be due to a severe necrotizing strept PNA  1/12 clinically improving but still with leukocytosis and with necrotizing PNA may extend duration past the 5 days  1/15 pt improved, s/p 7 day Abx. Can monitor off Abx    Infectious Diseases will continue to follow. Please call with any questions.   Tammi Zhao M.D.  [facility] Division of Infectious Diseases [phone]  For after 5 P.M. and weekends, please call [phone]       83-year-old female PMH of DM2, anxiety/depression, chronic joint and back pain, osteoarthritis, GERD, anemia, exsmoker dementia, presents to the emergency department with family sent by urgent care by ambulance with report of pneumonia.  One week of dry cough, generalized weakness, no appetite, 101 fever, low blood pressure.  Chest CT-Necrotizing right middle lobe pneumonia.    RECOMMENDATIONS  1-PNA concerning with fever, leukocytosis, necrotizing right middle lobe PNA on imaging in woman just returning from [place].   While TB is in the differential, the neg QFG with strong mitogen response, the appearance, lack of adenopathy and acuity suggesting bacterial    Agree with pulmonary involvement and recommend  -escalation to Zosyn 1/9- changed to ceftriaxone 1 gram IV daily on 1/10 as this will only require 30 minutes to deliver-continue for now   1/11-with +strept ag in urine stopped metronidazole as this may all be due to a severe necrotizing strept PNA  1/12 clinically improving but still with leukocytosis and with necrotizing PNA may extend duration past the 5 days  1/15 pt improved, s/p 7 day Abx. Can monitor off Abx    Infectious Diseases will continue to follow. Please call with any questions.   Tammi Zhao M.D.  [facility] Division of Infectious Diseases [phone]  For after 5 P.M. and weekends, please call [phone]       83-year-old female PMH of DM2, anxiety/depression, chronic joint and back pain, osteoarthritis, GERD, anemia, exsmoker dementia, presents to the emergency department with family sent by urgent care by ambulance with report of pneumonia.  One week of dry cough, generalized weakness, no appetite, 101 fever, low blood pressure.  Chest CT-Necrotizing right middle lobe pneumonia.  concerning with fever, leukocytosis, necrotizing right middle lobe PNA on imaging in woman just returning from [place].   While TB is in the differential, the neg QFG with strong mitogen response, the appearance, lack of adenopathy and acuity suggesting bacterial     Recommendations:   S/p ceftriaxone/azithromycin --> escalated to Zosyn 1/9 --> changed back  to ceftriaxone 1 gram IV daily on 1/10  1/11 with +strept ag in urine, stopped metronidazole as this may all be due to a severe necrotizing strept PNA  1/12 clinically improving but still with leukocytosis and with necrotizing PNA may extend duration past the 5 days  1/15 pt improved, s/p 7 day Abx. Can monitor off Abx at this time    Stable from ID standpoint  D/c planning per primary team    Please call with any questions.   Tammi Zhao M.D.  [facility] Division of Infectious Diseases [phone]  For after 5 P.M. and weekends, please call [phone]       83-year-old female PMH of DM2, anxiety/depression, chronic joint and back pain, osteoarthritis, GERD, anemia, exsmoker dementia, presents to the emergency department with family sent by urgent care by ambulance with report of pneumonia.  One week of dry cough, generalized weakness, no appetite, 101 fever, low blood pressure.  Chest CT-Necrotizing right middle lobe pneumonia.  concerning with fever, leukocytosis, necrotizing right middle lobe PNA on imaging in woman just returning from [place].   While TB is in the differential, the neg QFG with strong mitogen response, the appearance, lack of adenopathy and acuity suggesting bacterial     Recommendations:   S/p ceftriaxone/azithromycin --> escalated to Zosyn 1/9 --> changed back  to ceftriaxone 1 gram IV daily on 1/10  1/11 with +strept ag in urine, stopped metronidazole as this may all be due to a severe necrotizing strept PNA  1/12 clinically improving but still with leukocytosis and with necrotizing PNA may extend duration past the 5 days  1/15 pt improved, s/p 7 day Abx. Can monitor off Abx at this time    Stable from ID standpoint  D/c planning per primary team    Please call with any questions.   Tammi Zhao M.D.  [facility] of Infectious Diseases [phone]  For after 5 P.M. and weekends, please call [phone]       83-year-old female PMH of DM2, anxiety/depression, chronic joint and back pain, osteoarthritis, GERD, anemia, exsmoker dementia, presents to the emergency department with family sent by urgent care by ambulance with report of pneumonia.  One week of dry cough, generalized weakness, no appetite, 101 fever, low blood pressure.  Chest CT-Necrotizing right middle lobe pneumonia.  concerning with fever, leukocytosis, necrotizing right middle lobe PNA on imaging in woman just returning from [place].   While TB is in the differential, the neg QFG with strong mitogen response, the appearance, lack of adenopathy and acuity suggesting bacterial     Recommendations:   S/p ceftriaxone/azithromycin --> escalated to Zosyn 1/9 --> changed back  to ceftriaxone 1 gram IV daily on 1/10  1/11 with +strept ag in urine, stopped metronidazole as this may all be due to a severe necrotizing strept PNA  1/12 clinically improving but still with leukocytosis and with necrotizing PNA may extend duration past the 5 days  1/15 pt improved, s/p 7 day Abx. Can monitor off Abx at this time    Stable from ID standpoint  D/c planning per primary team    Please call with any questions.   Tammi Zhao M.D.  [facility] of Infectious [facility] [phone]  For after 5 P.M. and weekends, please call [phone]

## 2024-01-15 NOTE — PROGRESS NOTE ADULT - SUBJECTIVE AND OBJECTIVE BOX
Date/Time Patient Seen:  		  Referring MD:   Data Reviewed	       Patient is a 83y old  Female who presents with a chief complaint of fever (14 Jan 2024 15:04)      Subjective/HPI     PAST MEDICAL & SURGICAL HISTORY:  DM (diabetes mellitus)    Type 2 diabetes mellitus    Anxiety    OA (osteoarthritis) of knee  (Right knee)    Anemia    Other chronic pancreatitis    DM (diabetes mellitus)    HLD (hyperlipidemia)    Anxiety and depression    Seasonal allergies    Diabetes mellitus, type 2    GERD (gastroesophageal reflux disease)    Chronic joint pain    S/P arthroscopic knee surgery  (Right knee, 2007)    History of back surgery  (2007)    S/P cholecystectomy  (2002)    S/P subtotal gastrectomy  (with gastrojejunal anastomosis, 30 years ago)    S/P knee replacement          Medication list         MEDICATIONS  (STANDING):  albuterol/ipratropium for Nebulization 3 milliLiter(s) Nebulizer every 6 hours  dextrose 5%. 1000 milliLiter(s) (50 mL/Hr) IV Continuous <Continuous>  dextrose 5%. 1000 milliLiter(s) (100 mL/Hr) IV Continuous <Continuous>  dextrose 50% Injectable 25 Gram(s) IV Push once  dextrose 50% Injectable 12.5 Gram(s) IV Push once  dextrose 50% Injectable 25 Gram(s) IV Push once  glucagon  Injectable 1 milliGRAM(s) IntraMuscular once  heparin   Injectable 5000 Unit(s) SubCutaneous every 8 hours  influenza  Vaccine (HIGH DOSE) 0.7 milliLiter(s) IntraMuscular once  insulin glargine Injectable (LANTUS) 12 Unit(s) SubCutaneous every morning  insulin lispro (ADMELOG) corrective regimen sliding scale   SubCutaneous at bedtime  insulin lispro (ADMELOG) corrective regimen sliding scale   SubCutaneous three times a day before meals  insulin lispro Injectable (ADMELOG) 4 Unit(s) SubCutaneous three times a day before meals  mirtazapine 15 milliGRAM(s) Oral at bedtime  QUEtiapine 25 milliGRAM(s) Oral at bedtime    MEDICATIONS  (PRN):  acetaminophen     Tablet .. 650 milliGRAM(s) Oral every 6 hours PRN Temp greater or equal to 38C (100.4F), Mild Pain (1 - 3)  dextrose Oral Gel 15 Gram(s) Oral once PRN Blood Glucose LESS THAN 70 milliGRAM(s)/deciliter  guaiFENesin Oral Liquid (Sugar-Free) 200 milliGRAM(s) Oral every 6 hours PRN Cough         Vitals log        ICU Vital Signs Last 24 Hrs  T(C): 37.3 (14 Jan 2024 21:20), Max: 37.3 (14 Jan 2024 21:20)  T(F): 99.2 (14 Jan 2024 21:20), Max: 99.2 (14 Jan 2024 21:20)  HR: 104 (14 Jan 2024 21:20) (78 - 104)  BP: 150/80 (14 Jan 2024 21:20) (134/75 - 156/71)  BP(mean): --  ABP: --  ABP(mean): --  RR: 18 (14 Jan 2024 21:20) (18 - 18)  SpO2: 100% (14 Jan 2024 21:20) (94% - 100%)    O2 Parameters below as of 14 Jan 2024 21:20  Patient On (Oxygen Delivery Method): room air                 Input and Output:  I&O's Detail    13 Jan 2024 07:01  -  14 Jan 2024 07:00  --------------------------------------------------------  IN:    IV PiggyBack: 50 mL    sodium chloride 0.9%: 1350 mL  Total IN: 1400 mL    OUT:  Total OUT: 0 mL    Total NET: 1400 mL          Lab Data                        8.0    9.94  )-----------( 502      ( 14 Jan 2024 06:35 )             24.6     01-14    136  |  107  |  9   ----------------------------<  198<H>  4.3   |  27  |  0.77    Ca    8.7      14 Jan 2024 06:35    TPro  6.4  /  Alb  2.1<L>  /  TBili  0.3  /  DBili  x   /  AST  29  /  ALT  18  /  AlkPhos  84  01-14            Review of Systems	      Objective     Physical Examination    heart s1s2  lung dc BS  head nc      Pertinent Lab findings & Imaging      Shanta:  NO   Adequate UO     I&O's Detail    13 Jan 2024 07:01  -  14 Jan 2024 07:00  --------------------------------------------------------  IN:    IV PiggyBack: 50 mL    sodium chloride 0.9%: 1350 mL  Total IN: 1400 mL    OUT:  Total OUT: 0 mL    Total NET: 1400 mL               Discussed with:     Cultures:	        Radiology

## 2024-03-12 NOTE — H&P ADULT - NSICDXPASTSURGICALHX_GEN_ALL_CORE_FT
PAST SURGICAL HISTORY:  History of back surgery (2007)    S/P arthroscopic knee surgery (Right knee, 2007)    S/P cholecystectomy (2002)    S/P knee replacement     S/P subtotal gastrectomy (with gastrojejunal anastomosis, 30 years ago)    
PAST SURGICAL HISTORY:  No significant past surgical history

## 2024-10-10 ENCOUNTER — OUTPATIENT (OUTPATIENT)
Dept: OUTPATIENT SERVICES | Facility: HOSPITAL | Age: 84
LOS: 1 days | End: 2024-10-10
Payer: MEDICAID

## 2024-10-10 ENCOUNTER — APPOINTMENT (OUTPATIENT)
Dept: CT IMAGING | Facility: CLINIC | Age: 84
End: 2024-10-10
Payer: MEDICAID

## 2024-10-10 DIAGNOSIS — Z98.890 OTHER SPECIFIED POSTPROCEDURAL STATES: Chronic | ICD-10-CM

## 2024-10-10 DIAGNOSIS — Z96.659 PRESENCE OF UNSPECIFIED ARTIFICIAL KNEE JOINT: Chronic | ICD-10-CM

## 2024-10-10 DIAGNOSIS — Z90.49 ACQUIRED ABSENCE OF OTHER SPECIFIED PARTS OF DIGESTIVE TRACT: Chronic | ICD-10-CM

## 2024-10-10 DIAGNOSIS — R97.8 OTHER ABNORMAL TUMOR MARKERS: ICD-10-CM

## 2024-10-10 DIAGNOSIS — Z90.3 ACQUIRED ABSENCE OF STOMACH [PART OF]: Chronic | ICD-10-CM

## 2024-10-10 DIAGNOSIS — D61.9 APLASTIC ANEMIA, UNSPECIFIED: ICD-10-CM

## 2024-10-10 PROCEDURE — 74177 CT ABD & PELVIS W/CONTRAST: CPT | Mod: MH

## 2024-10-10 PROCEDURE — 74177 CT ABD & PELVIS W/CONTRAST: CPT | Mod: 26,MH

## 2024-11-15 NOTE — PROGRESS NOTE ADULT - ASSESSMENT
83F with DM2, GERD, dementia who presents with SOB, fever, cough and generalized weakness found with PNA on CT. ECG is normal.  Doubt ACS    Suggest:    1. PNA  - Treat with Abx    2. Multiple CAD risk factors  - Recommend Lipitor 20mg daily    3. Pt very agitated during last night.  Very calm in am.      4. Anemia, 6.8 --> 8.3 today.  No plan for endoscopy at this time.    Will follow prn.      15-Nov-2024 21:29

## 2024-11-20 ENCOUNTER — APPOINTMENT (OUTPATIENT)
Dept: GASTROENTEROLOGY | Facility: CLINIC | Age: 84
End: 2024-11-20
Payer: MEDICAID

## 2024-11-20 DIAGNOSIS — K83.8 OTHER SPECIFIED DISEASES OF BILIARY TRACT: ICD-10-CM

## 2024-11-20 PROCEDURE — 99442: CPT

## 2024-11-22 PROBLEM — K83.8 DILATED BILE DUCT: Status: ACTIVE | Noted: 2024-11-22

## 2024-11-22 PROBLEM — K83.8 DILATED INTRAHEPATIC BILE DUCT: Status: ACTIVE | Noted: 2024-11-22

## 2025-04-21 NOTE — CONSULT NOTE ADULT - SUBJECTIVE AND OBJECTIVE BOX
HT=414 bpm, NNXB=273/103 mmhg, JgH2=168.0 %, Resp=11 B/min, EtCO2=35 mmHg, Apnea=8 Seconds, Manuela=10 Date/Time Patient Seen:  		  Referring MD:   Data Reviewed	       Patient is a 83y old  Female who presents with a chief complaint of fever (09 Jan 2024 07:42)      Subjective/HPI   · Chief Complaint: The patient is a 83y Female complaining of fever.  · HPI Objective Statement: 83-year-old female PMH of DM2, anxiety/depression, chronic joint and back pain, osteoarthritis, GERD, anemia, exsmoker dementia, presents to the emergency department with son sent by urgent care by ambulance with report of pneumonia.  Patient states for the past week patient has had dry cough, generalized weakness, no appetite, at urgent care patient flu/COVID was negative, concern for patient having 101 fever, low blood pressure, and right sided lung infiltrate.    PAST MEDICAL & SURGICAL HISTORY:  DM (diabetes mellitus)    Type 2 diabetes mellitus    Anxiety    OA (osteoarthritis) of knee  (Right knee)    Anemia    Other chronic pancreatitis    DM (diabetes mellitus)    HLD (hyperlipidemia)    Anxiety and depression    Seasonal allergies    Diabetes mellitus, type 2    GERD (gastroesophageal reflux disease)    Chronic joint pain    S/P arthroscopic knee surgery  (Right knee, 2007)    History of back surgery  (2007)    S/P cholecystectomy  (2002)    S/P subtotal gastrectomy  (with gastrojejunal anastomosis, 30 years ago)    S/P knee replacement    PAST SURGICAL HISTORY:  History of back surgery (2007)    S/P arthroscopic knee surgery (Right knee, 2007)    S/P cholecystectomy (2002)    S/P knee replacement     S/P subtotal gastrectomy (with gastrojejunal anastomosis, 30 years ago).     Tobacco Usage:  · Tobacco Usage	Former smoker    ALLERGIES AND HOME MEDICATIONS:   Allergies:        Allergies:  	No Known Allergies:     Home Medications:   * Patient Currently Takes Medications as of 03-May-2021 23:55 documented in Structured Notes  · 	traMADol 50 mg oral tablet: 1 tab(s) orally every 6 hours, As Needed MDD:6  · 	amLODIPine 5 mg oral tablet: 1 tab(s) orally once a day  · 	acetaminophen 325 mg oral tablet: 2 tab(s) orally every 6 hours, As needed, Mild Pain (1 - 3)  · 	ibuprofen 600 mg oral tablet: 1 tab(s) orally every 6 hours, As Needed  · 	Tylenol 500 mg oral tablet: 2 tab(s) orally every 6 hours, As Needed  · 	metFORMIN 750 mg oral tablet, extended release: orally 2 times a day  · 	aspirin 81 mg oral tablet, chewable: 1 tab(s) orally once a day  · 	KlonoPIN 0.5 mg oral tablet: orally once a day  · 	omeprazole 20 mg oral delayed release tablet: 1 tab(s) orally once a day  · 	cyproheptadine 2 mg/5 mL oral syrup: orally 2 times a day  · 	sertraline 50 mg oral tablet: orally once a day (at bedtime)  · 	Dulcolax Laxative 5 mg oral delayed release tablet: orally once a day (at bedtime)  · 	loratadine 10 mg oral tablet: 1 tab(s) orally once a day  · 	metFORMIN 750 mg oral tablet, extended release: 2 tab(s) orally once a day  · 	ferrous gluconate 324 mg (38 mg elemental iron) oral tablet: orally once a day  · 	atorvastatin 20 mg oral tablet: 1 tab(s) orally once a day  · 	Lexapro 5 mg oral tablet: 1 tab(s) orally once a day    VITAL SIGNS ([place]):    ,,ED ADULT Flow Sheet:    08-Jan-2024 21:07  · Temp (F): 98.3  · Temp (C) Temp (C): 36.8  · Temp site Temp Site: oral  · Heart Rate Heart Rate (beats/min): 98        Medication list         MEDICATIONS  (STANDING):  azithromycin  IVPB 500 milliGRAM(s) IV Intermittent every 24 hours  cefTRIAXone   IVPB 1000 milliGRAM(s) IV Intermittent every 24 hours  dextrose 5%. 1000 milliLiter(s) (100 mL/Hr) IV Continuous <Continuous>  dextrose 5%. 1000 milliLiter(s) (50 mL/Hr) IV Continuous <Continuous>  dextrose 50% Injectable 12.5 Gram(s) IV Push once  dextrose 50% Injectable 25 Gram(s) IV Push once  dextrose 50% Injectable 25 Gram(s) IV Push once  glucagon  Injectable 1 milliGRAM(s) IntraMuscular once  insulin glargine Injectable (LANTUS) 12 Unit(s) SubCutaneous every morning  insulin lispro (ADMELOG) corrective regimen sliding scale   SubCutaneous three times a day before meals  insulin lispro Injectable (ADMELOG) 4 Unit(s) SubCutaneous three times a day before meals  mirtazapine Soltab 15 milliGRAM(s) Oral at bedtime  QUEtiapine 25 milliGRAM(s) Oral at bedtime  sodium chloride 0.9%. 1000 milliLiter(s) (75 mL/Hr) IV Continuous <Continuous>    MEDICATIONS  (PRN):  dextrose Oral Gel 15 Gram(s) Oral once PRN Blood Glucose LESS THAN 70 milliGRAM(s)/deciliter         Vitals log        ICU Vital Signs Last 24 Hrs  T(C): 36.9 (09 Jan 2024 06:41), Max: 37.1 (08 Jan 2024 23:57)  T(F): 98.4 (09 Jan 2024 06:41), Max: 98.7 (08 Jan 2024 23:57)  HR: 83 (09 Jan 2024 06:41) (71 - 98)  BP: 133/84 (09 Jan 2024 06:41) (105/58 - 133/84)  BP(mean): --  ABP: --  ABP(mean): --  RR: 16 (09 Jan 2024 06:41) (16 - 18)  SpO2: 98% (09 Jan 2024 06:41) (94% - 100%)    O2 Parameters below as of 09 Jan 2024 06:41  Patient On (Oxygen Delivery Method): room air                 Input and Output:  I&O's Detail      Lab Data                        6.8    16.10 )-----------( 246      ( 08 Jan 2024 22:05 )             19.9     01-08    132<L>  |  100  |  44<H>  ----------------------------<  624<HH>  4.9   |  25  |  1.60<H>    Ca    8.5      08 Jan 2024 22:05    TPro  6.9  /  Alb  2.3<L>  /  TBili  0.3  /  DBili  x   /  AST  15  /  ALT  16  /  AlkPhos  112  01-08            Review of Systems	    weakness  cough    Objective     Physical Examination    heart s1s2  lung dec BS  head nc      Pertinent Lab findings & Imaging      Womack:  NO   Adequate UO     I&O's Detail           Discussed with:     Cultures:	        Radiology      ACC: [id number] EXAM:  CT ABDOMEN AND PELVIS IC   ORDERED BY: BERTHA GAITAN     ACC: [id number] EXAM:  CT CHEST IC   ORDERED BY: BERTHA GAITAN     PROCEDURE DATE:  01/08/2024          INTERPRETATION:  CLINICAL INFORMATION: Not eating.    COMPARISON: None.    CONTRAST/COMPLICATIONS:  IV Contrast: Omnipaque 350 (accession [id number]), IV contrast documented   in unlinked concurrent exam (accession [id number])  90 cc administered   10   cc discarded  Oral Contrast: NONE  Complications: None reported at time of study completion    PROCEDURE:  CT of the Chest, Abdomen and Pelvis was performed.  Sagittal and coronal reformats were performed.    FINDINGS:  CHEST:  LUNGS AND LARGE AIRWAYS: Extensive consolidation of the lateral segment   of the right middle lobe with associated cavitary lesions; most   concerning for necrotizing pneumonia. No sizable pulmonary nodules.  PLEURA: No pleural effusion.  VESSELS: Calcified atherosclerosis of the aorta and coronary arteries.  HEART: Heart size is normal. No pericardial effusion.  MEDIASTINUM AND SERINA: No lymphadenopathy.  CHEST WALL AND LOWER NECK: Within normal limits.    ABDOMEN AND PELVIS:  LIVER: Within normal limits.  BILE DUCTS: Intrahepatic biliary duct dilatation noted. CBD dilatation   measuring up to 1.1 cm.  GALLBLADDER: Cholecystectomy.  SPLEEN: Within normal limits.  PANCREAS: Pancreatic duct dilatation measuring up to 0.6 cm.  ADRENALS: Within normal limits.  KIDNEYS/URETERS: Within normal limits.    BLADDER: Within normal limits.  REPRODUCTIVE ORGANS: Hysterectomy.    BOWEL: No bowel obstruction. Appendix is not visualized. No evidence of   inflammation in the pericecal region.  PERITONEUM: No ascites.  VESSELS: Calcified atherosclerosis of the aorta and some of its branches.  RETROPERITONEUM/LYMPH NODES: No lymphadenopathy.  ABDOMINAL WALL: Sizable fat-containing ventral hernia.  BONES: Stable.    IMPRESSION:    1.  Necrotizing right middle lobe pneumonia.  2.  Dilated common biliary and pancreatic ducts. There is no   discrete/obvious distal obstructive lesion.    Correlation with MRCP may be useful. See above text for additional   findings, and more detailed explanations.        --- End of Report ---            SILVINO REYNOSO MD; Attending Radiologist  This document has been electronically signed. Jan 8 2024 11:40PM                         Date/Time Patient Seen:  		  Referring MD:   Data Reviewed	       Patient is a 83y old  Female who presents with a chief complaint of fever (09 Jan 2024 07:42)      Subjective/HPI   · Chief Complaint: The patient is a 83y Female complaining of fever.  · HPI Objective Statement: 83-year-old female PMH of DM2, anxiety/depression, chronic joint and back pain, osteoarthritis, GERD, anemia, exsmoker dementia, presents to the emergency department with son sent by urgent care by ambulance with report of pneumonia.  Patient states for the past week patient has had dry cough, generalized weakness, no appetite, at urgent care patient flu/COVID was negative, concern for patient having 101 fever, low blood pressure, and right sided lung infiltrate.    PAST MEDICAL & SURGICAL HISTORY:  DM (diabetes mellitus)    Type 2 diabetes mellitus    Anxiety    OA (osteoarthritis) of knee  (Right knee)    Anemia    Other chronic pancreatitis    DM (diabetes mellitus)    HLD (hyperlipidemia)    Anxiety and depression    Seasonal allergies    Diabetes mellitus, type 2    GERD (gastroesophageal reflux disease)    Chronic joint pain    S/P arthroscopic knee surgery  (Right knee, 2007)    History of back surgery  (2007)    S/P cholecystectomy  (2002)    S/P subtotal gastrectomy  (with gastrojejunal anastomosis, 30 years ago)    S/P knee replacement    PAST SURGICAL HISTORY:  History of back surgery (2007)    S/P arthroscopic knee surgery (Right knee, 2007)    S/P cholecystectomy (2002)    S/P knee replacement     S/P subtotal gastrectomy (with gastrojejunal anastomosis, 30 years ago).     Tobacco Usage:  · Tobacco Usage	Former smoker    ALLERGIES AND HOME MEDICATIONS:   Allergies:        Allergies:  	No Known Allergies:     Home Medications:   * Patient Currently Takes Medications as of 03-May-2021 23:55 documented in Structured Notes  · 	traMADol 50 mg oral tablet: 1 tab(s) orally every 6 hours, As Needed MDD:6  · 	amLODIPine 5 mg oral tablet: 1 tab(s) orally once a day  · 	acetaminophen 325 mg oral tablet: 2 tab(s) orally every 6 hours, As needed, Mild Pain (1 - 3)  · 	ibuprofen 600 mg oral tablet: 1 tab(s) orally every 6 hours, As Needed  · 	Tylenol 500 mg oral tablet: 2 tab(s) orally every 6 hours, As Needed  · 	metFORMIN 750 mg oral tablet, extended release: orally 2 times a day  · 	aspirin 81 mg oral tablet, chewable: 1 tab(s) orally once a day  · 	KlonoPIN 0.5 mg oral tablet: orally once a day  · 	omeprazole 20 mg oral delayed release tablet: 1 tab(s) orally once a day  · 	cyproheptadine 2 mg/5 mL oral syrup: orally 2 times a day  · 	sertraline 50 mg oral tablet: orally once a day (at bedtime)  · 	Dulcolax Laxative 5 mg oral delayed release tablet: orally once a day (at bedtime)  · 	loratadine 10 mg oral tablet: 1 tab(s) orally once a day  · 	metFORMIN 750 mg oral tablet, extended release: 2 tab(s) orally once a day  · 	ferrous gluconate 324 mg (38 mg elemental iron) oral tablet: orally once a day  · 	atorvastatin 20 mg oral tablet: 1 tab(s) orally once a day  · 	Lexapro 5 mg oral tablet: 1 tab(s) orally once a day    VITAL SIGNS ([place]):    ,,ED ADULT Flow Sheet:    08-Jan-2024 21:07  · Temp (F): 98.3  · Temp (C) Temp (C): 36.8  · Temp site Temp Site: oral  · Heart Rate Heart Rate (beats/min): 98        Medication list         MEDICATIONS  (STANDING):  azithromycin  IVPB 500 milliGRAM(s) IV Intermittent every 24 hours  cefTRIAXone   IVPB 1000 milliGRAM(s) IV Intermittent every 24 hours  dextrose 5%. 1000 milliLiter(s) (100 mL/Hr) IV Continuous <Continuous>  dextrose 5%. 1000 milliLiter(s) (50 mL/Hr) IV Continuous <Continuous>  dextrose 50% Injectable 12.5 Gram(s) IV Push once  dextrose 50% Injectable 25 Gram(s) IV Push once  dextrose 50% Injectable 25 Gram(s) IV Push once  glucagon  Injectable 1 milliGRAM(s) IntraMuscular once  insulin glargine Injectable (LANTUS) 12 Unit(s) SubCutaneous every morning  insulin lispro (ADMELOG) corrective regimen sliding scale   SubCutaneous three times a day before meals  insulin lispro Injectable (ADMELOG) 4 Unit(s) SubCutaneous three times a day before meals  mirtazapine Soltab 15 milliGRAM(s) Oral at bedtime  QUEtiapine 25 milliGRAM(s) Oral at bedtime  sodium chloride 0.9%. 1000 milliLiter(s) (75 mL/Hr) IV Continuous <Continuous>    MEDICATIONS  (PRN):  dextrose Oral Gel 15 Gram(s) Oral once PRN Blood Glucose LESS THAN 70 milliGRAM(s)/deciliter         Vitals log        ICU Vital Signs Last 24 Hrs  T(C): 36.9 (09 Jan 2024 06:41), Max: 37.1 (08 Jan 2024 23:57)  T(F): 98.4 (09 Jan 2024 06:41), Max: 98.7 (08 Jan 2024 23:57)  HR: 83 (09 Jan 2024 06:41) (71 - 98)  BP: 133/84 (09 Jan 2024 06:41) (105/58 - 133/84)  BP(mean): --  ABP: --  ABP(mean): --  RR: 16 (09 Jan 2024 06:41) (16 - 18)  SpO2: 98% (09 Jan 2024 06:41) (94% - 100%)    O2 Parameters below as of 09 Jan 2024 06:41  Patient On (Oxygen Delivery Method): room air                 Input and Output:  I&O's Detail      Lab Data                        6.8    16.10 )-----------( 246      ( 08 Jan 2024 22:05 )             19.9     01-08    132<L>  |  100  |  44<H>  ----------------------------<  624<HH>  4.9   |  25  |  1.60<H>    Ca    8.5      08 Jan 2024 22:05    TPro  6.9  /  Alb  2.3<L>  /  TBili  0.3  /  DBili  x   /  AST  15  /  ALT  16  /  AlkPhos  112  01-08            Review of Systems	    weakness  cough    Objective     Physical Examination    heart s1s2  lung dec BS  head nc      Pertinent Lab findings & Imaging      Womack:  NO   Adequate UO     I&O's Detail           Discussed with:     Cultures:	        Radiology      ACC: [id number] EXAM:  CT ABDOMEN AND PELVIS IC   ORDERED BY: BERTHA GAITAN     ACC: [id number] EXAM:  CT CHEST IC   ORDERED BY: BERTHA GAITAN     PROCEDURE DATE:  01/08/2024          INTERPRETATION:  CLINICAL INFORMATION: Not eating.    COMPARISON: None.    CONTRAST/COMPLICATIONS:  IV Contrast: Omnipaque 350 (accession [id number]), IV contrast documented   in unlinked concurrent exam (accession [id number])  90 cc administered   10   cc discarded  Oral Contrast: NONE  Complications: None reported at time of study completion    PROCEDURE:  CT of the Chest, Abdomen and Pelvis was performed.  Sagittal and coronal reformats were performed.    FINDINGS:  CHEST:  LUNGS AND LARGE AIRWAYS: Extensive consolidation of the lateral segment   of the right middle lobe with associated cavitary lesions; most   concerning for necrotizing pneumonia. No sizable pulmonary nodules.  PLEURA: No pleural effusion.  VESSELS: Calcified atherosclerosis of the aorta and coronary arteries.  HEART: Heart size is normal. No pericardial effusion.  MEDIASTINUM AND SERINA: No lymphadenopathy.  CHEST WALL AND LOWER NECK: Within normal limits.    ABDOMEN AND PELVIS:  LIVER: Within normal limits.  BILE DUCTS: Intrahepatic biliary duct dilatation noted. CBD dilatation   measuring up to 1.1 cm.  GALLBLADDER: Cholecystectomy.  SPLEEN: Within normal limits.  PANCREAS: Pancreatic duct dilatation measuring up to 0.6 cm.  ADRENALS: Within normal limits.  KIDNEYS/URETERS: Within normal limits.    BLADDER: Within normal limits.  REPRODUCTIVE ORGANS: Hysterectomy.    BOWEL: No bowel obstruction. Appendix is not visualized. No evidence of   inflammation in the pericecal region.  PERITONEUM: No ascites.  VESSELS: Calcified atherosclerosis of the aorta and some of its branches.  RETROPERITONEUM/LYMPH NODES: No lymphadenopathy.  ABDOMINAL WALL: Sizable fat-containing ventral hernia.  BONES: Stable.    IMPRESSION:    1.  Necrotizing right middle lobe pneumonia.  2.  Dilated common biliary and pancreatic ducts. There is no   discrete/obvious distal obstructive lesion.    Correlation with MRCP may be useful. See above text for additional   findings, and more detailed explanations.        --- End of Report ---            SILVINO REYNOSO MD; Attending Radiologist  This document has been electronically signed. Jan 8 2024 11:40PM
